# Patient Record
Sex: FEMALE | Race: WHITE | NOT HISPANIC OR LATINO | URBAN - NONMETROPOLITAN AREA
[De-identification: names, ages, dates, MRNs, and addresses within clinical notes are randomized per-mention and may not be internally consistent; named-entity substitution may affect disease eponyms.]

---

## 2020-01-01 ENCOUNTER — APPOINTMENT (OUTPATIENT)
Dept: GENERAL RADIOLOGY | Facility: HOSPITAL | Age: 55
End: 2020-01-01

## 2020-01-01 ENCOUNTER — APPOINTMENT (OUTPATIENT)
Dept: CARDIOLOGY | Facility: HOSPITAL | Age: 55
End: 2020-01-01

## 2020-01-01 ENCOUNTER — HOSPITAL ENCOUNTER (INPATIENT)
Facility: HOSPITAL | Age: 55
LOS: 9 days | End: 2020-12-11
Attending: EMERGENCY MEDICINE | Admitting: INTERNAL MEDICINE

## 2020-01-01 ENCOUNTER — APPOINTMENT (OUTPATIENT)
Dept: NUCLEAR MEDICINE | Facility: HOSPITAL | Age: 55
End: 2020-01-01

## 2020-01-01 VITALS
DIASTOLIC BLOOD PRESSURE: 38 MMHG | BODY MASS INDEX: 36.96 KG/M2 | HEIGHT: 67 IN | WEIGHT: 235.5 LBS | SYSTOLIC BLOOD PRESSURE: 62 MMHG | TEMPERATURE: 99.7 F | RESPIRATION RATE: 28 BRPM

## 2020-01-01 DIAGNOSIS — U07.1 PNEUMONIA DUE TO COVID-19 VIRUS: ICD-10-CM

## 2020-01-01 DIAGNOSIS — J12.82 PNEUMONIA DUE TO COVID-19 VIRUS: ICD-10-CM

## 2020-01-01 DIAGNOSIS — U07.1 ACUTE RESPIRATORY DISTRESS SYNDROME (ARDS) DUE TO COVID-19 VIRUS (HCC): ICD-10-CM

## 2020-01-01 DIAGNOSIS — J80 ACUTE RESPIRATORY DISTRESS SYNDROME (ARDS) DUE TO COVID-19 VIRUS (HCC): ICD-10-CM

## 2020-01-01 DIAGNOSIS — U07.1 COVID-19: Primary | ICD-10-CM

## 2020-01-01 DIAGNOSIS — J96.01 ACUTE RESPIRATORY FAILURE WITH HYPOXIA (HCC): ICD-10-CM

## 2020-01-01 LAB
ABO GROUP BLD: NORMAL
ALBUMIN SERPL-MCNC: 3.1 G/DL (ref 3.5–5.2)
ALBUMIN SERPL-MCNC: 3.1 G/DL (ref 3.5–5.2)
ALBUMIN SERPL-MCNC: 3.2 G/DL (ref 3.5–5.2)
ALBUMIN SERPL-MCNC: 3.4 G/DL (ref 3.5–5.2)
ALBUMIN SERPL-MCNC: 3.4 G/DL (ref 3.5–5.2)
ALBUMIN SERPL-MCNC: 3.6 G/DL (ref 3.5–5.2)
ALBUMIN SERPL-MCNC: 3.7 G/DL (ref 3.5–5.2)
ALBUMIN SERPL-MCNC: 3.8 G/DL (ref 3.5–5.2)
ALBUMIN SERPL-MCNC: 3.9 G/DL (ref 3.5–5.2)
ALBUMIN SERPL-MCNC: 4 G/DL (ref 3.5–5.2)
ALBUMIN SERPL-MCNC: 4 G/DL (ref 3.5–5.2)
ALBUMIN/GLOB SERPL: 1 G/DL
ALBUMIN/GLOB SERPL: 1 G/DL
ALBUMIN/GLOB SERPL: 1.1 G/DL
ALBUMIN/GLOB SERPL: 1.2 G/DL
ALBUMIN/GLOB SERPL: 1.2 G/DL
ALBUMIN/GLOB SERPL: 1.3 G/DL
ALBUMIN/GLOB SERPL: 1.4 G/DL
ALP SERPL-CCNC: 100 U/L (ref 39–117)
ALP SERPL-CCNC: 109 U/L (ref 39–117)
ALP SERPL-CCNC: 110 U/L (ref 39–117)
ALP SERPL-CCNC: 110 U/L (ref 39–117)
ALP SERPL-CCNC: 112 U/L (ref 39–117)
ALP SERPL-CCNC: 113 U/L (ref 39–117)
ALP SERPL-CCNC: 118 U/L (ref 39–117)
ALP SERPL-CCNC: 88 U/L (ref 39–117)
ALP SERPL-CCNC: 94 U/L (ref 39–117)
ALP SERPL-CCNC: 95 U/L (ref 39–117)
ALP SERPL-CCNC: 96 U/L (ref 39–117)
ALT SERPL W P-5'-P-CCNC: 12 U/L (ref 1–33)
ALT SERPL W P-5'-P-CCNC: 14 U/L (ref 1–33)
ALT SERPL W P-5'-P-CCNC: 18 U/L (ref 1–33)
ALT SERPL W P-5'-P-CCNC: 18 U/L (ref 1–33)
AMPHET+METHAMPHET UR QL: NEGATIVE
AMPHETAMINES UR QL: NEGATIVE
ANION GAP SERPL CALCULATED.3IONS-SCNC: 10 MMOL/L (ref 5–15)
ANION GAP SERPL CALCULATED.3IONS-SCNC: 11 MMOL/L (ref 5–15)
ANION GAP SERPL CALCULATED.3IONS-SCNC: 12 MMOL/L (ref 5–15)
ANION GAP SERPL CALCULATED.3IONS-SCNC: 14 MMOL/L (ref 5–15)
ANION GAP SERPL CALCULATED.3IONS-SCNC: 9 MMOL/L (ref 5–15)
ARTERIAL PATENCY WRIST A: ABNORMAL
ARTERIAL PATENCY WRIST A: POSITIVE
AST SERPL-CCNC: 19 U/L (ref 1–32)
AST SERPL-CCNC: 19 U/L (ref 1–32)
AST SERPL-CCNC: 21 U/L (ref 1–32)
AST SERPL-CCNC: 21 U/L (ref 1–32)
AST SERPL-CCNC: 22 U/L (ref 1–32)
AST SERPL-CCNC: 24 U/L (ref 1–32)
AST SERPL-CCNC: 29 U/L (ref 1–32)
AST SERPL-CCNC: 31 U/L (ref 1–32)
AST SERPL-CCNC: 35 U/L (ref 1–32)
AST SERPL-CCNC: 36 U/L (ref 1–32)
AST SERPL-CCNC: 37 U/L (ref 1–32)
ATMOSPHERIC PRESS: 749 MMHG
ATMOSPHERIC PRESS: 750 MMHG
ATMOSPHERIC PRESS: 750 MMHG
ATMOSPHERIC PRESS: 751 MMHG
ATMOSPHERIC PRESS: 754 MMHG
ATMOSPHERIC PRESS: 757 MMHG
ATMOSPHERIC PRESS: 759 MMHG
B PARAPERT DNA SPEC QL NAA+PROBE: NOT DETECTED
B PERT DNA SPEC QL NAA+PROBE: NOT DETECTED
BACTERIA SPEC AEROBE CULT: ABNORMAL
BACTERIA SPEC AEROBE CULT: NORMAL
BACTERIA SPEC AEROBE CULT: NORMAL
BACTERIA UR QL AUTO: ABNORMAL /HPF
BARBITURATES UR QL SCN: NEGATIVE
BASE EXCESS BLDA CALC-SCNC: -7.5 MMOL/L (ref 0–2)
BASE EXCESS BLDA CALC-SCNC: 0 MMOL/L (ref 0–2)
BASE EXCESS BLDA CALC-SCNC: 1.4 MMOL/L (ref 0–2)
BASE EXCESS BLDA CALC-SCNC: 2.7 MMOL/L (ref 0–2)
BASE EXCESS BLDA CALC-SCNC: 2.9 MMOL/L (ref 0–2)
BASE EXCESS BLDA CALC-SCNC: 3.2 MMOL/L (ref 0–2)
BASE EXCESS BLDA CALC-SCNC: 3.3 MMOL/L (ref 0–2)
BASE EXCESS BLDA CALC-SCNC: 3.5 MMOL/L (ref 0–2)
BASE EXCESS BLDA CALC-SCNC: 6 MMOL/L (ref 0–2)
BASOPHILS # BLD AUTO: 0 10*3/MM3 (ref 0–0.2)
BASOPHILS # BLD AUTO: 0.01 10*3/MM3 (ref 0–0.2)
BASOPHILS # BLD AUTO: 0.02 10*3/MM3 (ref 0–0.2)
BASOPHILS # BLD AUTO: 0.03 10*3/MM3 (ref 0–0.2)
BASOPHILS NFR BLD AUTO: 0 % (ref 0–1.5)
BASOPHILS NFR BLD AUTO: 0.2 % (ref 0–1.5)
BASOPHILS NFR BLD AUTO: 0.2 % (ref 0–1.5)
BASOPHILS NFR BLD AUTO: 0.4 % (ref 0–1.5)
BDY SITE: ABNORMAL
BENZODIAZ UR QL SCN: NEGATIVE
BH BB BLOOD EXPIRATION DATE: NORMAL
BH BB BLOOD TYPE BARCODE: 600
BH BB DISPENSE STATUS: NORMAL
BH BB PRODUCT CODE: NORMAL
BH BB UNIT NUMBER: NORMAL
BH CV ECHO MEAS - AO MAX PG (FULL): 2.2 MMHG
BH CV ECHO MEAS - AO MAX PG: 8.4 MMHG
BH CV ECHO MEAS - AO MEAN PG (FULL): 2 MMHG
BH CV ECHO MEAS - AO MEAN PG: 5 MMHG
BH CV ECHO MEAS - AO ROOT AREA (BSA CORRECTED): 1.5
BH CV ECHO MEAS - AO ROOT AREA: 8.6 CM^2
BH CV ECHO MEAS - AO ROOT DIAM: 3.3 CM
BH CV ECHO MEAS - AO V2 MAX: 145 CM/SEC
BH CV ECHO MEAS - AO V2 MEAN: 97.9 CM/SEC
BH CV ECHO MEAS - AO V2 VTI: 21.9 CM
BH CV ECHO MEAS - AVA(I,A): 3.6 CM^2
BH CV ECHO MEAS - AVA(I,D): 3.6 CM^2
BH CV ECHO MEAS - AVA(V,A): 2.7 CM^2
BH CV ECHO MEAS - AVA(V,D): 2.7 CM^2
BH CV ECHO MEAS - BSA(HAYCOCK): 2.3 M^2
BH CV ECHO MEAS - BSA: 2.2 M^2
BH CV ECHO MEAS - BZI_BMI: 37.4 KILOGRAMS/M^2
BH CV ECHO MEAS - BZI_METRIC_HEIGHT: 170.2 CM
BH CV ECHO MEAS - BZI_METRIC_WEIGHT: 108.4 KG
BH CV ECHO MEAS - EDV(CUBED): 102.5 ML
BH CV ECHO MEAS - EDV(MOD-SP4): 88.7 ML
BH CV ECHO MEAS - EDV(TEICH): 101.3 ML
BH CV ECHO MEAS - EF(CUBED): 76 %
BH CV ECHO MEAS - EF(MOD-SP4): 57 %
BH CV ECHO MEAS - EF(TEICH): 67.9 %
BH CV ECHO MEAS - ESV(CUBED): 24.6 ML
BH CV ECHO MEAS - ESV(MOD-SP4): 38.1 ML
BH CV ECHO MEAS - ESV(TEICH): 32.5 ML
BH CV ECHO MEAS - FS: 37.8 %
BH CV ECHO MEAS - IVS/LVPW: 0.93
BH CV ECHO MEAS - IVSD: 0.94 CM
BH CV ECHO MEAS - LA DIMENSION: 4.9 CM
BH CV ECHO MEAS - LA/AO: 1.5
BH CV ECHO MEAS - LAT PEAK E' VEL: 9.4 CM/SEC
BH CV ECHO MEAS - LV DIASTOLIC VOL/BSA (35-75): 40.7 ML/M^2
BH CV ECHO MEAS - LV MASS(C)D: 157.5 GRAMS
BH CV ECHO MEAS - LV MASS(C)DI: 72.2 GRAMS/M^2
BH CV ECHO MEAS - LV MAX PG: 6.3 MMHG
BH CV ECHO MEAS - LV MEAN PG: 3 MMHG
BH CV ECHO MEAS - LV SYSTOLIC VOL/BSA (12-30): 17.5 ML/M^2
BH CV ECHO MEAS - LV V1 MAX: 125 CM/SEC
BH CV ECHO MEAS - LV V1 MEAN: 77.3 CM/SEC
BH CV ECHO MEAS - LV V1 VTI: 25.4 CM
BH CV ECHO MEAS - LVIDD: 4.7 CM
BH CV ECHO MEAS - LVIDS: 2.9 CM
BH CV ECHO MEAS - LVLD AP4: 7.6 CM
BH CV ECHO MEAS - LVLS AP4: 6.8 CM
BH CV ECHO MEAS - LVOT AREA (M): 3.1 CM^2
BH CV ECHO MEAS - LVOT AREA: 3.1 CM^2
BH CV ECHO MEAS - LVOT DIAM: 2 CM
BH CV ECHO MEAS - LVPWD: 1 CM
BH CV ECHO MEAS - MED PEAK E' VEL: 5.2 CM/SEC
BH CV ECHO MEAS - MV A MAX VEL: 70.3 CM/SEC
BH CV ECHO MEAS - MV DEC SLOPE: 904.5 CM/SEC^2
BH CV ECHO MEAS - MV DEC TIME: 0.12 SEC
BH CV ECHO MEAS - MV E MAX VEL: 71.3 CM/SEC
BH CV ECHO MEAS - MV E/A: 1
BH CV ECHO MEAS - MV P1/2T MAX VEL: 126 CM/SEC
BH CV ECHO MEAS - MV P1/2T: 40.8 MSEC
BH CV ECHO MEAS - MVA P1/2T LCG: 1.7 CM^2
BH CV ECHO MEAS - MVA(P1/2T): 5.4 CM^2
BH CV ECHO MEAS - PA MAX PG: 5.9 MMHG
BH CV ECHO MEAS - PA V2 MAX: 121 CM/SEC
BH CV ECHO MEAS - SI(AO): 85.9 ML/M^2
BH CV ECHO MEAS - SI(CUBED): 35.7 ML/M^2
BH CV ECHO MEAS - SI(LVOT): 36.6 ML/M^2
BH CV ECHO MEAS - SI(MOD-SP4): 23.2 ML/M^2
BH CV ECHO MEAS - SI(TEICH): 31.6 ML/M^2
BH CV ECHO MEAS - SV(AO): 187.3 ML
BH CV ECHO MEAS - SV(CUBED): 77.9 ML
BH CV ECHO MEAS - SV(LVOT): 79.8 ML
BH CV ECHO MEAS - SV(MOD-SP4): 50.6 ML
BH CV ECHO MEAS - SV(TEICH): 68.9 ML
BH CV ECHO MEAS - TR MAX VEL: 125 CM/SEC
BH CV ECHO MEASUREMENTS AVERAGE E/E' RATIO: 9.77
BILIRUB CONJ SERPL-MCNC: 0.2 MG/DL (ref 0–0.3)
BILIRUB CONJ SERPL-MCNC: 0.2 MG/DL (ref 0–0.3)
BILIRUB INDIRECT SERPL-MCNC: 0.3 MG/DL
BILIRUB SERPL-MCNC: 0.4 MG/DL (ref 0–1.2)
BILIRUB SERPL-MCNC: 0.4 MG/DL (ref 0–1.2)
BILIRUB SERPL-MCNC: 0.5 MG/DL (ref 0–1.2)
BILIRUB SERPL-MCNC: 0.6 MG/DL (ref 0–1.2)
BILIRUB SERPL-MCNC: 0.6 MG/DL (ref 0–1.2)
BILIRUB UR QL STRIP: NEGATIVE
BLD GP AB SCN SERPL QL: NEGATIVE
BODY TEMPERATURE: 37 C
BUN SERPL-MCNC: 27 MG/DL (ref 6–20)
BUN SERPL-MCNC: 28 MG/DL (ref 6–20)
BUN SERPL-MCNC: 31 MG/DL (ref 6–20)
BUN SERPL-MCNC: 34 MG/DL (ref 6–20)
BUN SERPL-MCNC: 35 MG/DL (ref 6–20)
BUN SERPL-MCNC: 40 MG/DL (ref 6–20)
BUN SERPL-MCNC: 42 MG/DL (ref 6–20)
BUN SERPL-MCNC: 42 MG/DL (ref 6–20)
BUN SERPL-MCNC: 47 MG/DL (ref 6–20)
BUN SERPL-MCNC: 51 MG/DL (ref 6–20)
BUN/CREAT SERPL: 33.6 (ref 7–25)
BUN/CREAT SERPL: 40.9 (ref 7–25)
BUN/CREAT SERPL: 51.9 (ref 7–25)
BUN/CREAT SERPL: 52.8 (ref 7–25)
BUN/CREAT SERPL: 54.5 (ref 7–25)
BUN/CREAT SERPL: 59.6 (ref 7–25)
BUN/CREAT SERPL: 60.6 (ref 7–25)
BUN/CREAT SERPL: 62 (ref 7–25)
BUN/CREAT SERPL: 68.6 (ref 7–25)
BUN/CREAT SERPL: 72.4 (ref 7–25)
BUPRENORPHINE SERPL-MCNC: NEGATIVE NG/ML
C PNEUM DNA NPH QL NAA+NON-PROBE: NOT DETECTED
CALCIUM SPEC-SCNC: 9.1 MG/DL (ref 8.6–10.5)
CALCIUM SPEC-SCNC: 9.1 MG/DL (ref 8.6–10.5)
CALCIUM SPEC-SCNC: 9.2 MG/DL (ref 8.6–10.5)
CALCIUM SPEC-SCNC: 9.2 MG/DL (ref 8.6–10.5)
CALCIUM SPEC-SCNC: 9.3 MG/DL (ref 8.6–10.5)
CALCIUM SPEC-SCNC: 9.4 MG/DL (ref 8.6–10.5)
CALCIUM SPEC-SCNC: 9.7 MG/DL (ref 8.6–10.5)
CANNABINOIDS SERPL QL: NEGATIVE
CHLORIDE SERPL-SCNC: 100 MMOL/L (ref 98–107)
CHLORIDE SERPL-SCNC: 101 MMOL/L (ref 98–107)
CHLORIDE SERPL-SCNC: 101 MMOL/L (ref 98–107)
CHLORIDE SERPL-SCNC: 102 MMOL/L (ref 98–107)
CHLORIDE SERPL-SCNC: 103 MMOL/L (ref 98–107)
CHLORIDE SERPL-SCNC: 104 MMOL/L (ref 98–107)
CHLORIDE SERPL-SCNC: 104 MMOL/L (ref 98–107)
CHLORIDE SERPL-SCNC: 105 MMOL/L (ref 98–107)
CHLORIDE SERPL-SCNC: 107 MMOL/L (ref 98–107)
CHLORIDE SERPL-SCNC: 97 MMOL/L (ref 98–107)
CK SERPL-CCNC: 74 U/L (ref 20–180)
CLARITY UR: ABNORMAL
CO2 SERPL-SCNC: 25 MMOL/L (ref 22–29)
CO2 SERPL-SCNC: 26 MMOL/L (ref 22–29)
CO2 SERPL-SCNC: 27 MMOL/L (ref 22–29)
CO2 SERPL-SCNC: 28 MMOL/L (ref 22–29)
CO2 SERPL-SCNC: 28 MMOL/L (ref 22–29)
CO2 SERPL-SCNC: 29 MMOL/L (ref 22–29)
COCAINE UR QL: NEGATIVE
COLOR UR: YELLOW
CREAT SERPL-MCNC: 0.5 MG/DL (ref 0.57–1)
CREAT SERPL-MCNC: 0.51 MG/DL (ref 0.57–1)
CREAT SERPL-MCNC: 0.52 MG/DL (ref 0.57–1)
CREAT SERPL-MCNC: 0.53 MG/DL (ref 0.57–1)
CREAT SERPL-MCNC: 0.57 MG/DL (ref 0.57–1)
CREAT SERPL-MCNC: 0.58 MG/DL (ref 0.57–1)
CREAT SERPL-MCNC: 0.66 MG/DL (ref 0.57–1)
CREAT SERPL-MCNC: 0.77 MG/DL (ref 0.57–1)
CREAT SERPL-MCNC: 1.15 MG/DL (ref 0.57–1)
CREAT SERPL-MCNC: 1.17 MG/DL (ref 0.57–1)
CREAT SERPL-MCNC: 1.52 MG/DL (ref 0.57–1)
CRP SERPL-MCNC: 11.27 MG/DL (ref 0–0.5)
CRP SERPL-MCNC: 18.57 MG/DL (ref 0–0.5)
CRP SERPL-MCNC: 8.96 MG/DL (ref 0–0.5)
D DIMER PPP FEU-MCNC: 0.52 MG/L (FEU) (ref 0–0.5)
D DIMER PPP FEU-MCNC: 0.69 MG/L (FEU) (ref 0–0.5)
D DIMER PPP FEU-MCNC: 19.3 MG/L (FEU) (ref 0–0.5)
D-LACTATE SERPL-SCNC: 1.9 MMOL/L (ref 0.5–2)
DEPRECATED RDW RBC AUTO: 40.1 FL (ref 37–54)
DEPRECATED RDW RBC AUTO: 40.3 FL (ref 37–54)
DEPRECATED RDW RBC AUTO: 41.2 FL (ref 37–54)
DEPRECATED RDW RBC AUTO: 41.3 FL (ref 37–54)
DEPRECATED RDW RBC AUTO: 42.3 FL (ref 37–54)
EOSINOPHIL # BLD AUTO: 0 10*3/MM3 (ref 0–0.4)
EOSINOPHIL # BLD AUTO: 0.05 10*3/MM3 (ref 0–0.4)
EOSINOPHIL NFR BLD AUTO: 0 % (ref 0.3–6.2)
EOSINOPHIL NFR BLD AUTO: 0.5 % (ref 0.3–6.2)
EPAP: 10
EPAP: 8
ERYTHROCYTE [DISTWIDTH] IN BLOOD BY AUTOMATED COUNT: 13.2 % (ref 12.3–15.4)
ERYTHROCYTE [DISTWIDTH] IN BLOOD BY AUTOMATED COUNT: 13.4 % (ref 12.3–15.4)
ERYTHROCYTE [DISTWIDTH] IN BLOOD BY AUTOMATED COUNT: 13.6 % (ref 12.3–15.4)
FERRITIN SERPL-MCNC: 1034 NG/ML (ref 13–150)
FERRITIN SERPL-MCNC: 689.6 NG/ML (ref 13–150)
FERRITIN SERPL-MCNC: 738.9 NG/ML (ref 13–150)
FIBRINOGEN PPP-MCNC: 595 MG/DL (ref 240–460)
FLUAV H1 2009 PAND RNA NPH QL NAA+PROBE: NOT DETECTED
FLUAV H1 HA GENE NPH QL NAA+PROBE: NOT DETECTED
FLUAV H3 RNA NPH QL NAA+PROBE: NOT DETECTED
FLUAV SUBTYP SPEC NAA+PROBE: NOT DETECTED
FLUBV RNA ISLT QL NAA+PROBE: NOT DETECTED
GAS FLOW AIRWAY: 40 LPM
GAS FLOW AIRWAY: 5 LPM
GFR SERPL CREATININE-BSD FRML MDRD: 108 ML/MIN/1.73
GFR SERPL CREATININE-BSD FRML MDRD: 110 ML/MIN/1.73
GFR SERPL CREATININE-BSD FRML MDRD: 119 ML/MIN/1.73
GFR SERPL CREATININE-BSD FRML MDRD: 122 ML/MIN/1.73
GFR SERPL CREATININE-BSD FRML MDRD: 125 ML/MIN/1.73
GFR SERPL CREATININE-BSD FRML MDRD: 128 ML/MIN/1.73
GFR SERPL CREATININE-BSD FRML MDRD: 35 ML/MIN/1.73
GFR SERPL CREATININE-BSD FRML MDRD: 48 ML/MIN/1.73
GFR SERPL CREATININE-BSD FRML MDRD: 49 ML/MIN/1.73
GFR SERPL CREATININE-BSD FRML MDRD: 78 ML/MIN/1.73
GFR SERPL CREATININE-BSD FRML MDRD: 93 ML/MIN/1.73
GLOBULIN UR ELPH-MCNC: 2.7 GM/DL
GLOBULIN UR ELPH-MCNC: 2.9 GM/DL
GLOBULIN UR ELPH-MCNC: 3 GM/DL
GLOBULIN UR ELPH-MCNC: 3.1 GM/DL
GLOBULIN UR ELPH-MCNC: 3.1 GM/DL
GLUCOSE SERPL-MCNC: 104 MG/DL (ref 65–99)
GLUCOSE SERPL-MCNC: 108 MG/DL (ref 65–99)
GLUCOSE SERPL-MCNC: 115 MG/DL (ref 65–99)
GLUCOSE SERPL-MCNC: 116 MG/DL (ref 65–99)
GLUCOSE SERPL-MCNC: 118 MG/DL (ref 65–99)
GLUCOSE SERPL-MCNC: 118 MG/DL (ref 65–99)
GLUCOSE SERPL-MCNC: 121 MG/DL (ref 65–99)
GLUCOSE SERPL-MCNC: 132 MG/DL (ref 65–99)
GLUCOSE SERPL-MCNC: 144 MG/DL (ref 65–99)
GLUCOSE SERPL-MCNC: 152 MG/DL (ref 65–99)
GLUCOSE UR STRIP-MCNC: NEGATIVE MG/DL
HADV DNA SPEC NAA+PROBE: NOT DETECTED
HBA1C MFR BLD: 5.6 % (ref 4.8–5.6)
HCO3 BLDA-SCNC: 21.9 MMOL/L (ref 20–26)
HCO3 BLDA-SCNC: 25.6 MMOL/L (ref 20–26)
HCO3 BLDA-SCNC: 26.4 MMOL/L (ref 20–26)
HCO3 BLDA-SCNC: 27 MMOL/L (ref 20–26)
HCO3 BLDA-SCNC: 27.2 MMOL/L (ref 20–26)
HCO3 BLDA-SCNC: 27.3 MMOL/L (ref 20–26)
HCO3 BLDA-SCNC: 28.3 MMOL/L (ref 20–26)
HCO3 BLDA-SCNC: 28.3 MMOL/L (ref 20–26)
HCO3 BLDA-SCNC: 30.2 MMOL/L (ref 20–26)
HCOV 229E RNA SPEC QL NAA+PROBE: NOT DETECTED
HCOV HKU1 RNA SPEC QL NAA+PROBE: NOT DETECTED
HCOV NL63 RNA SPEC QL NAA+PROBE: NOT DETECTED
HCOV OC43 RNA SPEC QL NAA+PROBE: NOT DETECTED
HCT VFR BLD AUTO: 36.9 % (ref 34–46.6)
HCT VFR BLD AUTO: 37.1 % (ref 34–46.6)
HCT VFR BLD AUTO: 37.8 % (ref 34–46.6)
HCT VFR BLD AUTO: 40.9 % (ref 34–46.6)
HCT VFR BLD AUTO: 41.4 % (ref 34–46.6)
HGB BLD-MCNC: 11.7 G/DL (ref 12–15.9)
HGB BLD-MCNC: 11.8 G/DL (ref 12–15.9)
HGB BLD-MCNC: 12.1 G/DL (ref 12–15.9)
HGB BLD-MCNC: 13.1 G/DL (ref 12–15.9)
HGB BLD-MCNC: 13.5 G/DL (ref 12–15.9)
HGB UR QL STRIP.AUTO: NEGATIVE
HMPV RNA NPH QL NAA+NON-PROBE: NOT DETECTED
HPIV1 RNA SPEC QL NAA+PROBE: NOT DETECTED
HPIV2 RNA SPEC QL NAA+PROBE: NOT DETECTED
HPIV3 RNA NPH QL NAA+PROBE: NOT DETECTED
HPIV4 P GENE NPH QL NAA+PROBE: NOT DETECTED
HYALINE CASTS UR QL AUTO: ABNORMAL /LPF
IL6 SERPL-MCNC: 8.3 PG/ML (ref 0–13)
IMM GRANULOCYTES # BLD AUTO: 0.04 10*3/MM3 (ref 0–0.05)
IMM GRANULOCYTES # BLD AUTO: 0.07 10*3/MM3 (ref 0–0.05)
IMM GRANULOCYTES # BLD AUTO: 0.09 10*3/MM3 (ref 0–0.05)
IMM GRANULOCYTES # BLD AUTO: 0.13 10*3/MM3 (ref 0–0.05)
IMM GRANULOCYTES NFR BLD AUTO: 1.1 % (ref 0–0.5)
IMM GRANULOCYTES NFR BLD AUTO: 1.1 % (ref 0–0.5)
IMM GRANULOCYTES NFR BLD AUTO: 1.2 % (ref 0–0.5)
IMM GRANULOCYTES NFR BLD AUTO: 1.3 % (ref 0–0.5)
INHALED O2 CONCENTRATION: 100 %
INHALED O2 CONCENTRATION: 95 %
INR PPP: 1.06 (ref 0.91–1.09)
IPAP: 16
IPAP: 20
KETONES UR QL STRIP: ABNORMAL
L PNEUMO1 AG UR QL IA: NEGATIVE
LDH SERPL-CCNC: 628 U/L (ref 135–214)
LDH SERPL-CCNC: 656 U/L (ref 135–214)
LDH SERPL-CCNC: 731 U/L (ref 135–214)
LEFT ATRIUM VOLUME INDEX: 30 ML/M2
LEUKOCYTE ESTERASE UR QL STRIP.AUTO: ABNORMAL
LYMPHOCYTES # BLD AUTO: 0.39 10*3/MM3 (ref 0.7–3.1)
LYMPHOCYTES # BLD AUTO: 0.46 10*3/MM3 (ref 0.7–3.1)
LYMPHOCYTES # BLD AUTO: 0.53 10*3/MM3 (ref 0.7–3.1)
LYMPHOCYTES # BLD AUTO: 0.77 10*3/MM3 (ref 0.7–3.1)
LYMPHOCYTES # BLD MANUAL: 0.97 10*3/MM3 (ref 0.7–3.1)
LYMPHOCYTES NFR BLD AUTO: 10.6 % (ref 19.6–45.3)
LYMPHOCYTES NFR BLD AUTO: 13.2 % (ref 19.6–45.3)
LYMPHOCYTES NFR BLD AUTO: 5.1 % (ref 19.6–45.3)
LYMPHOCYTES NFR BLD AUTO: 6.1 % (ref 19.6–45.3)
LYMPHOCYTES NFR BLD MANUAL: 3.1 % (ref 19.6–45.3)
LYMPHOCYTES NFR BLD MANUAL: 4.1 % (ref 5–12)
Lab: ABNORMAL
M PNEUMO IGG SER IA-ACNC: NOT DETECTED
MAGNESIUM SERPL-MCNC: 1.9 MG/DL (ref 1.6–2.6)
MAGNESIUM SERPL-MCNC: 2.1 MG/DL (ref 1.6–2.6)
MAGNESIUM SERPL-MCNC: 2.4 MG/DL (ref 1.6–2.6)
MAXIMAL PREDICTED HEART RATE: 164 BPM
MCH RBC QN AUTO: 26.4 PG (ref 26.6–33)
MCH RBC QN AUTO: 26.6 PG (ref 26.6–33)
MCH RBC QN AUTO: 26.7 PG (ref 26.6–33)
MCH RBC QN AUTO: 27.1 PG (ref 26.6–33)
MCH RBC QN AUTO: 27.7 PG (ref 26.6–33)
MCHC RBC AUTO-ENTMCNC: 31.5 G/DL (ref 31.5–35.7)
MCHC RBC AUTO-ENTMCNC: 32 G/DL (ref 31.5–35.7)
MCHC RBC AUTO-ENTMCNC: 32.6 G/DL (ref 31.5–35.7)
MCV RBC AUTO: 83 FL (ref 79–97)
MCV RBC AUTO: 83.1 FL (ref 79–97)
MCV RBC AUTO: 83.3 FL (ref 79–97)
MCV RBC AUTO: 83.7 FL (ref 79–97)
MCV RBC AUTO: 86.6 FL (ref 79–97)
METHADONE UR QL SCN: NEGATIVE
MODALITY: ABNORMAL
MONOCYTES # BLD AUTO: 0.19 10*3/MM3 (ref 0.1–0.9)
MONOCYTES # BLD AUTO: 0.25 10*3/MM3 (ref 0.1–0.9)
MONOCYTES # BLD AUTO: 0.56 10*3/MM3 (ref 0.1–0.9)
MONOCYTES # BLD AUTO: 0.66 10*3/MM3 (ref 0.1–0.9)
MONOCYTES # BLD AUTO: 1.29 10*3/MM3 (ref 0.1–0.9)
MONOCYTES NFR BLD AUTO: 3 % (ref 5–12)
MONOCYTES NFR BLD AUTO: 5.4 % (ref 5–12)
MONOCYTES NFR BLD AUTO: 7.2 % (ref 5–12)
MONOCYTES NFR BLD AUTO: 9.1 % (ref 5–12)
MRSA DNA SPEC QL NAA+PROBE: ABNORMAL
NEUTROPHILS # BLD AUTO: 29.17 10*3/MM3 (ref 1.7–7)
NEUTROPHILS NFR BLD AUTO: 2.73 10*3/MM3 (ref 1.7–7)
NEUTROPHILS NFR BLD AUTO: 5.7 10*3/MM3 (ref 1.7–7)
NEUTROPHILS NFR BLD AUTO: 5.74 10*3/MM3 (ref 1.7–7)
NEUTROPHILS NFR BLD AUTO: 78.5 % (ref 42.7–76)
NEUTROPHILS NFR BLD AUTO: 78.7 % (ref 42.7–76)
NEUTROPHILS NFR BLD AUTO: 87.5 % (ref 42.7–76)
NEUTROPHILS NFR BLD AUTO: 89.6 % (ref 42.7–76)
NEUTROPHILS NFR BLD AUTO: 9.09 10*3/MM3 (ref 1.7–7)
NEUTROPHILS NFR BLD MANUAL: 92.9 % (ref 42.7–76)
NITRITE UR QL STRIP: NEGATIVE
NOTIFIED BY: ABNORMAL
NOTIFIED WHO: ABNORMAL
NRBC BLD AUTO-RTO: 0 /100 WBC (ref 0–0.2)
NT-PROBNP SERPL-MCNC: 288.6 PG/ML (ref 0–900)
NT-PROBNP SERPL-MCNC: 688.2 PG/ML (ref 0–900)
OPIATES UR QL: POSITIVE
OXYCODONE UR QL SCN: POSITIVE
PCO2 BLDA: 38.3 MM HG (ref 35–45)
PCO2 BLDA: 38.5 MM HG (ref 35–45)
PCO2 BLDA: 38.7 MM HG (ref 35–45)
PCO2 BLDA: 40.9 MM HG (ref 35–45)
PCO2 BLDA: 41.1 MM HG (ref 35–45)
PCO2 BLDA: 42.9 MM HG (ref 35–45)
PCO2 BLDA: 43.8 MM HG (ref 35–45)
PCO2 BLDA: 48.9 MM HG (ref 35–45)
PCO2 BLDA: 62.3 MM HG (ref 35–45)
PCO2 TEMP ADJ BLD: 38.3 MM HG (ref 35–45)
PCO2 TEMP ADJ BLD: 38.5 MM HG (ref 35–45)
PCO2 TEMP ADJ BLD: 38.7 MM HG (ref 35–45)
PCO2 TEMP ADJ BLD: 40.9 MM HG (ref 35–45)
PCO2 TEMP ADJ BLD: 41.1 MM HG (ref 35–45)
PCO2 TEMP ADJ BLD: 42.9 MM HG (ref 35–45)
PCO2 TEMP ADJ BLD: 43.8 MM HG (ref 35–45)
PCO2 TEMP ADJ BLD: 48.9 MM HG (ref 35–45)
PCO2 TEMP ADJ BLD: 62.3 MM HG (ref 35–45)
PCP UR QL SCN: NEGATIVE
PEEP RESPIRATORY: 10 CM[H2O]
PEEP RESPIRATORY: 8 CM[H2O]
PH BLDA: 7.15 PH UNITS (ref 7.35–7.45)
PH BLDA: 7.34 PH UNITS (ref 7.35–7.45)
PH BLDA: 7.42 PH UNITS (ref 7.35–7.45)
PH BLDA: 7.43 PH UNITS (ref 7.35–7.45)
PH BLDA: 7.45 PH UNITS (ref 7.35–7.45)
PH BLDA: 7.46 PH UNITS (ref 7.35–7.45)
PH BLDA: 7.48 PH UNITS (ref 7.35–7.45)
PH UR STRIP.AUTO: 5.5 [PH] (ref 5–8)
PH, TEMP CORRECTED: 7.15 PH UNITS (ref 7.35–7.45)
PH, TEMP CORRECTED: 7.34 PH UNITS (ref 7.35–7.45)
PH, TEMP CORRECTED: 7.42 PH UNITS (ref 7.35–7.45)
PH, TEMP CORRECTED: 7.43 PH UNITS (ref 7.35–7.45)
PH, TEMP CORRECTED: 7.45 PH UNITS (ref 7.35–7.45)
PH, TEMP CORRECTED: 7.46 PH UNITS (ref 7.35–7.45)
PH, TEMP CORRECTED: 7.48 PH UNITS (ref 7.35–7.45)
PHOSPHATE SERPL-MCNC: 3.1 MG/DL (ref 2.5–4.5)
PHOSPHATE SERPL-MCNC: 3.2 MG/DL (ref 2.5–4.5)
PLAT MORPH BLD: NORMAL
PLATELET # BLD AUTO: 212 10*3/MM3 (ref 140–450)
PLATELET # BLD AUTO: 258 10*3/MM3 (ref 140–450)
PLATELET # BLD AUTO: 294 10*3/MM3 (ref 140–450)
PLATELET # BLD AUTO: 306 10*3/MM3 (ref 140–450)
PLATELET # BLD AUTO: 419 10*3/MM3 (ref 140–450)
PMV BLD AUTO: 10.2 FL (ref 6–12)
PMV BLD AUTO: 10.4 FL (ref 6–12)
PMV BLD AUTO: 10.5 FL (ref 6–12)
PMV BLD AUTO: 10.8 FL (ref 6–12)
PMV BLD AUTO: 11.2 FL (ref 6–12)
PO2 BLDA: 44 MM HG (ref 83–108)
PO2 BLDA: 47 MM HG (ref 83–108)
PO2 BLDA: 52.4 MM HG (ref 83–108)
PO2 BLDA: 53.7 MM HG (ref 83–108)
PO2 BLDA: 55.4 MM HG (ref 83–108)
PO2 BLDA: 60.3 MM HG (ref 83–108)
PO2 BLDA: 60.5 MM HG (ref 83–108)
PO2 BLDA: 65.5 MM HG (ref 83–108)
PO2 BLDA: 89.4 MM HG (ref 83–108)
PO2 TEMP ADJ BLD: 44 MM HG (ref 83–108)
PO2 TEMP ADJ BLD: 47 MM HG (ref 83–108)
PO2 TEMP ADJ BLD: 52.4 MM HG (ref 83–108)
PO2 TEMP ADJ BLD: 53.7 MM HG (ref 83–108)
PO2 TEMP ADJ BLD: 55.4 MM HG (ref 83–108)
PO2 TEMP ADJ BLD: 60.3 MM HG (ref 83–108)
PO2 TEMP ADJ BLD: 60.5 MM HG (ref 83–108)
PO2 TEMP ADJ BLD: 65.5 MM HG (ref 83–108)
PO2 TEMP ADJ BLD: 89.4 MM HG (ref 83–108)
POTASSIUM SERPL-SCNC: 3.8 MMOL/L (ref 3.5–5.2)
POTASSIUM SERPL-SCNC: 3.9 MMOL/L (ref 3.5–5.2)
POTASSIUM SERPL-SCNC: 4 MMOL/L (ref 3.5–5.2)
POTASSIUM SERPL-SCNC: 4.1 MMOL/L (ref 3.5–5.2)
POTASSIUM SERPL-SCNC: 4.2 MMOL/L (ref 3.5–5.2)
POTASSIUM SERPL-SCNC: 4.2 MMOL/L (ref 3.5–5.2)
POTASSIUM SERPL-SCNC: 4.4 MMOL/L (ref 3.5–5.2)
POTASSIUM SERPL-SCNC: 4.6 MMOL/L (ref 3.5–5.2)
POTASSIUM SERPL-SCNC: 4.6 MMOL/L (ref 3.5–5.2)
POTASSIUM SERPL-SCNC: 5.4 MMOL/L (ref 3.5–5.2)
PROCALCITONIN SERPL-MCNC: 0.09 NG/ML (ref 0–0.25)
PROCALCITONIN SERPL-MCNC: 0.17 NG/ML (ref 0–0.25)
PROPOXYPH UR QL: NEGATIVE
PROT SERPL-MCNC: 5.9 G/DL (ref 6–8.5)
PROT SERPL-MCNC: 6.1 G/DL (ref 6–8.5)
PROT SERPL-MCNC: 6.1 G/DL (ref 6–8.5)
PROT SERPL-MCNC: 6.3 G/DL (ref 6–8.5)
PROT SERPL-MCNC: 6.3 G/DL (ref 6–8.5)
PROT SERPL-MCNC: 6.4 G/DL (ref 6–8.5)
PROT SERPL-MCNC: 6.5 G/DL (ref 6–8.5)
PROT SERPL-MCNC: 6.8 G/DL (ref 6–8.5)
PROT SERPL-MCNC: 6.9 G/DL (ref 6–8.5)
PROT SERPL-MCNC: 7.1 G/DL (ref 6–8.5)
PROT SERPL-MCNC: 7.3 G/DL (ref 6–8.5)
PROT UR QL STRIP: ABNORMAL
PROTHROMBIN TIME: 13.4 SECONDS (ref 11.9–14.6)
QT INTERVAL: 378 MS
QTC INTERVAL: 490 MS
RBC # BLD AUTO: 4.26 10*6/MM3 (ref 3.77–5.28)
RBC # BLD AUTO: 4.43 10*6/MM3 (ref 3.77–5.28)
RBC # BLD AUTO: 4.55 10*6/MM3 (ref 3.77–5.28)
RBC # BLD AUTO: 4.91 10*6/MM3 (ref 3.77–5.28)
RBC # BLD AUTO: 4.99 10*6/MM3 (ref 3.77–5.28)
RBC # UR: ABNORMAL /HPF
RBC MORPH BLD: NORMAL
REF LAB TEST METHOD: ABNORMAL
RH BLD: NEGATIVE
RHINOVIRUS RNA SPEC NAA+PROBE: NOT DETECTED
RSV RNA NPH QL NAA+NON-PROBE: NOT DETECTED
S PNEUM AG SPEC QL LA: NEGATIVE
SAO2 % BLDCOA: 82.7 % (ref 94–99)
SAO2 % BLDCOA: 84.6 % (ref 94–99)
SAO2 % BLDCOA: 87.4 % (ref 94–99)
SAO2 % BLDCOA: 89.1 % (ref 94–99)
SAO2 % BLDCOA: 89.8 % (ref 94–99)
SAO2 % BLDCOA: 92.2 % (ref 94–99)
SAO2 % BLDCOA: 92.9 % (ref 94–99)
SAO2 % BLDCOA: 92.9 % (ref 94–99)
SAO2 % BLDCOA: 94.9 % (ref 94–99)
SARS-COV-2 RDRP RESP QL NAA+PROBE: DETECTED
SET MECH RESP RATE: 14
SET MECH RESP RATE: 14
SET MECH RESP RATE: 20
SET MECH RESP RATE: 20
SODIUM SERPL-SCNC: 136 MMOL/L (ref 136–145)
SODIUM SERPL-SCNC: 139 MMOL/L (ref 136–145)
SODIUM SERPL-SCNC: 140 MMOL/L (ref 136–145)
SODIUM SERPL-SCNC: 142 MMOL/L (ref 136–145)
SP GR UR STRIP: 1.02 (ref 1–1.03)
SQUAMOUS #/AREA URNS HPF: ABNORMAL /HPF
STRESS TARGET HR: 139 BPM
T&S EXPIRATION DATE: NORMAL
TRICYCLICS UR QL SCN: NEGATIVE
UNIT  ABO: NORMAL
UNIT  RH: NORMAL
UROBILINOGEN UR QL STRIP: ABNORMAL
VENTILATOR MODE: ABNORMAL
VENTILATOR MODE: AC
VENTILATOR MODE: AC
VT ON VENT VENT: 500 ML
VT ON VENT VENT: 550 ML
WBC # BLD AUTO: 10.38 10*3/MM3 (ref 3.4–10.8)
WBC # BLD AUTO: 3.48 10*3/MM3 (ref 3.4–10.8)
WBC # BLD AUTO: 31.4 10*3/MM3 (ref 3.4–10.8)
WBC # BLD AUTO: 6.36 10*3/MM3 (ref 3.4–10.8)
WBC # BLD AUTO: 7.29 10*3/MM3 (ref 3.4–10.8)
WBC MORPH BLD: NORMAL
WBC UR QL AUTO: ABNORMAL /HPF

## 2020-01-01 PROCEDURE — 63710000001 DEXAMETHASONE PER 0.25 MG: Performed by: INTERNAL MEDICINE

## 2020-01-01 PROCEDURE — 82803 BLOOD GASES ANY COMBINATION: CPT

## 2020-01-01 PROCEDURE — 87899 AGENT NOS ASSAY W/OPTIC: CPT | Performed by: INTERNAL MEDICINE

## 2020-01-01 PROCEDURE — 94799 UNLISTED PULMONARY SVC/PX: CPT

## 2020-01-01 PROCEDURE — 25010000002 FUROSEMIDE PER 20 MG: Performed by: INTERNAL MEDICINE

## 2020-01-01 PROCEDURE — 25010000002 CEFTRIAXONE: Performed by: INTERNAL MEDICINE

## 2020-01-01 PROCEDURE — 99254 IP/OBS CNSLTJ NEW/EST MOD 60: CPT | Performed by: THORACIC SURGERY (CARDIOTHORACIC VASCULAR SURGERY)

## 2020-01-01 PROCEDURE — 25010000002 DEXAMETHASONE SODIUM PHOSPHATE 10 MG/ML SOLUTION: Performed by: EMERGENCY MEDICINE

## 2020-01-01 PROCEDURE — 80053 COMPREHEN METABOLIC PANEL: CPT | Performed by: EMERGENCY MEDICINE

## 2020-01-01 PROCEDURE — 93306 TTE W/DOPPLER COMPLETE: CPT | Performed by: INTERNAL MEDICINE

## 2020-01-01 PROCEDURE — 71045 X-RAY EXAM CHEST 1 VIEW: CPT

## 2020-01-01 PROCEDURE — 87186 SC STD MICRODIL/AGAR DIL: CPT | Performed by: INTERNAL MEDICINE

## 2020-01-01 PROCEDURE — 99232 SBSQ HOSP IP/OBS MODERATE 35: CPT | Performed by: INTERNAL MEDICINE

## 2020-01-01 PROCEDURE — 25010000002 EPINEPHRINE 1 MG/10ML SOLUTION PREFILLED SYRINGE

## 2020-01-01 PROCEDURE — 84100 ASSAY OF PHOSPHORUS: CPT | Performed by: INTERNAL MEDICINE

## 2020-01-01 PROCEDURE — 85025 COMPLETE CBC W/AUTO DIFF WBC: CPT | Performed by: EMERGENCY MEDICINE

## 2020-01-01 PROCEDURE — A9500 TC99M SESTAMIBI: HCPCS | Performed by: INTERNAL MEDICINE

## 2020-01-01 PROCEDURE — 83615 LACTATE (LD) (LDH) ENZYME: CPT | Performed by: EMERGENCY MEDICINE

## 2020-01-01 PROCEDURE — 25010000002 KETOROLAC TROMETHAMINE PER 15 MG: Performed by: INTERNAL MEDICINE

## 2020-01-01 PROCEDURE — 04HY32Z INSERTION OF MONITORING DEVICE INTO LOWER ARTERY, PERCUTANEOUS APPROACH: ICD-10-PCS | Performed by: INTERNAL MEDICINE

## 2020-01-01 PROCEDURE — 80053 COMPREHEN METABOLIC PANEL: CPT | Performed by: INTERNAL MEDICINE

## 2020-01-01 PROCEDURE — 83615 LACTATE (LD) (LDH) ENZYME: CPT | Performed by: INTERNAL MEDICINE

## 2020-01-01 PROCEDURE — 5A1935Z RESPIRATORY VENTILATION, LESS THAN 24 CONSECUTIVE HOURS: ICD-10-PCS | Performed by: INTERNAL MEDICINE

## 2020-01-01 PROCEDURE — A9540 TC99M MAA: HCPCS | Performed by: INTERNAL MEDICINE

## 2020-01-01 PROCEDURE — 36600 WITHDRAWAL OF ARTERIAL BLOOD: CPT

## 2020-01-01 PROCEDURE — 84145 PROCALCITONIN (PCT): CPT | Performed by: INTERNAL MEDICINE

## 2020-01-01 PROCEDURE — 93005 ELECTROCARDIOGRAM TRACING: CPT | Performed by: EMERGENCY MEDICINE

## 2020-01-01 PROCEDURE — 25010000002 MIDAZOLAM HCL (PF) 5 MG/5ML SOLUTION: Performed by: INTERNAL MEDICINE

## 2020-01-01 PROCEDURE — 0100U HC BIOFIRE FILMARRAY RESP PANEL 2: CPT | Performed by: INTERNAL MEDICINE

## 2020-01-01 PROCEDURE — 83880 ASSAY OF NATRIURETIC PEPTIDE: CPT | Performed by: INTERNAL MEDICINE

## 2020-01-01 PROCEDURE — 25010000002 PROPOFOL 10 MG/ML EMULSION: Performed by: INTERNAL MEDICINE

## 2020-01-01 PROCEDURE — 83735 ASSAY OF MAGNESIUM: CPT | Performed by: INTERNAL MEDICINE

## 2020-01-01 PROCEDURE — 82728 ASSAY OF FERRITIN: CPT | Performed by: INTERNAL MEDICINE

## 2020-01-01 PROCEDURE — 0BH17EZ INSERTION OF ENDOTRACHEAL AIRWAY INTO TRACHEA, VIA NATURAL OR ARTIFICIAL OPENING: ICD-10-PCS | Performed by: INTERNAL MEDICINE

## 2020-01-01 PROCEDURE — 99285 EMERGENCY DEPT VISIT HI MDM: CPT

## 2020-01-01 PROCEDURE — 84145 PROCALCITONIN (PCT): CPT | Performed by: EMERGENCY MEDICINE

## 2020-01-01 PROCEDURE — 25010000002 LORAZEPAM PER 2 MG: Performed by: INTERNAL MEDICINE

## 2020-01-01 PROCEDURE — 25010000002 ENOXAPARIN PER 10 MG: Performed by: INTERNAL MEDICINE

## 2020-01-01 PROCEDURE — 85025 COMPLETE CBC W/AUTO DIFF WBC: CPT | Performed by: INTERNAL MEDICINE

## 2020-01-01 PROCEDURE — 25010000002 CEFTRIAXONE PER 250 MG: Performed by: INTERNAL MEDICINE

## 2020-01-01 PROCEDURE — 85379 FIBRIN DEGRADATION QUANT: CPT | Performed by: INTERNAL MEDICINE

## 2020-01-01 PROCEDURE — 25010000002 DEXAMETHASONE SODIUM PHOSPHATE 10 MG/ML SOLUTION: Performed by: INTERNAL MEDICINE

## 2020-01-01 PROCEDURE — 85007 BL SMEAR W/DIFF WBC COUNT: CPT | Performed by: FAMILY MEDICINE

## 2020-01-01 PROCEDURE — 0 TECHNETIUM SESTAMIBI: Performed by: INTERNAL MEDICINE

## 2020-01-01 PROCEDURE — 87635 SARS-COV-2 COVID-19 AMP PRB: CPT | Performed by: EMERGENCY MEDICINE

## 2020-01-01 PROCEDURE — 87086 URINE CULTURE/COLONY COUNT: CPT | Performed by: INTERNAL MEDICINE

## 2020-01-01 PROCEDURE — 86900 BLOOD TYPING SEROLOGIC ABO: CPT | Performed by: EMERGENCY MEDICINE

## 2020-01-01 PROCEDURE — 82550 ASSAY OF CK (CPK): CPT | Performed by: INTERNAL MEDICINE

## 2020-01-01 PROCEDURE — 93306 TTE W/DOPPLER COMPLETE: CPT

## 2020-01-01 PROCEDURE — 25010000002 SUCCINYLCHOLINE PER 20 MG: Performed by: INTERNAL MEDICINE

## 2020-01-01 PROCEDURE — 25010000002 AZITHROMYCIN PER 500 MG: Performed by: INTERNAL MEDICINE

## 2020-01-01 PROCEDURE — 86140 C-REACTIVE PROTEIN: CPT | Performed by: EMERGENCY MEDICINE

## 2020-01-01 PROCEDURE — 25010000002 FENTANYL CITRATE (PF) 100 MCG/2ML SOLUTION 50 ML VIAL: Performed by: INTERNAL MEDICINE

## 2020-01-01 PROCEDURE — 80306 DRUG TEST PRSMV INSTRMNT: CPT | Performed by: INTERNAL MEDICINE

## 2020-01-01 PROCEDURE — 93010 ELECTROCARDIOGRAM REPORT: CPT | Performed by: INTERNAL MEDICINE

## 2020-01-01 PROCEDURE — 83605 ASSAY OF LACTIC ACID: CPT | Performed by: EMERGENCY MEDICINE

## 2020-01-01 PROCEDURE — 99254 IP/OBS CNSLTJ NEW/EST MOD 60: CPT | Performed by: INTERNAL MEDICINE

## 2020-01-01 PROCEDURE — 85379 FIBRIN DEGRADATION QUANT: CPT | Performed by: EMERGENCY MEDICINE

## 2020-01-01 PROCEDURE — 85384 FIBRINOGEN ACTIVITY: CPT | Performed by: INTERNAL MEDICINE

## 2020-01-01 PROCEDURE — XW033E5 INTRODUCTION OF REMDESIVIR ANTI-INFECTIVE INTO PERIPHERAL VEIN, PERCUTANEOUS APPROACH, NEW TECHNOLOGY GROUP 5: ICD-10-PCS | Performed by: INTERNAL MEDICINE

## 2020-01-01 PROCEDURE — 87040 BLOOD CULTURE FOR BACTERIA: CPT | Performed by: EMERGENCY MEDICINE

## 2020-01-01 PROCEDURE — 86850 RBC ANTIBODY SCREEN: CPT | Performed by: EMERGENCY MEDICINE

## 2020-01-01 PROCEDURE — 78580 LUNG PERFUSION IMAGING: CPT

## 2020-01-01 PROCEDURE — XW13325 TRANSFUSION OF CONVALESCENT PLASMA (NONAUTOLOGOUS) INTO PERIPHERAL VEIN, PERCUTANEOUS APPROACH, NEW TECHNOLOGY GROUP 5: ICD-10-PCS | Performed by: INTERNAL MEDICINE

## 2020-01-01 PROCEDURE — 05H633Z INSERTION OF INFUSION DEVICE INTO LEFT SUBCLAVIAN VEIN, PERCUTANEOUS APPROACH: ICD-10-PCS | Performed by: FAMILY MEDICINE

## 2020-01-01 PROCEDURE — 87641 MR-STAPH DNA AMP PROBE: CPT | Performed by: INTERNAL MEDICINE

## 2020-01-01 PROCEDURE — 82728 ASSAY OF FERRITIN: CPT | Performed by: EMERGENCY MEDICINE

## 2020-01-01 PROCEDURE — 94660 CPAP INITIATION&MGMT: CPT

## 2020-01-01 PROCEDURE — 82565 ASSAY OF CREATININE: CPT | Performed by: INTERNAL MEDICINE

## 2020-01-01 PROCEDURE — 94002 VENT MGMT INPAT INIT DAY: CPT

## 2020-01-01 PROCEDURE — 85610 PROTHROMBIN TIME: CPT | Performed by: INTERNAL MEDICINE

## 2020-01-01 PROCEDURE — 86140 C-REACTIVE PROTEIN: CPT | Performed by: INTERNAL MEDICINE

## 2020-01-01 PROCEDURE — 25010000002 ONDANSETRON PER 1 MG: Performed by: INTERNAL MEDICINE

## 2020-01-01 PROCEDURE — 31500 INSERT EMERGENCY AIRWAY: CPT | Performed by: INTERNAL MEDICINE

## 2020-01-01 PROCEDURE — 25010000002 MIDAZOLAM 50 MG/10ML SOLUTION 10 ML VIAL: Performed by: INTERNAL MEDICINE

## 2020-01-01 PROCEDURE — 06HY33Z INSERTION OF INFUSION DEVICE INTO LOWER VEIN, PERCUTANEOUS APPROACH: ICD-10-PCS | Performed by: INTERNAL MEDICINE

## 2020-01-01 PROCEDURE — 86901 BLOOD TYPING SEROLOGIC RH(D): CPT | Performed by: EMERGENCY MEDICINE

## 2020-01-01 PROCEDURE — 81001 URINALYSIS AUTO W/SCOPE: CPT | Performed by: INTERNAL MEDICINE

## 2020-01-01 PROCEDURE — 83036 HEMOGLOBIN GLYCOSYLATED A1C: CPT | Performed by: INTERNAL MEDICINE

## 2020-01-01 PROCEDURE — 80076 HEPATIC FUNCTION PANEL: CPT | Performed by: INTERNAL MEDICINE

## 2020-01-01 PROCEDURE — 25010000002 PHENYLEPHRINE 10 MG/ML SOLUTION 5 ML VIAL: Performed by: FAMILY MEDICINE

## 2020-01-01 PROCEDURE — 86927 PLASMA FRESH FROZEN: CPT

## 2020-01-01 PROCEDURE — 83520 IMMUNOASSAY QUANT NOS NONAB: CPT | Performed by: INTERNAL MEDICINE

## 2020-01-01 PROCEDURE — 25010000002 PROPOFOL 1000 MG/100ML EMULSION

## 2020-01-01 PROCEDURE — 85025 COMPLETE CBC W/AUTO DIFF WBC: CPT | Performed by: FAMILY MEDICINE

## 2020-01-01 PROCEDURE — 82248 BILIRUBIN DIRECT: CPT | Performed by: INTERNAL MEDICINE

## 2020-01-01 PROCEDURE — 32551 INSERTION OF CHEST TUBE: CPT | Performed by: THORACIC SURGERY (CARDIOTHORACIC VASCULAR SURGERY)

## 2020-01-01 PROCEDURE — 94003 VENT MGMT INPAT SUBQ DAY: CPT

## 2020-01-01 PROCEDURE — 0 TECHNETIUM ALBUMIN AGGREGATED: Performed by: INTERNAL MEDICINE

## 2020-01-01 PROCEDURE — 0W9B30Z DRAINAGE OF LEFT PLEURAL CAVITY WITH DRAINAGE DEVICE, PERCUTANEOUS APPROACH: ICD-10-PCS | Performed by: THORACIC SURGERY (CARDIOTHORACIC VASCULAR SURGERY)

## 2020-01-01 RX ORDER — SODIUM CHLORIDE 0.9 % (FLUSH) 0.9 %
10 SYRINGE (ML) INJECTION AS NEEDED
Status: DISCONTINUED | OUTPATIENT
Start: 2020-01-01 | End: 2020-01-01 | Stop reason: HOSPADM

## 2020-01-01 RX ORDER — ACETAMINOPHEN 160 MG/5ML
650 SOLUTION ORAL EVERY 4 HOURS PRN
Status: DISCONTINUED | OUTPATIENT
Start: 2020-01-01 | End: 2020-01-01 | Stop reason: HOSPADM

## 2020-01-01 RX ORDER — FAMOTIDINE 20 MG/1
20 TABLET, FILM COATED ORAL 2 TIMES DAILY
Status: DISCONTINUED | OUTPATIENT
Start: 2020-01-01 | End: 2020-01-01 | Stop reason: HOSPADM

## 2020-01-01 RX ORDER — DEXAMETHASONE SODIUM PHOSPHATE 10 MG/ML
10 INJECTION, SOLUTION INTRAMUSCULAR; INTRAVENOUS ONCE
Status: COMPLETED | OUTPATIENT
Start: 2020-01-01 | End: 2020-01-01

## 2020-01-01 RX ORDER — HYDROCHLOROTHIAZIDE 25 MG/1
25 TABLET ORAL DAILY
COMMUNITY

## 2020-01-01 RX ORDER — ONDANSETRON 2 MG/ML
4 INJECTION INTRAMUSCULAR; INTRAVENOUS EVERY 6 HOURS PRN
Status: DISCONTINUED | OUTPATIENT
Start: 2020-01-01 | End: 2020-01-01 | Stop reason: HOSPADM

## 2020-01-01 RX ORDER — FLUTICASONE PROPIONATE 50 MCG
1 SPRAY, SUSPENSION (ML) NASAL 2 TIMES DAILY
COMMUNITY

## 2020-01-01 RX ORDER — ETOMIDATE 2 MG/ML
INJECTION INTRAVENOUS
Status: COMPLETED | OUTPATIENT
Start: 2020-01-01 | End: 2020-01-01

## 2020-01-01 RX ORDER — LISINOPRIL 40 MG/1
20 TABLET ORAL 2 TIMES DAILY
COMMUNITY

## 2020-01-01 RX ORDER — LANOLIN ALCOHOL/MO/W.PET/CERES
6 CREAM (GRAM) TOPICAL NIGHTLY
Status: DISCONTINUED | OUTPATIENT
Start: 2020-01-01 | End: 2020-01-01 | Stop reason: HOSPADM

## 2020-01-01 RX ORDER — SUCCINYLCHOLINE CHLORIDE 20 MG/ML
INJECTION INTRAMUSCULAR; INTRAVENOUS
Status: COMPLETED | OUTPATIENT
Start: 2020-01-01 | End: 2020-01-01

## 2020-01-01 RX ORDER — FUROSEMIDE 10 MG/ML
20 INJECTION INTRAMUSCULAR; INTRAVENOUS ONCE
Status: COMPLETED | OUTPATIENT
Start: 2020-01-01 | End: 2020-01-01

## 2020-01-01 RX ORDER — CHOLECALCIFEROL (VITAMIN D3) 50 MCG
2000 TABLET ORAL DAILY
COMMUNITY

## 2020-01-01 RX ORDER — MELATONIN
1000 DAILY
Status: DISCONTINUED | OUTPATIENT
Start: 2020-01-01 | End: 2020-01-01 | Stop reason: HOSPADM

## 2020-01-01 RX ORDER — DEXMEDETOMIDINE HYDROCHLORIDE 4 UG/ML
.2-1.5 INJECTION, SOLUTION INTRAVENOUS
Status: DISCONTINUED | OUTPATIENT
Start: 2020-01-01 | End: 2020-01-01

## 2020-01-01 RX ORDER — LISINOPRIL 20 MG/1
40 TABLET ORAL DAILY
Status: DISCONTINUED | OUTPATIENT
Start: 2020-01-01 | End: 2020-01-01

## 2020-01-01 RX ORDER — ATORVASTATIN CALCIUM 10 MG/1
10 TABLET, FILM COATED ORAL NIGHTLY
Status: DISCONTINUED | OUTPATIENT
Start: 2020-01-01 | End: 2020-01-01 | Stop reason: HOSPADM

## 2020-01-01 RX ORDER — MIDAZOLAM HYDROCHLORIDE 1 MG/ML
5 INJECTION, SOLUTION INTRAMUSCULAR; INTRAVENOUS ONCE
Status: COMPLETED | OUTPATIENT
Start: 2020-01-01 | End: 2020-01-01

## 2020-01-01 RX ORDER — ROCURONIUM BROMIDE 10 MG/ML
250 INJECTION, SOLUTION INTRAVENOUS ONCE AS NEEDED
Status: DISCONTINUED | OUTPATIENT
Start: 2020-01-01 | End: 2020-01-01 | Stop reason: HOSPADM

## 2020-01-01 RX ORDER — CLONIDINE HYDROCHLORIDE 0.1 MG/1
0.1 TABLET ORAL 2 TIMES DAILY
Status: DISCONTINUED | OUTPATIENT
Start: 2020-01-01 | End: 2020-01-01

## 2020-01-01 RX ORDER — LISINOPRIL 20 MG/1
20 TABLET ORAL DAILY
Status: DISCONTINUED | OUTPATIENT
Start: 2020-01-01 | End: 2020-01-01

## 2020-01-01 RX ORDER — LANOLIN ALCOHOL/MO/W.PET/CERES
3 CREAM (GRAM) TOPICAL NIGHTLY
Status: DISCONTINUED | OUTPATIENT
Start: 2020-01-01 | End: 2020-01-01

## 2020-01-01 RX ORDER — ASCORBIC ACID 500 MG
500 TABLET ORAL DAILY
Status: DISCONTINUED | OUTPATIENT
Start: 2020-01-01 | End: 2020-01-01 | Stop reason: HOSPADM

## 2020-01-01 RX ORDER — MORPHINE SULFATE 10 MG/ML
5 INJECTION INTRAMUSCULAR; INTRAVENOUS; SUBCUTANEOUS ONCE
Status: DISCONTINUED | OUTPATIENT
Start: 2020-01-01 | End: 2020-01-01 | Stop reason: HOSPADM

## 2020-01-01 RX ORDER — KETOROLAC TROMETHAMINE 30 MG/ML
15 INJECTION, SOLUTION INTRAMUSCULAR; INTRAVENOUS EVERY 6 HOURS PRN
Status: DISPENSED | OUTPATIENT
Start: 2020-01-01 | End: 2020-01-01

## 2020-01-01 RX ORDER — PROPOFOL 10 MG/ML
INJECTION, EMULSION INTRAVENOUS
Status: COMPLETED
Start: 2020-01-01 | End: 2020-01-01

## 2020-01-01 RX ORDER — LABETALOL HYDROCHLORIDE 5 MG/ML
10 INJECTION, SOLUTION INTRAVENOUS EVERY 6 HOURS PRN
Status: DISCONTINUED | OUTPATIENT
Start: 2020-01-01 | End: 2020-01-01 | Stop reason: HOSPADM

## 2020-01-01 RX ORDER — SODIUM CHLORIDE 9 MG/ML
125 INJECTION, SOLUTION INTRAVENOUS CONTINUOUS
Status: DISCONTINUED | OUTPATIENT
Start: 2020-01-01 | End: 2020-01-01

## 2020-01-01 RX ORDER — CARBOXYMETHYLCELLULOSE SODIUM 5 MG/ML
1 SOLUTION/ DROPS OPHTHALMIC 4 TIMES DAILY PRN
COMMUNITY

## 2020-01-01 RX ORDER — FUROSEMIDE 10 MG/ML
20 INJECTION INTRAMUSCULAR; INTRAVENOUS DAILY
Status: COMPLETED | OUTPATIENT
Start: 2020-01-01 | End: 2020-01-01

## 2020-01-01 RX ORDER — DEXAMETHASONE SODIUM PHOSPHATE 4 MG/ML
6 INJECTION, SOLUTION INTRA-ARTICULAR; INTRALESIONAL; INTRAMUSCULAR; INTRAVENOUS; SOFT TISSUE
Status: DISCONTINUED | OUTPATIENT
Start: 2020-01-01 | End: 2020-01-01

## 2020-01-01 RX ORDER — ACETAMINOPHEN 325 MG/1
650 TABLET ORAL EVERY 4 HOURS PRN
Status: DISCONTINUED | OUTPATIENT
Start: 2020-01-01 | End: 2020-01-01 | Stop reason: HOSPADM

## 2020-01-01 RX ORDER — DEXAMETHASONE SODIUM PHOSPHATE 10 MG/ML
10 INJECTION, SOLUTION INTRAMUSCULAR; INTRAVENOUS DAILY
Status: DISCONTINUED | OUTPATIENT
Start: 2020-01-01 | End: 2020-01-01 | Stop reason: HOSPADM

## 2020-01-01 RX ORDER — LORAZEPAM 2 MG/ML
0.5 INJECTION INTRAMUSCULAR ONCE
Status: COMPLETED | OUTPATIENT
Start: 2020-01-01 | End: 2020-01-01

## 2020-01-01 RX ORDER — OXYCODONE AND ACETAMINOPHEN 7.5; 325 MG/1; MG/1
1 TABLET ORAL ONCE
Status: COMPLETED | OUTPATIENT
Start: 2020-01-01 | End: 2020-01-01

## 2020-01-01 RX ORDER — LISINOPRIL 20 MG/1
20 TABLET ORAL EVERY 12 HOURS SCHEDULED
Status: DISCONTINUED | OUTPATIENT
Start: 2020-01-01 | End: 2020-01-01

## 2020-01-01 RX ORDER — MIDAZOLAM HYDROCHLORIDE 1 MG/ML
INJECTION INTRAMUSCULAR; INTRAVENOUS
Status: DISPENSED
Start: 2020-01-01 | End: 2020-01-01

## 2020-01-01 RX ORDER — BENZONATATE 100 MG/1
200 CAPSULE ORAL EVERY 4 HOURS PRN
Status: DISCONTINUED | OUTPATIENT
Start: 2020-01-01 | End: 2020-01-01 | Stop reason: HOSPADM

## 2020-01-01 RX ORDER — SODIUM CHLORIDE 9 MG/ML
50 INJECTION, SOLUTION INTRAVENOUS CONTINUOUS
Status: DISCONTINUED | OUTPATIENT
Start: 2020-01-01 | End: 2020-01-01 | Stop reason: HOSPADM

## 2020-01-01 RX ORDER — BENAZEPRIL HYDROCHLORIDE AND HYDROCHLOROTHIAZIDE 20; 12.5 MG/1; MG/1
1 TABLET ORAL DAILY
Status: ON HOLD | COMMUNITY
End: 2020-01-01

## 2020-01-01 RX ORDER — ZINC SULFATE 50(220)MG
220 CAPSULE ORAL DAILY
Status: DISCONTINUED | OUTPATIENT
Start: 2020-01-01 | End: 2020-01-01 | Stop reason: HOSPADM

## 2020-01-01 RX ORDER — ONDANSETRON 4 MG/1
4 TABLET, FILM COATED ORAL EVERY 6 HOURS PRN
Status: DISCONTINUED | OUTPATIENT
Start: 2020-01-01 | End: 2020-01-01 | Stop reason: HOSPADM

## 2020-01-01 RX ORDER — METOPROLOL TARTRATE 50 MG/1
25 TABLET, FILM COATED ORAL 2 TIMES DAILY
COMMUNITY

## 2020-01-01 RX ORDER — LIDOCAINE HYDROCHLORIDE 10 MG/ML
INJECTION, SOLUTION INFILTRATION; PERINEURAL
Status: DISPENSED
Start: 2020-01-01 | End: 2020-01-01

## 2020-01-01 RX ORDER — DEXTROMETHORPHAN POLISTIREX 30 MG/5ML
10 SUSPENSION ORAL EVERY 12 HOURS PRN
Status: DISCONTINUED | OUTPATIENT
Start: 2020-01-01 | End: 2020-01-01 | Stop reason: HOSPADM

## 2020-01-01 RX ORDER — ACETAMINOPHEN 650 MG/1
650 SUPPOSITORY RECTAL EVERY 4 HOURS PRN
Status: DISCONTINUED | OUTPATIENT
Start: 2020-01-01 | End: 2020-01-01 | Stop reason: HOSPADM

## 2020-01-01 RX ORDER — CLONIDINE HYDROCHLORIDE 0.1 MG/1
0.1 TABLET ORAL EVERY 12 HOURS SCHEDULED
Status: DISCONTINUED | OUTPATIENT
Start: 2020-01-01 | End: 2020-01-01

## 2020-01-01 RX ORDER — DEXAMETHASONE 6 MG/1
6 TABLET ORAL
COMMUNITY
Start: 2020-01-01

## 2020-01-01 RX ORDER — HYDROCODONE BITARTRATE AND ACETAMINOPHEN 5; 325 MG/1; MG/1
1 TABLET ORAL EVERY 6 HOURS PRN
Status: DISCONTINUED | OUTPATIENT
Start: 2020-01-01 | End: 2020-01-01

## 2020-01-01 RX ORDER — CLONIDINE HYDROCHLORIDE 0.2 MG/1
0.2 TABLET ORAL 2 TIMES DAILY
COMMUNITY

## 2020-01-01 RX ORDER — SODIUM CHLORIDE 0.9 % (FLUSH) 0.9 %
10 SYRINGE (ML) INJECTION EVERY 12 HOURS SCHEDULED
Status: DISCONTINUED | OUTPATIENT
Start: 2020-01-01 | End: 2020-01-01 | Stop reason: HOSPADM

## 2020-01-01 RX ORDER — ROCURONIUM BROMIDE 10 MG/ML
500 INJECTION, SOLUTION INTRAVENOUS ONCE
Status: COMPLETED | OUTPATIENT
Start: 2020-01-01 | End: 2020-01-01

## 2020-01-01 RX ADMIN — LISINOPRIL 20 MG: 20 TABLET ORAL at 19:46

## 2020-01-01 RX ADMIN — PHENYLEPHRINE HYDROCHLORIDE 3 MCG/KG/MIN: 10 INJECTION INTRAVENOUS at 12:09

## 2020-01-01 RX ADMIN — DEXAMETHASONE SODIUM PHOSPHATE 10 MG: 10 INJECTION INTRAMUSCULAR; INTRAVENOUS at 10:00

## 2020-01-01 RX ADMIN — CLONIDINE HYDROCHLORIDE 0.1 MG: 0.1 TABLET ORAL at 21:08

## 2020-01-01 RX ADMIN — OXYCODONE HYDROCHLORIDE AND ACETAMINOPHEN 500 MG: 500 TABLET ORAL at 10:03

## 2020-01-01 RX ADMIN — OXYCODONE HYDROCHLORIDE AND ACETAMINOPHEN 500 MG: 500 TABLET ORAL at 09:10

## 2020-01-01 RX ADMIN — ACETAMINOPHEN 650 MG: 325 TABLET, FILM COATED ORAL at 07:54

## 2020-01-01 RX ADMIN — CLONIDINE HYDROCHLORIDE 0.1 MG: 0.1 TABLET ORAL at 08:12

## 2020-01-01 RX ADMIN — METOPROLOL TARTRATE 25 MG: 25 TABLET, FILM COATED ORAL at 20:15

## 2020-01-01 RX ADMIN — SODIUM CHLORIDE, PRESERVATIVE FREE 10 ML: 5 INJECTION INTRAVENOUS at 09:20

## 2020-01-01 RX ADMIN — LISINOPRIL 40 MG: 20 TABLET ORAL at 20:02

## 2020-01-01 RX ADMIN — ENOXAPARIN SODIUM 40 MG: 40 INJECTION SUBCUTANEOUS at 07:54

## 2020-01-01 RX ADMIN — CEFTRIAXONE 1 G: 1 INJECTION, POWDER, FOR SOLUTION INTRAMUSCULAR; INTRAVENOUS at 18:52

## 2020-01-01 RX ADMIN — FAMOTIDINE 20 MG: 20 TABLET, FILM COATED ORAL at 07:53

## 2020-01-01 RX ADMIN — CEFTRIAXONE 1 G: 1 INJECTION, POWDER, FOR SOLUTION INTRAMUSCULAR; INTRAVENOUS at 18:57

## 2020-01-01 RX ADMIN — LISINOPRIL 40 MG: 20 TABLET ORAL at 10:02

## 2020-01-01 RX ADMIN — SODIUM CHLORIDE, PRESERVATIVE FREE 10 ML: 5 INJECTION INTRAVENOUS at 20:21

## 2020-01-01 RX ADMIN — LISINOPRIL 20 MG: 20 TABLET ORAL at 20:27

## 2020-01-01 RX ADMIN — LISINOPRIL 20 MG: 20 TABLET ORAL at 08:01

## 2020-01-01 RX ADMIN — DEXAMETHASONE SODIUM PHOSPHATE 10 MG: 10 INJECTION INTRAMUSCULAR; INTRAVENOUS at 08:19

## 2020-01-01 RX ADMIN — CLONIDINE HYDROCHLORIDE 0.1 MG: 0.1 TABLET ORAL at 06:09

## 2020-01-01 RX ADMIN — KETOROLAC TROMETHAMINE 15 MG: 30 INJECTION, SOLUTION INTRAMUSCULAR; INTRAVENOUS at 12:22

## 2020-01-01 RX ADMIN — ENOXAPARIN SODIUM 110 MG: 120 INJECTION SUBCUTANEOUS at 09:18

## 2020-01-01 RX ADMIN — KETOROLAC TROMETHAMINE 15 MG: 30 INJECTION, SOLUTION INTRAMUSCULAR; INTRAVENOUS at 14:57

## 2020-01-01 RX ADMIN — ACETAMINOPHEN 650 MG: 325 TABLET, FILM COATED ORAL at 13:20

## 2020-01-01 RX ADMIN — ACETAMINOPHEN 650 MG: 325 TABLET, FILM COATED ORAL at 14:41

## 2020-01-01 RX ADMIN — SODIUM CHLORIDE, PRESERVATIVE FREE 10 ML: 5 INJECTION INTRAVENOUS at 08:01

## 2020-01-01 RX ADMIN — FAMOTIDINE 20 MG: 20 TABLET, FILM COATED ORAL at 08:01

## 2020-01-01 RX ADMIN — ATORVASTATIN CALCIUM 10 MG: 10 TABLET, FILM COATED ORAL at 19:47

## 2020-01-01 RX ADMIN — FAMOTIDINE 20 MG: 20 TABLET, FILM COATED ORAL at 19:46

## 2020-01-01 RX ADMIN — KETOROLAC TROMETHAMINE 15 MG: 30 INJECTION, SOLUTION INTRAMUSCULAR; INTRAVENOUS at 05:45

## 2020-01-01 RX ADMIN — DEXAMETHASONE SODIUM PHOSPHATE 10 MG: 10 INJECTION INTRAMUSCULAR; INTRAVENOUS at 08:12

## 2020-01-01 RX ADMIN — ROCURONIUM BROMIDE 8 MCG/KG/MIN: 10 INJECTION, SOLUTION INTRAVENOUS at 02:46

## 2020-01-01 RX ADMIN — ZINC SULFATE 220 MG (50 MG) CAPSULE 220 MG: CAPSULE at 10:02

## 2020-01-01 RX ADMIN — CHOLECALCIFEROL (VITAMIN D3) 25 MCG (1,000 UNIT) TABLET 1000 UNITS: TABLET at 09:18

## 2020-01-01 RX ADMIN — AZITHROMYCIN MONOHYDRATE 500 MG: 500 INJECTION, POWDER, LYOPHILIZED, FOR SOLUTION INTRAVENOUS at 18:29

## 2020-01-01 RX ADMIN — REMDESIVIR 100 MG: 100 INJECTION, POWDER, LYOPHILIZED, FOR SOLUTION INTRAVENOUS at 00:29

## 2020-01-01 RX ADMIN — PROPOFOL 20 MCG/KG/MIN: 10 INJECTION, EMULSION INTRAVENOUS at 12:09

## 2020-01-01 RX ADMIN — LISINOPRIL 20 MG: 20 TABLET ORAL at 07:52

## 2020-01-01 RX ADMIN — KETOROLAC TROMETHAMINE 15 MG: 30 INJECTION, SOLUTION INTRAMUSCULAR; INTRAVENOUS at 18:26

## 2020-01-01 RX ADMIN — FAMOTIDINE 20 MG: 20 TABLET, FILM COATED ORAL at 20:27

## 2020-01-01 RX ADMIN — CEFTRIAXONE 1 G: 1 INJECTION, POWDER, FOR SOLUTION INTRAMUSCULAR; INTRAVENOUS at 19:59

## 2020-01-01 RX ADMIN — DEXAMETHASONE 6 MG: 4 TABLET ORAL at 07:53

## 2020-01-01 RX ADMIN — MIDAZOLAM HYDROCHLORIDE 1 MG/HR: 5 INJECTION, SOLUTION INTRAMUSCULAR; INTRAVENOUS at 20:37

## 2020-01-01 RX ADMIN — KETOROLAC TROMETHAMINE 15 MG: 30 INJECTION, SOLUTION INTRAMUSCULAR; INTRAVENOUS at 00:06

## 2020-01-01 RX ADMIN — SUCCINYLCHOLINE CHLORIDE 150 MG: 20 INJECTION, SOLUTION INTRAMUSCULAR; INTRAVENOUS at 17:51

## 2020-01-01 RX ADMIN — FAMOTIDINE 20 MG: 20 TABLET, FILM COATED ORAL at 21:08

## 2020-01-01 RX ADMIN — HYDROCODONE BITARTRATE AND ACETAMINOPHEN 1 TABLET: 5; 325 TABLET ORAL at 09:21

## 2020-01-01 RX ADMIN — FAMOTIDINE 20 MG: 20 TABLET, FILM COATED ORAL at 10:03

## 2020-01-01 RX ADMIN — CEFTRIAXONE 1 G: 1 INJECTION, POWDER, FOR SOLUTION INTRAMUSCULAR; INTRAVENOUS at 18:26

## 2020-01-01 RX ADMIN — LISINOPRIL 20 MG: 20 TABLET ORAL at 08:12

## 2020-01-01 RX ADMIN — Medication 6 MG: at 20:15

## 2020-01-01 RX ADMIN — CLONIDINE HYDROCHLORIDE 0.1 MG: 0.1 TABLET ORAL at 08:19

## 2020-01-01 RX ADMIN — DEXAMETHASONE 6 MG: 4 TABLET ORAL at 09:18

## 2020-01-01 RX ADMIN — CHOLECALCIFEROL (VITAMIN D3) 25 MCG (1,000 UNIT) TABLET 1000 UNITS: TABLET at 10:03

## 2020-01-01 RX ADMIN — PHENYLEPHRINE HYDROCHLORIDE 0.5 MCG/KG/MIN: 10 INJECTION INTRAVENOUS at 05:33

## 2020-01-01 RX ADMIN — METOPROLOL TARTRATE 25 MG: 25 TABLET, FILM COATED ORAL at 20:27

## 2020-01-01 RX ADMIN — METOPROLOL TARTRATE 25 MG: 25 TABLET, FILM COATED ORAL at 08:12

## 2020-01-01 RX ADMIN — METOPROLOL TARTRATE 25 MG: 25 TABLET, FILM COATED ORAL at 09:19

## 2020-01-01 RX ADMIN — FAMOTIDINE 20 MG: 20 TABLET, FILM COATED ORAL at 20:19

## 2020-01-01 RX ADMIN — METOPROLOL TARTRATE 25 MG: 25 TABLET, FILM COATED ORAL at 07:52

## 2020-01-01 RX ADMIN — BENZONATATE 200 MG: 100 CAPSULE ORAL at 22:17

## 2020-01-01 RX ADMIN — FUROSEMIDE 20 MG: 10 INJECTION, SOLUTION INTRAMUSCULAR; INTRAVENOUS at 08:12

## 2020-01-01 RX ADMIN — METOPROLOL TARTRATE 25 MG: 25 TABLET, FILM COATED ORAL at 19:46

## 2020-01-01 RX ADMIN — PROPOFOL 50 MCG/KG/MIN: 10 INJECTION, EMULSION INTRAVENOUS at 20:36

## 2020-01-01 RX ADMIN — LABETALOL HYDROCHLORIDE 10 MG: 5 INJECTION, SOLUTION INTRAVENOUS at 06:11

## 2020-01-01 RX ADMIN — Medication 6 MG: at 20:41

## 2020-01-01 RX ADMIN — Medication 6 MG: at 20:27

## 2020-01-01 RX ADMIN — FAMOTIDINE 20 MG: 20 TABLET, FILM COATED ORAL at 20:18

## 2020-01-01 RX ADMIN — METOPROLOL TARTRATE 25 MG: 25 TABLET, FILM COATED ORAL at 08:19

## 2020-01-01 RX ADMIN — FAMOTIDINE 20 MG: 20 TABLET, FILM COATED ORAL at 08:12

## 2020-01-01 RX ADMIN — ZINC SULFATE 220 MG (50 MG) CAPSULE 220 MG: CAPSULE at 08:12

## 2020-01-01 RX ADMIN — BENZONATATE 200 MG: 100 CAPSULE ORAL at 20:16

## 2020-01-01 RX ADMIN — Medication 6 MG: at 20:18

## 2020-01-01 RX ADMIN — REMDESIVIR 100 MG: 100 INJECTION, POWDER, LYOPHILIZED, FOR SOLUTION INTRAVENOUS at 23:00

## 2020-01-01 RX ADMIN — CLONIDINE HYDROCHLORIDE 0.1 MG: 0.1 TABLET ORAL at 21:43

## 2020-01-01 RX ADMIN — KETOROLAC TROMETHAMINE 15 MG: 30 INJECTION, SOLUTION INTRAMUSCULAR; INTRAVENOUS at 12:16

## 2020-01-01 RX ADMIN — PROPOFOL 50 MCG/KG/MIN: 10 INJECTION, EMULSION INTRAVENOUS at 23:30

## 2020-01-01 RX ADMIN — SODIUM CHLORIDE, PRESERVATIVE FREE 10 ML: 5 INJECTION INTRAVENOUS at 08:19

## 2020-01-01 RX ADMIN — SODIUM CHLORIDE, PRESERVATIVE FREE 10 ML: 5 INJECTION INTRAVENOUS at 21:08

## 2020-01-01 RX ADMIN — SODIUM CHLORIDE 125 ML/HR: 9 INJECTION, SOLUTION INTRAVENOUS at 22:25

## 2020-01-01 RX ADMIN — LISINOPRIL 20 MG: 20 TABLET ORAL at 20:18

## 2020-01-01 RX ADMIN — CLONIDINE HYDROCHLORIDE 0.1 MG: 0.1 TABLET ORAL at 09:19

## 2020-01-01 RX ADMIN — METOPROLOL TARTRATE 25 MG: 25 TABLET, FILM COATED ORAL at 08:01

## 2020-01-01 RX ADMIN — FUROSEMIDE 20 MG: 10 INJECTION, SOLUTION INTRAMUSCULAR; INTRAVENOUS at 08:19

## 2020-01-01 RX ADMIN — SODIUM CHLORIDE, PRESERVATIVE FREE 10 ML: 5 INJECTION INTRAVENOUS at 20:16

## 2020-01-01 RX ADMIN — LABETALOL HYDROCHLORIDE 10 MG: 5 INJECTION, SOLUTION INTRAVENOUS at 19:39

## 2020-01-01 RX ADMIN — FAMOTIDINE 20 MG: 20 TABLET, FILM COATED ORAL at 09:19

## 2020-01-01 RX ADMIN — ATORVASTATIN CALCIUM 10 MG: 10 TABLET, FILM COATED ORAL at 20:41

## 2020-01-01 RX ADMIN — CLONIDINE HYDROCHLORIDE 0.1 MG: 0.1 TABLET ORAL at 20:18

## 2020-01-01 RX ADMIN — SODIUM CHLORIDE, PRESERVATIVE FREE 10 ML: 5 INJECTION INTRAVENOUS at 12:18

## 2020-01-01 RX ADMIN — ZINC SULFATE 220 MG (50 MG) CAPSULE 220 MG: CAPSULE at 08:19

## 2020-01-01 RX ADMIN — METOPROLOL TARTRATE 25 MG: 25 TABLET, FILM COATED ORAL at 09:10

## 2020-01-01 RX ADMIN — HYDROCODONE BITARTRATE AND ACETAMINOPHEN 1 TABLET: 5; 325 TABLET ORAL at 03:18

## 2020-01-01 RX ADMIN — Medication 6 MG: at 21:08

## 2020-01-01 RX ADMIN — MIDAZOLAM HYDROCHLORIDE 5 MG: 1 INJECTION, SOLUTION INTRAMUSCULAR; INTRAVENOUS at 19:08

## 2020-01-01 RX ADMIN — ZINC SULFATE 220 MG (50 MG) CAPSULE 220 MG: CAPSULE at 08:01

## 2020-01-01 RX ADMIN — ATORVASTATIN CALCIUM 10 MG: 10 TABLET, FILM COATED ORAL at 20:20

## 2020-01-01 RX ADMIN — KETOROLAC TROMETHAMINE 15 MG: 30 INJECTION, SOLUTION INTRAMUSCULAR; INTRAVENOUS at 03:22

## 2020-01-01 RX ADMIN — ATORVASTATIN CALCIUM 10 MG: 10 TABLET, FILM COATED ORAL at 20:15

## 2020-01-01 RX ADMIN — Medication 6 MG: at 19:46

## 2020-01-01 RX ADMIN — FUROSEMIDE 20 MG: 10 INJECTION, SOLUTION INTRAMUSCULAR; INTRAVENOUS at 05:21

## 2020-01-01 RX ADMIN — LORAZEPAM 0.5 MG: 2 INJECTION INTRAMUSCULAR; INTRAVENOUS at 23:51

## 2020-01-01 RX ADMIN — CLONIDINE HYDROCHLORIDE 0.1 MG: 0.1 TABLET ORAL at 07:53

## 2020-01-01 RX ADMIN — PROPOFOL 50 MCG/KG/MIN: 10 INJECTION, EMULSION INTRAVENOUS at 05:54

## 2020-01-01 RX ADMIN — OXYCODONE HYDROCHLORIDE AND ACETAMINOPHEN 500 MG: 500 TABLET ORAL at 09:19

## 2020-01-01 RX ADMIN — SODIUM CHLORIDE, PRESERVATIVE FREE 10 ML: 5 INJECTION INTRAVENOUS at 09:11

## 2020-01-01 RX ADMIN — SODIUM CHLORIDE, PRESERVATIVE FREE 10 ML: 5 INJECTION INTRAVENOUS at 21:10

## 2020-01-01 RX ADMIN — OXYCODONE HYDROCHLORIDE AND ACETAMINOPHEN 500 MG: 500 TABLET ORAL at 08:19

## 2020-01-01 RX ADMIN — ZINC SULFATE 220 MG (50 MG) CAPSULE 220 MG: CAPSULE at 09:19

## 2020-01-01 RX ADMIN — ENOXAPARIN SODIUM 40 MG: 40 INJECTION, SOLUTION INTRAVENOUS; SUBCUTANEOUS at 08:12

## 2020-01-01 RX ADMIN — LABETALOL HYDROCHLORIDE 10 MG: 5 INJECTION, SOLUTION INTRAVENOUS at 06:54

## 2020-01-01 RX ADMIN — METOPROLOL TARTRATE 25 MG: 25 TABLET, FILM COATED ORAL at 10:03

## 2020-01-01 RX ADMIN — FAMOTIDINE 20 MG: 20 TABLET, FILM COATED ORAL at 09:18

## 2020-01-01 RX ADMIN — ENOXAPARIN SODIUM 40 MG: 40 INJECTION, SOLUTION INTRAVENOUS; SUBCUTANEOUS at 21:08

## 2020-01-01 RX ADMIN — CEFTRIAXONE 1 G: 1 INJECTION, POWDER, FOR SOLUTION INTRAMUSCULAR; INTRAVENOUS at 17:21

## 2020-01-01 RX ADMIN — ONDANSETRON HYDROCHLORIDE 4 MG: 2 SOLUTION INTRAMUSCULAR; INTRAVENOUS at 10:26

## 2020-01-01 RX ADMIN — ENOXAPARIN SODIUM 110 MG: 120 INJECTION SUBCUTANEOUS at 22:24

## 2020-01-01 RX ADMIN — SODIUM CHLORIDE, PRESERVATIVE FREE 10 ML: 5 INJECTION INTRAVENOUS at 22:23

## 2020-01-01 RX ADMIN — SODIUM CHLORIDE, PRESERVATIVE FREE 10 ML: 5 INJECTION INTRAVENOUS at 09:18

## 2020-01-01 RX ADMIN — ENOXAPARIN SODIUM 40 MG: 40 INJECTION SUBCUTANEOUS at 12:10

## 2020-01-01 RX ADMIN — CHOLECALCIFEROL (VITAMIN D3) 25 MCG (1,000 UNIT) TABLET 1000 UNITS: TABLET at 09:19

## 2020-01-01 RX ADMIN — OXYCODONE HYDROCHLORIDE AND ACETAMINOPHEN 1 TABLET: 7.5; 325 TABLET ORAL at 19:26

## 2020-01-01 RX ADMIN — KETOROLAC TROMETHAMINE 15 MG: 30 INJECTION, SOLUTION INTRAMUSCULAR; INTRAVENOUS at 18:30

## 2020-01-01 RX ADMIN — CHOLECALCIFEROL (VITAMIN D3) 25 MCG (1,000 UNIT) TABLET 1000 UNITS: TABLET at 08:01

## 2020-01-01 RX ADMIN — DEXMEDETOMIDINE HYDROCHLORIDE 0.3 MCG/KG/HR: 4 INJECTION, SOLUTION INTRAVENOUS at 19:07

## 2020-01-01 RX ADMIN — KETOROLAC TROMETHAMINE 15 MG: 30 INJECTION, SOLUTION INTRAMUSCULAR; INTRAVENOUS at 17:02

## 2020-01-01 RX ADMIN — ENOXAPARIN SODIUM 40 MG: 40 INJECTION SUBCUTANEOUS at 08:19

## 2020-01-01 RX ADMIN — ENOXAPARIN SODIUM 110 MG: 120 INJECTION SUBCUTANEOUS at 21:33

## 2020-01-01 RX ADMIN — ZINC SULFATE 220 MG (50 MG) CAPSULE 220 MG: CAPSULE at 09:11

## 2020-01-01 RX ADMIN — DEXAMETHASONE 6 MG: 4 TABLET ORAL at 07:50

## 2020-01-01 RX ADMIN — ETOMIDATE 10 MG: 20 INJECTION, SOLUTION INTRAVENOUS at 17:50

## 2020-01-01 RX ADMIN — AZITHROMYCIN MONOHYDRATE 500 MG: 500 INJECTION, POWDER, LYOPHILIZED, FOR SOLUTION INTRAVENOUS at 19:59

## 2020-01-01 RX ADMIN — CLONIDINE HYDROCHLORIDE 0.1 MG: 0.1 TABLET ORAL at 20:27

## 2020-01-01 RX ADMIN — KETOROLAC TROMETHAMINE 15 MG: 30 INJECTION, SOLUTION INTRAMUSCULAR; INTRAVENOUS at 06:06

## 2020-01-01 RX ADMIN — CEFTRIAXONE 1 G: 1 INJECTION, POWDER, FOR SOLUTION INTRAMUSCULAR; INTRAVENOUS at 17:36

## 2020-01-01 RX ADMIN — ENOXAPARIN SODIUM 40 MG: 40 INJECTION SUBCUTANEOUS at 08:01

## 2020-01-01 RX ADMIN — ENOXAPARIN SODIUM 60 MG: 60 INJECTION SUBCUTANEOUS at 12:52

## 2020-01-01 RX ADMIN — ZINC SULFATE 220 MG (50 MG) CAPSULE 220 MG: CAPSULE at 09:17

## 2020-01-01 RX ADMIN — SODIUM CHLORIDE 50 ML/HR: 9 INJECTION, SOLUTION INTRAVENOUS at 12:53

## 2020-01-01 RX ADMIN — OXYCODONE HYDROCHLORIDE AND ACETAMINOPHEN 500 MG: 500 TABLET ORAL at 08:01

## 2020-01-01 RX ADMIN — Medication 3 MG: at 22:23

## 2020-01-01 RX ADMIN — CHOLECALCIFEROL (VITAMIN D3) 25 MCG (1,000 UNIT) TABLET 1000 UNITS: TABLET at 08:19

## 2020-01-01 RX ADMIN — KETOROLAC TROMETHAMINE 15 MG: 30 INJECTION, SOLUTION INTRAMUSCULAR; INTRAVENOUS at 07:51

## 2020-01-01 RX ADMIN — ENOXAPARIN SODIUM 40 MG: 40 INJECTION SUBCUTANEOUS at 09:19

## 2020-01-01 RX ADMIN — DEXAMETHASONE 6 MG: 4 TABLET ORAL at 10:02

## 2020-01-01 RX ADMIN — CHOLECALCIFEROL (VITAMIN D3) 25 MCG (1,000 UNIT) TABLET 1000 UNITS: TABLET at 07:51

## 2020-01-01 RX ADMIN — KETOROLAC TROMETHAMINE 15 MG: 30 INJECTION, SOLUTION INTRAMUSCULAR; INTRAVENOUS at 00:54

## 2020-01-01 RX ADMIN — METOPROLOL TARTRATE 25 MG: 25 TABLET, FILM COATED ORAL at 21:08

## 2020-01-01 RX ADMIN — KETOROLAC TROMETHAMINE 15 MG: 30 INJECTION, SOLUTION INTRAMUSCULAR; INTRAVENOUS at 11:13

## 2020-01-01 RX ADMIN — FUROSEMIDE 20 MG: 10 INJECTION, SOLUTION INTRAMUSCULAR; INTRAVENOUS at 20:19

## 2020-01-01 RX ADMIN — NOREPINEPHRINE BITARTRATE 0.02 MCG/KG/MIN: 1 INJECTION INTRAVENOUS at 20:30

## 2020-01-01 RX ADMIN — METOPROLOL TARTRATE 25 MG: 25 TABLET, FILM COATED ORAL at 20:41

## 2020-01-01 RX ADMIN — FAMOTIDINE 20 MG: 20 TABLET, FILM COATED ORAL at 09:10

## 2020-01-01 RX ADMIN — SODIUM CHLORIDE, PRESERVATIVE FREE 10 ML: 5 INJECTION INTRAVENOUS at 20:42

## 2020-01-01 RX ADMIN — ATORVASTATIN CALCIUM 10 MG: 10 TABLET, FILM COATED ORAL at 21:08

## 2020-01-01 RX ADMIN — SODIUM CHLORIDE, PRESERVATIVE FREE 10 ML: 5 INJECTION INTRAVENOUS at 20:28

## 2020-01-01 RX ADMIN — FAMOTIDINE 20 MG: 20 TABLET, FILM COATED ORAL at 21:59

## 2020-01-01 RX ADMIN — DEXAMETHASONE SODIUM PHOSPHATE 10 MG: 10 INJECTION, SOLUTION INTRAMUSCULAR; INTRAVENOUS at 17:26

## 2020-01-01 RX ADMIN — REMDESIVIR 200 MG: 100 INJECTION, POWDER, LYOPHILIZED, FOR SOLUTION INTRAVENOUS at 00:48

## 2020-01-01 RX ADMIN — METOPROLOL TARTRATE 25 MG: 25 TABLET, FILM COATED ORAL at 20:18

## 2020-01-01 RX ADMIN — DEXAMETHASONE 6 MG: 4 TABLET ORAL at 09:19

## 2020-01-01 RX ADMIN — DEXAMETHASONE 6 MG: 4 TABLET ORAL at 09:10

## 2020-01-01 RX ADMIN — KETOROLAC TROMETHAMINE 15 MG: 30 INJECTION, SOLUTION INTRAMUSCULAR; INTRAVENOUS at 06:52

## 2020-01-01 RX ADMIN — CHOLECALCIFEROL (VITAMIN D3) 25 MCG (1,000 UNIT) TABLET 1000 UNITS: TABLET at 09:10

## 2020-01-01 RX ADMIN — ZINC SULFATE 220 MG (50 MG) CAPSULE 220 MG: CAPSULE at 07:51

## 2020-01-01 RX ADMIN — FAMOTIDINE 20 MG: 20 TABLET, FILM COATED ORAL at 08:19

## 2020-01-01 RX ADMIN — OXYCODONE HYDROCHLORIDE AND ACETAMINOPHEN 500 MG: 500 TABLET ORAL at 07:53

## 2020-01-01 RX ADMIN — FAMOTIDINE 20 MG: 20 TABLET, FILM COATED ORAL at 20:15

## 2020-01-01 RX ADMIN — KETOROLAC TROMETHAMINE 15 MG: 30 INJECTION, SOLUTION INTRAMUSCULAR; INTRAVENOUS at 21:47

## 2020-01-01 RX ADMIN — REMDESIVIR 100 MG: 100 INJECTION, POWDER, LYOPHILIZED, FOR SOLUTION INTRAVENOUS at 00:06

## 2020-01-01 RX ADMIN — FAMOTIDINE 20 MG: 20 TABLET, FILM COATED ORAL at 20:41

## 2020-01-01 RX ADMIN — FENTANYL CITRATE 50 MCG/HR: 50 INJECTION, SOLUTION INTRAMUSCULAR; INTRAVENOUS at 23:30

## 2020-01-01 RX ADMIN — METOPROLOL TARTRATE 25 MG: 25 TABLET, FILM COATED ORAL at 20:02

## 2020-01-01 RX ADMIN — PROPOFOL 40 MCG/KG/MIN: 10 INJECTION, EMULSION INTRAVENOUS at 19:07

## 2020-01-01 RX ADMIN — ETOMIDATE 20 MG: 20 INJECTION, SOLUTION INTRAVENOUS at 17:49

## 2020-01-01 RX ADMIN — LISINOPRIL 20 MG: 20 TABLET ORAL at 09:19

## 2020-01-01 RX ADMIN — ATORVASTATIN CALCIUM 10 MG: 10 TABLET, FILM COATED ORAL at 20:18

## 2020-01-01 RX ADMIN — ATORVASTATIN CALCIUM 10 MG: 10 TABLET, FILM COATED ORAL at 20:27

## 2020-01-01 RX ADMIN — SODIUM CHLORIDE, PRESERVATIVE FREE 10 ML: 5 INJECTION INTRAVENOUS at 19:47

## 2020-01-01 RX ADMIN — KETOROLAC TROMETHAMINE 15 MG: 30 INJECTION, SOLUTION INTRAMUSCULAR; INTRAVENOUS at 21:44

## 2020-01-01 RX ADMIN — CHOLECALCIFEROL (VITAMIN D3) 25 MCG (1,000 UNIT) TABLET 1000 UNITS: TABLET at 08:12

## 2020-01-01 RX ADMIN — CEFTRIAXONE 1 G: 1 INJECTION, POWDER, FOR SOLUTION INTRAMUSCULAR; INTRAVENOUS at 18:18

## 2020-01-01 RX ADMIN — ROCURONIUM BROMIDE 53.5 MG: 10 INJECTION INTRAVENOUS at 22:21

## 2020-01-01 RX ADMIN — OXYCODONE HYDROCHLORIDE AND ACETAMINOPHEN 500 MG: 500 TABLET ORAL at 09:17

## 2020-01-01 RX ADMIN — SODIUM CHLORIDE, PRESERVATIVE FREE 10 ML: 5 INJECTION INTRAVENOUS at 07:55

## 2020-01-01 RX ADMIN — OXYCODONE HYDROCHLORIDE AND ACETAMINOPHEN 500 MG: 500 TABLET ORAL at 08:12

## 2020-01-01 RX ADMIN — KIT FOR THE PREPARATION OF TECHNETIUM TC 99M ALBUMIN AGGREGATED 1 DOSE: 2.5 INJECTION, POWDER, FOR SOLUTION INTRAVENOUS at 09:45

## 2020-01-01 RX ADMIN — LISINOPRIL 20 MG: 20 TABLET ORAL at 08:19

## 2020-01-01 RX ADMIN — PROPOFOL 50 MCG/KG/MIN: 10 INJECTION, EMULSION INTRAVENOUS at 02:43

## 2020-01-01 RX ADMIN — LISINOPRIL 20 MG: 20 TABLET ORAL at 21:08

## 2020-01-01 RX ADMIN — SODIUM CHLORIDE, PRESERVATIVE FREE 10 ML: 5 INJECTION INTRAVENOUS at 20:19

## 2020-01-01 RX ADMIN — ENOXAPARIN SODIUM 40 MG: 40 INJECTION SUBCUTANEOUS at 09:10

## 2020-01-01 RX ADMIN — ACETAMINOPHEN 650 MG: 325 TABLET, FILM COATED ORAL at 08:29

## 2020-01-01 RX ADMIN — Medication 6 MG: at 20:00

## 2020-01-01 RX ADMIN — REMDESIVIR 100 MG: 100 INJECTION, POWDER, LYOPHILIZED, FOR SOLUTION INTRAVENOUS at 23:39

## 2020-01-01 RX ADMIN — FUROSEMIDE 20 MG: 10 INJECTION, SOLUTION INTRAMUSCULAR; INTRAVENOUS at 14:41

## 2020-12-02 PROBLEM — J96.01 ACUTE RESPIRATORY FAILURE WITH HYPOXIA (HCC): Status: ACTIVE | Noted: 2020-01-01

## 2020-12-02 PROBLEM — U07.1 COVID-19: Status: ACTIVE | Noted: 2020-01-01

## 2020-12-02 PROBLEM — J12.82 PNEUMONIA DUE TO COVID-19 VIRUS: Status: ACTIVE | Noted: 2020-01-01

## 2020-12-02 PROBLEM — U07.1 PNEUMONIA DUE TO COVID-19 VIRUS: Status: ACTIVE | Noted: 2020-01-01

## 2020-12-02 NOTE — ED PROVIDER NOTES
Subjective   Patient complains of SOB getting worse over the past few days.  She was diagnosed with Covid about a week ago.  She had had symptoms about a week before that.  She says in the last few days the shortness of breath is got much worse.  She has been on Decadron but it has not helped.  She does started on oxygen at home yesterday but it is continued to worsen so she came here.  She is having a lot of cough but not much production with it.  She is having some sharp pain in her left chest whenever she breathes deeply or coughs.      History provided by:  Patient   used: No    Shortness of Breath  Severity:  Severe  Onset quality:  Gradual  Duration:  1 week  Timing:  Constant  Progression:  Worsening  Chronicity:  New  Context: URI    Context: not animal exposure, not emotional upset, not fumes, not known allergens, not occupational exposure and not smoke exposure    Relieved by:  Nothing  Worsened by:  Nothing  Ineffective treatments:  Oxygen  Associated symptoms: chest pain and cough    Associated symptoms: no abdominal pain, no diaphoresis, no fever, no hemoptysis, no PND, no sore throat, no syncope, no swollen glands and no vomiting    Risk factors: no recent alcohol use, no family hx of DVT, no hx of cancer, no hx of PE/DVT, no oral contraceptive use, no prolonged immobilization and no tobacco use        Review of Systems   Constitutional: Negative.  Negative for diaphoresis and fever.   HENT: Negative.  Negative for sore throat.    Respiratory: Positive for cough and shortness of breath. Negative for hemoptysis.    Cardiovascular: Positive for chest pain. Negative for syncope and PND.   Gastrointestinal: Negative.  Negative for abdominal pain and vomiting.   Genitourinary: Negative.    Musculoskeletal: Negative.    Skin: Negative.    Neurological: Negative.    Psychiatric/Behavioral: Negative.    All other systems reviewed and are negative.      No past medical history on file.    No  Known Allergies    No past surgical history on file.    No family history on file.    Social History     Socioeconomic History   • Marital status:      Spouse name: Not on file   • Number of children: Not on file   • Years of education: Not on file   • Highest education level: Not on file       Prior to Admission medications    Not on File       Medications   sodium chloride 0.9 % flush 10 mL (has no administration in time range)   oxyCODONE-acetaminophen (PERCOCET) 7.5-325 MG per tablet 1 tablet (has no administration in time range)   dexamethasone sodium phosphate injection 10 mg (10 mg Intravenous Given 12/2/20 1726)       Vitals:    12/02/20 1900   BP: 142/96   Pulse: 96   Resp:    Temp:    SpO2: (!) 87%         Objective   Physical Exam  Vitals signs and nursing note reviewed.   Constitutional:       Appearance: She is well-developed.   HENT:      Head: Normocephalic and atraumatic.   Neck:      Musculoskeletal: Normal range of motion.   Cardiovascular:      Rate and Rhythm: Regular rhythm. Tachycardia present.   Pulmonary:      Effort: Tachypnea and respiratory distress present.      Breath sounds: Examination of the right-middle field reveals rhonchi. Examination of the left-middle field reveals rhonchi. Rhonchi present.   Abdominal:      General: Bowel sounds are normal.      Palpations: Abdomen is soft.   Musculoskeletal: Normal range of motion.   Skin:     General: Skin is warm and dry.   Neurological:      General: No focal deficit present.      Mental Status: She is alert and oriented to person, place, and time.   Psychiatric:         Mood and Affect: Mood normal.         Behavior: Behavior normal.         Procedures         Lab Results (last 24 hours)     Procedure Component Value Units Date/Time    CBC & Differential [[id number]]  (Abnormal) Collected: 12/02/20 1615    Specimen: Blood from Arm, Left Updated: 12/02/20 1641    Narrative:      The following orders were created for panel order CBC &  Differential.  Procedure                               Abnormality         Status                     ---------                               -----------         ------                     CBC Auto Differential[[id number]]        Abnormal            Final result                 Please view results for these tests on the individual orders.    D-dimer, Quantitative [[id number]]  (Abnormal) Collected: 12/02/20 1615    Specimen: Blood from Arm, Left Updated: 12/02/20 1651     D-Dimer, Quantitative 0.69 mg/L (FEU)     Narrative:      Reference Range is 0-0.50 mg/L FEU. However, results <0.50 mg/L FEU tends to rule out DVT or PE. Results >0.50 mg/L FEU are not useful in predicting absence or presence of DVT or PE.      Blood Culture - Blood, Arm, Left [[id number]] Collected: 12/02/20 1615    Specimen: Blood from Arm, Left Updated: 12/02/20 1654    Lactic Acid, Plasma [[id number]]  (Normal) Collected: 12/02/20 1615    Specimen: Blood from Arm, Left Updated: 12/02/20 1705     Lactate 1.9 mmol/L     CBC Auto Differential [[id number]]  (Abnormal) Collected: 12/02/20 1615    Specimen: Blood from Arm, Left Updated: 12/02/20 1641     WBC 6.36 10*3/mm3      RBC 4.99 10*6/mm3      Hemoglobin 13.5 g/dL      Hematocrit 41.4 %      MCV 83.0 fL      MCH 27.1 pg      MCHC 32.6 g/dL      RDW 13.6 %      RDW-SD 41.2 fl      MPV 10.8 fL      Platelets 294 10*3/mm3      Neutrophil % 89.6 %      Lymphocyte % 6.1 %      Monocyte % 3.0 %      Eosinophil % 0.0 %      Basophil % 0.2 %      Immature Grans % 1.1 %      Neutrophils, Absolute 5.70 10*3/mm3      Lymphocytes, Absolute 0.39 10*3/mm3      Monocytes, Absolute 0.19 10*3/mm3      Eosinophils, Absolute 0.00 10*3/mm3      Basophils, Absolute 0.01 10*3/mm3      Immature Grans, Absolute 0.07 10*3/mm3      nRBC 0.0 /100 WBC     Comprehensive Metabolic Panel [[id number]]  (Abnormal) Collected: 12/02/20 1618    Specimen: Blood from Arm, Left Updated: 12/02/20 1707     Glucose 144 mg/dL      BUN 51  "mg/dL      Creatinine 1.52 mg/dL      Sodium 136 mmol/L      Potassium 4.6 mmol/L      Chloride 97 mmol/L      CO2 25.0 mmol/L      Calcium 9.4 mg/dL      Total Protein 7.1 g/dL      Albumin 4.00 g/dL      ALT (SGPT) 18 U/L      AST (SGOT) 37 U/L      Alkaline Phosphatase 110 U/L      Total Bilirubin 0.5 mg/dL      eGFR Non African Amer 35 mL/min/1.73      Globulin 3.1 gm/dL      A/G Ratio 1.3 g/dL      BUN/Creatinine Ratio 33.6     Anion Gap 14.0 mmol/L     Narrative:      GFR Normal >60  Chronic Kidney Disease <60  Kidney Failure <15      Lactate Dehydrogenase [[id number]]  (Abnormal) Collected: 12/02/20 1618    Specimen: Blood from Arm, Left Updated: 12/02/20 1706      U/L     Procalcitonin [[id number]]  (Normal) Collected: 12/02/20 1618    Specimen: Blood from Arm, Left Updated: 12/02/20 1713     Procalcitonin 0.17 ng/mL     Narrative:      As a Marker for Sepsis (Non-Neonates):   1. <0.5 ng/mL represents a low risk of severe sepsis and/or septic shock.  1. >2 ng/mL represents a high risk of severe sepsis and/or septic shock.    As a Marker for Lower Respiratory Tract Infections that require antibiotic therapy:  PCT on Admission     Antibiotic Therapy             6-12 Hrs later  > 0.5                Strongly Recommended            >0.25 - <0.5         Recommended  0.1 - 0.25           Discouraged                   Remeasure/reassess PCT  <0.1                 Strongly Discouraged          Remeasure/reassess PCT      As 28 day mortality risk marker: \"Change in Procalcitonin Result\" (> 80 % or <=80 %) if Day 0 (or Day 1) and Day 4 values are available. Refer to http://www.[employer]rend[employer]-pct-calculator.com/   Change in PCT <=80 %   A decrease of PCT levels below or equal to 80 % defines a positive change in PCT test result representing a higher risk for 28-day all-cause mortality of patients diagnosed with severe sepsis or septic shock.  Change in PCT > 80 %   A decrease of PCT levels of more than 80 % defines a " negative change in PCT result representing a lower risk for 28-day all-cause mortality of patients diagnosed with severe sepsis or septic shock.                Results may be falsely decreased if patient taking Biotin.     C-reactive Protein [[id number]]  (Abnormal) Collected: 12/02/20 1618    Specimen: Blood from Arm, Left Updated: 12/02/20 1706     C-Reactive Protein 11.27 mg/dL     Ferritin [[id number]]  (Abnormal) Collected: 12/02/20 1618    Specimen: Blood from Arm, Left Updated: 12/02/20 1712     Ferritin 1,034.00 ng/mL     Narrative:      Results may be falsely decreased if patient taking Biotin.      Blood Culture - Blood, Arm, Left [[id number]] Collected: 12/02/20 1618    Specimen: Blood from Arm, Left Updated: 12/02/20 1654    Blood Gas, Arterial - [[id number]]  (Abnormal) Collected: 12/02/20 1652    Specimen: Arterial Blood Updated: 12/02/20 1658     Site Left Brachial     Jairo's Test Positive     pH, Arterial 7.434 pH units      pCO2, Arterial 38.3 mm Hg      pO2, Arterial 47.0 mm Hg      Comment: 85 Value below critical limit        HCO3, Arterial 25.6 mmol/L      Base Excess, Arterial 1.4 mmol/L      O2 Saturation, Arterial 84.6 %      Comment: 84 Value below reference range        Temperature 37.0 C      Barometric Pressure for Blood Gas 759 mmHg      Modality Nasal Cannula     Flow Rate 5.0 lpm      Ventilator Mode NA     Notified Who DR THORNE     Notified By [id number]     Notified Time 12/02/2020 16:58     Collected by [id number]     Comment: Meter: [id number]     :  [id number]        pCO2, Temperature Corrected 38.3 mm Hg      pH, Temp Corrected 7.434 pH Units      pO2, Temperature Corrected 47.0 mm Hg     COVID PRE-OP / PRE-PROCEDURE SCREENING ORDER (NO ISOLATION) - Swab, Nasopharynx [[id number]]  (Abnormal) Collected: 12/02/20 1728    Specimen: Swab from Nasopharynx Updated: 12/02/20 1825    Narrative:      The following orders were created for panel order COVID PRE-OP / PRE-PROCEDURE  SCREENING ORDER (NO ISOLATION) - Swab, Nasopharynx.  Procedure                               Abnormality         Status                     ---------                               -----------         ------                     COVID-19, [facility] IN-HOUS...[[id number]]  Abnormal            Final result                 Please view results for these tests on the individual orders.    COVID-19, [facility] IN-HOUSE,NP Swab (NO TRANSPORT MEDIA) 2 HR TAT - Swab, Nasopharynx [[id number]]  (Abnormal) Collected: 12/02/20 1728    Specimen: Swab from Nasopharynx Updated: 12/02/20 1825     COVID19 Detected    Narrative:      Fact sheet for providers: https://www.fda.gov/media/465959/download     Fact sheet for patients: https://www.fda.gov/media/758792/download          XR Chest 1 View   Final Result   1. Hypoventilation.   2. Bilateral infiltrates may be infectious or inflammatory. Pulmonary   edema also possible.   3. Borderline cardiomegaly.           This report was finalized on 12/02/2020 16:37 by Dr. Isiah Benitez MD.          ED Course  ED Course as of Dec 02 1922   Wed Dec 02, 2020   1921 The patient of the results of her test.  Everything is consistent with Covid.  She will require admission for stabilization.  Her pulse ox is better now but still is having work very hard on oxygen to keep it there.  We will admit for further care.    [TR]      ED Course User Index  [TR] Khadar Lyles Jr., MD          MDM  Number of Diagnoses or Management Options  COVID-19: new and requires workup     Amount and/or Complexity of Data Reviewed  Clinical lab tests: ordered and reviewed  Tests in the radiology section of CPT®: ordered and reviewed  Tests in the medicine section of CPT®: ordered and reviewed  Discuss the patient with other providers: yes    Risk of Complications, Morbidity, and/or Mortality  Presenting problems: moderate  Diagnostic procedures: moderate  Management options: moderate    Patient Progress  Patient progress:  stable      Final diagnoses:   COVID-19          Khadar Lyles Jr., MD  12/02/20 1922

## 2020-12-03 PROBLEM — N17.9 AKI (ACUTE KIDNEY INJURY) (HCC): Status: ACTIVE | Noted: 2020-01-01

## 2020-12-03 NOTE — CONSULTS
"Pharmacy Dosing Service  Anticoagulant  Enoxaparin    Assessment/Action/Plan:  Pharmacy to Dose Lovenox request for possible DVT/PE (elevated D-dimer, VQ scan in am). Initiated Lovenox 1 mg/kg SQ every 12 hours. Pharmacy will continue to monitor renal function and adjust dose accordingly.       Subjective:  Radha Smiley is a 55 y.o. female on Enoxaparin 1 mg/kg SQ every 12 hours for indication of  rule out DVT/PE .  Objective:  [Ht: 170.2 cm (67\"); Wt: 111 kg (245 lb); BMI: Body mass index is 38.37 kg/m².]  Estimated Creatinine Clearance: 69 mL/min (A) (by C-G formula based on SCr of 1.17 mg/dL (H)).   Lab Results   Component Value Date    INR 1.06 12/02/2020    PROTIME 13.4 12/02/2020      Lab Results   Component Value Date    HGB 13.5 12/02/2020      Lab Results   Component Value Date     12/02/2020       Wale Valero, PharmD  12/02/20 23:16 CST     "

## 2020-12-03 NOTE — PAYOR COMM NOTE
"Rashad Smiley (55 y.o. Female) [id number]  Admit 12/2  [facility] phone   Fax        Date of Birth Social Security Number Address Home Phone MRN    1965  [id number] [address] [phone] [id number]    Islam Marital Status          Other        Admission Date Admission Type Admitting Provider Attending Provider Department, Room/Bed    12/2/20 Emergency Dav Fowler, Dav Fisher,  [facility] 2C, 283/1    Discharge Date Discharge Disposition Discharge Destination                       Attending Provider: Dav Fowler DO    Allergies: Demerol [Meperidine]    Isolation: Enh Drop/Con   Infection: COVID (confirmed) (12/02/20)   Code Status: CPR    Ht: 170.2 cm (67\")   Wt: 111 kg (245 lb)    Admission Cmt: None   Principal Problem: Pneumonia due to COVID-19 virus [U07.1,J12.89]                 Active Insurance as of 12/2/2020     Primary Coverage     Payor Plan Insurance Group Employer/Plan Group    [email or web address] BLUE SHIELD PPO [id number]     Payor Plan Address Payor Plan Phone Number Payor Plan Fax Number Effective Dates    [address] [phone]  11/1/2020 - None Entered    [facility] [id number]       Subscriber Name Subscriber Birth Date Member ID       RASHAD SMILEY 1965 [id number]                 Emergency Contacts      (Rel.) Home Phone Work Phone Mobile Phone    LETTY HARDY (Spouse) -- -- [phone]    HERFLOR WOLF (Daughter) -- -- [phone]               History & Physical      Gene Lui MD at 12/02/20 2000              [facility] Medicine Services  HISTORY AND PHYSICAL    Date of Admission: 12/2/2020  Primary Care Physician: Adrien De Leon MD    Subjective     Chief Complaint: Shortness of breath    History of Present Illness  Patient is a 55-year-old white female past medical history of " Phone rang and then clicked off, will try again later.    hypertension previous zoster also charted is a history of common variable immunodeficiency (uncertain how that was diagnosed) presents with a 10-day history of cough congestion upper respiratory symptoms.  She was diagnosed with COVID-19 deep Tuesday prior to Thanksgiving approximately 7 days ago she showed symptoms the Friday prior to Thanksgiving.  She has been treated as an outpatient with zinc vitamin C initially apparently prednisone later apparently yesterday she started Decadron per her PCP.  She came in because she reportedly had a pulse ox that she been checking at home in the 50s however here she was in the mid to low 80s.  I suspect the home 1 was an accurate she has acrylic nails.  Her chest x-ray is consistent with COVID-19 her test here is also positive as all her labs related to inflammatory markers elevated.  She works as a  in the [employer] system and that is where she believes she got exposed.  Her  is also suffering from this and is not hypoxic but per her not doing well.  She is being admitted due to acute respiratory failure with hypoxia and Covid pneumonia.  She states she is also had intermittent fevers that are waxing and waning as well as diarrhea intermittently and loss of appetite/change in taste.        Review of Systems   14 point review of systems negative except for as per HPI    Otherwise complete ROS reviewed and negative except as mentioned in the HPI.    Past Medical History:   Past Medical History:   Diagnosis Date   • Cellulitis    • Hypertension      Past Surgical History:  Past Surgical History:   Procedure Laterality Date   • INTRAOCULAR PROSTHESES INSERTION Left      Social History:  reports previous alcohol use.    Family History: family history includes Diabetes in her brother and sister.       Allergies:  No Known Allergies  Medications:  Prior to Admission medications    Not on File     Objective     Vital Signs: /98   Pulse 94   Temp 99.2 °F (37.3  "°C) (Oral)   Resp 18   Ht 170.2 cm (67\")   Wt 111 kg (245 lb)   SpO2 90%   BMI 38.37 kg/m²   Physical Exam   Gen.:  Well-nourished well-developed white female in no acute distress  HEENT: Atraumatic, normocephalic.  Pupil reactive to light on the right extraocular movements intact on the right her left eye is a prosthetic (from previous trauma) sclerae anicteric.  External ears negative.  Mucous membranes moist.  Neck is supple without lymphadenopathy.  No JVD is noted.  No carotid bruits are auscultated.  Oropharynx is without erythema or exudate.   Chest: Clear to auscultation and percussion.  CV: Regular rate and rhythm.  Normal S1-S2.  No gallops, murmurs. or rubs.  Abdomen: Soft, nontender, nondistended.  Active bowel sounds,  No hepatosplenomegaly.  No masses.  No hernias.  Extremities: No clubbing, edema, or cyanosis.  Capillary refill is normal.  Pulses are 2+ and symmetric at radial and dorsalis pedis.  Posterior tibial pulses are intact and 2+ palpable.  Neuro: Patient is awake, alert, and oriented ×4  Cranial nerves II through XII are grossly intact.  Motor is 5 out of 5 bilaterally.  DTRs are 2+ and symmetric bilaterally. Sensory exam is nonfocal  Skin: Warm, dry, and intact.  No evidence of breakdown.  Sensorium: Normal thought and affect    Nursing notes and vital signs reviewed        Results Reviewed:  Lab Results (last 24 hours)     Procedure Component Value Units Date/Time    COVID PRE-OP / PRE-PROCEDURE SCREENING ORDER (NO ISOLATION) - Swab, Nasopharynx [[id number]]  (Abnormal) Collected: 12/02/20 1728    Specimen: Swab from Nasopharynx Updated: 12/02/20 1825    Narrative:      The following orders were created for panel order COVID PRE-OP / PRE-PROCEDURE SCREENING ORDER (NO ISOLATION) - Swab, Nasopharynx.  Procedure                               Abnormality         Status                     ---------                               -----------         ------                     COVID-19, " "ABBOTT IN-HOUS...[[id number]]  Abnormal            Final result                 Please view results for these tests on the individual orders.    COVID-19, ABBOTT IN-HOUSE,NP Swab (NO TRANSPORT MEDIA) 2 HR TAT - Swab, Nasopharynx [[id number]]  (Abnormal) Collected: 12/02/20 1728    Specimen: Swab from Nasopharynx Updated: 12/02/20 1825     COVID19 Detected    Narrative:      Fact sheet for providers: https://www.fda.gov/media/610288/download     Fact sheet for patients: https://www.fda.gov/media/885517/download    Procalcitonin [[id number]]  (Normal) Collected: 12/02/20 1618    Specimen: Blood from Arm, Left Updated: 12/02/20 1713     Procalcitonin 0.17 ng/mL     Narrative:      As a Marker for Sepsis (Non-Neonates):   1. <0.5 ng/mL represents a low risk of severe sepsis and/or septic shock.  1. >2 ng/mL represents a high risk of severe sepsis and/or septic shock.    As a Marker for Lower Respiratory Tract Infections that require antibiotic therapy:  PCT on Admission     Antibiotic Therapy             6-12 Hrs later  > 0.5                Strongly Recommended            >0.25 - <0.5         Recommended  0.1 - 0.25           Discouraged                   Remeasure/reassess PCT  <0.1                 Strongly Discouraged          Remeasure/reassess PCT      As 28 day mortality risk marker: \"Change in Procalcitonin Result\" (> 80 % or <=80 %) if Day 0 (or Day 1) and Day 4 values are available. Refer to http://www.[employer]-pct-calculator.com/   Change in PCT <=80 %   A decrease of PCT levels below or equal to 80 % defines a positive change in PCT test result representing a higher risk for 28-day all-cause mortality of patients diagnosed with severe sepsis or septic shock.  Change in PCT > 80 %   A decrease of PCT levels of more than 80 % defines a negative change in PCT result representing a lower risk for 28-day all-cause mortality of patients diagnosed with severe sepsis or septic shock.                Results may be falsely " decreased if patient taking Biotin.     Ferritin [[id number]]  (Abnormal) Collected: 12/02/20 1618    Specimen: Blood from Arm, Left Updated: 12/02/20 1712     Ferritin 1,034.00 ng/mL     Narrative:      Results may be falsely decreased if patient taking Biotin.      Comprehensive Metabolic Panel [[id number]]  (Abnormal) Collected: 12/02/20 1618    Specimen: Blood from Arm, Left Updated: 12/02/20 1707     Glucose 144 mg/dL      BUN 51 mg/dL      Creatinine 1.52 mg/dL      Sodium 136 mmol/L      Potassium 4.6 mmol/L      Chloride 97 mmol/L      CO2 25.0 mmol/L      Calcium 9.4 mg/dL      Total Protein 7.1 g/dL      Albumin 4.00 g/dL      ALT (SGPT) 18 U/L      AST (SGOT) 37 U/L      Alkaline Phosphatase 110 U/L      Total Bilirubin 0.5 mg/dL      eGFR Non African Amer 35 mL/min/1.73      Globulin 3.1 gm/dL      A/G Ratio 1.3 g/dL      BUN/Creatinine Ratio 33.6     Anion Gap 14.0 mmol/L     Narrative:      GFR Normal >60  Chronic Kidney Disease <60  Kidney Failure <15      C-reactive Protein [[id number]]  (Abnormal) Collected: 12/02/20 1618    Specimen: Blood from Arm, Left Updated: 12/02/20 1706     C-Reactive Protein 11.27 mg/dL     Lactate Dehydrogenase [[id number]]  (Abnormal) Collected: 12/02/20 1618    Specimen: Blood from Arm, Left Updated: 12/02/20 1706      U/L     Lactic Acid, Plasma [[id number]]  (Normal) Collected: 12/02/20 1615    Specimen: Blood from Arm, Left Updated: 12/02/20 1705     Lactate 1.9 mmol/L     Blood Gas, Arterial - [[id number]]  (Abnormal) Collected: 12/02/20 1652    Specimen: Arterial Blood Updated: 12/02/20 1658     Site Left Brachial     Jairo's Test Positive     pH, Arterial 7.434 pH units      pCO2, Arterial 38.3 mm Hg      pO2, Arterial 47.0 mm Hg      Comment: 85 Value below critical limit        HCO3, Arterial 25.6 mmol/L      Base Excess, Arterial 1.4 mmol/L      O2 Saturation, Arterial 84.6 %      Comment: 84 Value below reference range        Temperature 37.0 C      Barometric  Pressure for Blood Gas 759 mmHg      Modality Nasal Cannula     Flow Rate 5.0 lpm      Ventilator Mode NA     Notified Who DR THORNE     Notified By [id number]     Notified Time 12/02/2020 16:58     Collected by [id number]     Comment: Meter: [id number]     :  [id number]        pCO2, Temperature Corrected 38.3 mm Hg      pH, Temp Corrected 7.434 pH Units      pO2, Temperature Corrected 47.0 mm Hg     Blood Culture - Blood, Arm, Left [[id number]] Collected: 12/02/20 1615    Specimen: Blood from Arm, Left Updated: 12/02/20 1654    Blood Culture - Blood, Arm, Left [[id number]] Collected: 12/02/20 1618    Specimen: Blood from Arm, Left Updated: 12/02/20 1654    D-dimer, Quantitative [[id number]]  (Abnormal) Collected: 12/02/20 1615    Specimen: Blood from Arm, Left Updated: 12/02/20 1651     D-Dimer, Quantitative 0.69 mg/L (FEU)     Narrative:      Reference Range is 0-0.50 mg/L FEU. However, results <0.50 mg/L FEU tends to rule out DVT or PE. Results >0.50 mg/L FEU are not useful in predicting absence or presence of DVT or PE.      CBC & Differential [[id number]]  (Abnormal) Collected: 12/02/20 1615    Specimen: Blood from Arm, Left Updated: 12/02/20 1641    Narrative:      The following orders were created for panel order CBC & Differential.  Procedure                               Abnormality         Status                     ---------                               -----------         ------                     CBC Auto Differential[[id number]]        Abnormal            Final result                 Please view results for these tests on the individual orders.    CBC Auto Differential [[id number]]  (Abnormal) Collected: 12/02/20 1615    Specimen: Blood from Arm, Left Updated: 12/02/20 1641     WBC 6.36 10*3/mm3      RBC 4.99 10*6/mm3      Hemoglobin 13.5 g/dL      Hematocrit 41.4 %      MCV 83.0 fL      MCH 27.1 pg      MCHC 32.6 g/dL      RDW 13.6 %      RDW-SD 41.2 fl      MPV 10.8 fL      Platelets 294 10*3/mm3         Neutrophil % 89.6 %      Lymphocyte % 6.1 %      Monocyte % 3.0 %      Eosinophil % 0.0 %      Basophil % 0.2 %      Immature Grans % 1.1 %      Neutrophils, Absolute 5.70 10*3/mm3      Lymphocytes, Absolute 0.39 10*3/mm3      Monocytes, Absolute 0.19 10*3/mm3      Eosinophils, Absolute 0.00 10*3/mm3      Basophils, Absolute 0.01 10*3/mm3      Immature Grans, Absolute 0.07 10*3/mm3      nRBC 0.0 /100 WBC         Imaging Results (Last 24 Hours)     Procedure Component Value Units Date/Time    XR Chest 1 View [[id number]] Collected: 12/02/20 1636     Updated: 12/02/20 1640    Narrative:      EXAMINATION:  XR CHEST 1 VW-  12/2/2020 4:25 PM CST     HISTORY: Shortness of breath.     COMPARISON: 5/30/2012.     FINDINGS:  There is hypoventilation. There are bilateral infiltrates.  There is elevation/eventration of the right hemidiaphragm. There is  borderline cardiomegaly. No significant pleural effusion is seen.       Impression:      1. Hypoventilation.  2. Bilateral infiltrates may be infectious or inflammatory. Pulmonary  edema also possible.  3. Borderline cardiomegaly.        This report was finalized on 12/02/2020 16:37 by Dr. Isiah Benitez MD.        I have personally reviewed and interpreted the radiology studies and ECG obtained at time of admission.     Assessment / Plan     Assessment:   Active Hospital Problems    Diagnosis   • **Pneumonia due to COVID-19 virus   • COVID-19   • Acute respiratory failure with hypoxia (CMS/HCC)   • Benign essential hypertension   1.  Acute respiratory failure with hypoxia due to COVID-19 plans to admit patient supplemental O2 we will add Mucinex for pulmonary toilet MDI inhaler with albuterol.  Will start patient on remdesivir and give convalescent plasma.  Consider infectious disease consultation.  Will check IL-6 level due to how far out she is.  We will add zinc, atorvastatin, vitamin D, vitamin C, Decadron, and melatonin.  2.  COVID-19 pneumonia plans as above.  3.   Hypertension need to get an accurate medicine list and resume home medications as appropriate.  4.  RITA uncertain as to what her baseline renal function is she states she has not been eating or drinking very well for the last few days we will give 500 cc of normal saline x1 recheck labs in a.m.  Hopefully the volume from the remdesivir Anaplasma will help with this.  We will also check BNP level due to cardiomegaly noted on the chest x-ray.  5.  Elevated D-dimers she is borderline with elevation will check VQ scan in the morning will give full dose Lovenox for tonight this can be transitioned to therapeutic prophylactic dosing if VQ scan negative.      Patient seen prior to midnight         Code Status: Full     I discussed the patient's findings and my recommendations with the patient.  She designates her  as her surrogate healthcare decision maker.    Estimated length of stay greater than 2 nights.    Patient seen and examined by me on December 2, 2020 at 8 PM.    Electronically signed by Gene Lui MD, 12/02/20, 21:00 CST.                Electronically signed by Gene Lui MD at 12/02/20 2116          Emergency Department Notes      Khadar Lyles Jr., MD at 12/02/20 1608          Subjective   Patient complains of SOB getting worse over the past few days.  She was diagnosed with Covid about a week ago.  She had had symptoms about a week before that.  She says in the last few days the shortness of breath is got much worse.  She has been on Decadron but it has not helped.  She does started on oxygen at home yesterday but it is continued to worsen so she came here.  She is having a lot of cough but not much production with it.  She is having some sharp pain in her left chest whenever she breathes deeply or coughs.      History provided by:  Patient   used: No    Shortness of Breath  Severity:  Severe  Onset quality:  Gradual  Duration:  1 week  Timing:   Constant  Progression:  Worsening  Chronicity:  New  Context: URI    Context: not animal exposure, not emotional upset, not fumes, not known allergens, not occupational exposure and not smoke exposure    Relieved by:  Nothing  Worsened by:  Nothing  Ineffective treatments:  Oxygen  Associated symptoms: chest pain and cough    Associated symptoms: no abdominal pain, no diaphoresis, no fever, no hemoptysis, no PND, no sore throat, no syncope, no swollen glands and no vomiting    Risk factors: no recent alcohol use, no family hx of DVT, no hx of cancer, no hx of PE/DVT, no oral contraceptive use, no prolonged immobilization and no tobacco use        Review of Systems   Constitutional: Negative.  Negative for diaphoresis and fever.   HENT: Negative.  Negative for sore throat.    Respiratory: Positive for cough and shortness of breath. Negative for hemoptysis.    Cardiovascular: Positive for chest pain. Negative for syncope and PND.   Gastrointestinal: Negative.  Negative for abdominal pain and vomiting.   Genitourinary: Negative.    Musculoskeletal: Negative.    Skin: Negative.    Neurological: Negative.    Psychiatric/Behavioral: Negative.    All other systems reviewed and are negative.      No past medical history on file.    No Known Allergies    No past surgical history on file.    No family history on file.    Social History     Socioeconomic History   • Marital status:      Spouse name: Not on file   • Number of children: Not on file   • Years of education: Not on file   • Highest education level: Not on file       Prior to Admission medications    Not on File       Medications   sodium chloride 0.9 % flush 10 mL (has no administration in time range)   oxyCODONE-acetaminophen (PERCOCET) 7.5-325 MG per tablet 1 tablet (has no administration in time range)   dexamethasone sodium phosphate injection 10 mg (10 mg Intravenous Given 12/2/20 1726)       Vitals:    12/02/20 1900   BP: 142/96   Pulse: 96   Resp:     Temp:    SpO2: (!) 87%         Objective   Physical Exam  Vitals signs and nursing note reviewed.   Constitutional:       Appearance: She is well-developed.   HENT:      Head: Normocephalic and atraumatic.   Neck:      Musculoskeletal: Normal range of motion.   Cardiovascular:      Rate and Rhythm: Regular rhythm. Tachycardia present.   Pulmonary:      Effort: Tachypnea and respiratory distress present.      Breath sounds: Examination of the right-middle field reveals rhonchi. Examination of the left-middle field reveals rhonchi. Rhonchi present.   Abdominal:      General: Bowel sounds are normal.      Palpations: Abdomen is soft.   Musculoskeletal: Normal range of motion.   Skin:     General: Skin is warm and dry.   Neurological:      General: No focal deficit present.      Mental Status: She is alert and oriented to person, place, and time.   Psychiatric:         Mood and Affect: Mood normal.         Behavior: Behavior normal.         Procedures        Lab Results (last 24 hours)     Procedure Component Value Units Date/Time    CBC & Differential [[id number]]  (Abnormal) Collected: 12/02/20 1615    Specimen: Blood from Arm, Left Updated: 12/02/20 1641    Narrative:      The following orders were created for panel order CBC & Differential.  Procedure                               Abnormality         Status                     ---------                               -----------         ------                     CBC Auto Differential[[id number]]        Abnormal            Final result                 Please view results for these tests on the individual orders.    D-dimer, Quantitative [[id number]]  (Abnormal) Collected: 12/02/20 1615    Specimen: Blood from Arm, Left Updated: 12/02/20 1651     D-Dimer, Quantitative 0.69 mg/L (FEU)     Narrative:      Reference Range is 0-0.50 mg/L FEU. However, results <0.50 mg/L FEU tends to rule out DVT or PE. Results >0.50 mg/L FEU are not useful in predicting absence or presence  of DVT or PE.      Blood Culture - Blood, Arm, Left [[id number]] Collected: 12/02/20 1615    Specimen: Blood from Arm, Left Updated: 12/02/20 1654    Lactic Acid, Plasma [[id number]]  (Normal) Collected: 12/02/20 1615    Specimen: Blood from Arm, Left Updated: 12/02/20 1705     Lactate 1.9 mmol/L     CBC Auto Differential [[id number]]  (Abnormal) Collected: 12/02/20 1615    Specimen: Blood from Arm, Left Updated: 12/02/20 1641     WBC 6.36 10*3/mm3      RBC 4.99 10*6/mm3      Hemoglobin 13.5 g/dL      Hematocrit 41.4 %      MCV 83.0 fL      MCH 27.1 pg      MCHC 32.6 g/dL      RDW 13.6 %      RDW-SD 41.2 fl      MPV 10.8 fL      Platelets 294 10*3/mm3      Neutrophil % 89.6 %      Lymphocyte % 6.1 %      Monocyte % 3.0 %      Eosinophil % 0.0 %      Basophil % 0.2 %      Immature Grans % 1.1 %      Neutrophils, Absolute 5.70 10*3/mm3      Lymphocytes, Absolute 0.39 10*3/mm3      Monocytes, Absolute 0.19 10*3/mm3      Eosinophils, Absolute 0.00 10*3/mm3      Basophils, Absolute 0.01 10*3/mm3      Immature Grans, Absolute 0.07 10*3/mm3      nRBC 0.0 /100 WBC     Comprehensive Metabolic Panel [[id number]]  (Abnormal) Collected: 12/02/20 1618    Specimen: Blood from Arm, Left Updated: 12/02/20 1707     Glucose 144 mg/dL      BUN 51 mg/dL      Creatinine 1.52 mg/dL      Sodium 136 mmol/L      Potassium 4.6 mmol/L      Chloride 97 mmol/L      CO2 25.0 mmol/L      Calcium 9.4 mg/dL      Total Protein 7.1 g/dL      Albumin 4.00 g/dL      ALT (SGPT) 18 U/L      AST (SGOT) 37 U/L      Alkaline Phosphatase 110 U/L      Total Bilirubin 0.5 mg/dL      eGFR Non African Amer 35 mL/min/1.73      Globulin 3.1 gm/dL      A/G Ratio 1.3 g/dL      BUN/Creatinine Ratio 33.6     Anion Gap 14.0 mmol/L     Narrative:      GFR Normal >60  Chronic Kidney Disease <60  Kidney Failure <15      Lactate Dehydrogenase [[id number]]  (Abnormal) Collected: 12/02/20 1618    Specimen: Blood from Arm, Left Updated: 12/02/20 1706      U/L      "Procalcitonin [[id number]]  (Normal) Collected: 12/02/20 1618    Specimen: Blood from Arm, Left Updated: 12/02/20 1713     Procalcitonin 0.17 ng/mL     Narrative:      As a Marker for Sepsis (Non-Neonates):   1. <0.5 ng/mL represents a low risk of severe sepsis and/or septic shock.  1. >2 ng/mL represents a high risk of severe sepsis and/or septic shock.    As a Marker for Lower Respiratory Tract Infections that require antibiotic therapy:  PCT on Admission     Antibiotic Therapy             6-12 Hrs later  > 0.5                Strongly Recommended            >0.25 - <0.5         Recommended  0.1 - 0.25           Discouraged                   Remeasure/reassess PCT  <0.1                 Strongly Discouraged          Remeasure/reassess PCT      As 28 day mortality risk marker: \"Change in Procalcitonin Result\" (> 80 % or <=80 %) if Day 0 (or Day 1) and Day 4 values are available. Refer to http://www.[facility]-pct-calculator.com/   Change in PCT <=80 %   A decrease of PCT levels below or equal to 80 % defines a positive change in PCT test result representing a higher risk for 28-day all-cause mortality of patients diagnosed with severe sepsis or septic shock.  Change in PCT > 80 %   A decrease of PCT levels of more than 80 % defines a negative change in PCT result representing a lower risk for 28-day all-cause mortality of patients diagnosed with severe sepsis or septic shock.                Results may be falsely decreased if patient taking Biotin.     C-reactive Protein [[id number]]  (Abnormal) Collected: 12/02/20 1618    Specimen: Blood from Arm, Left Updated: 12/02/20 1706     C-Reactive Protein 11.27 mg/dL     Ferritin [[id number]]  (Abnormal) Collected: 12/02/20 1618    Specimen: Blood from Arm, Left Updated: 12/02/20 1712     Ferritin 1,034.00 ng/mL     Narrative:      Results may be falsely decreased if patient taking Biotin.      Blood Culture - Blood, Arm, Left [[id number]] Collected: 12/02/20 1618    Specimen: " Blood from Arm, Left Updated: 12/02/20 1654    Blood Gas, Arterial - [[id number]]  (Abnormal) Collected: 12/02/20 1652    Specimen: Arterial Blood Updated: 12/02/20 1658     Site Left Brachial     Jairo's Test Positive     pH, Arterial 7.434 pH units      pCO2, Arterial 38.3 mm Hg      pO2, Arterial 47.0 mm Hg      Comment: 85 Value below critical limit        HCO3, Arterial 25.6 mmol/L      Base Excess, Arterial 1.4 mmol/L      O2 Saturation, Arterial 84.6 %      Comment: 84 Value below reference range        Temperature 37.0 C      Barometric Pressure for Blood Gas 759 mmHg      Modality Nasal Cannula     Flow Rate 5.0 lpm      Ventilator Mode NA     Notified Who DR THORNE     Notified By [id number]     Notified Time 12/02/2020 16:58     Collected by [id number]     Comment: Meter: [id number]     :  [id number]        pCO2, Temperature Corrected 38.3 mm Hg      pH, Temp Corrected 7.434 pH Units      pO2, Temperature Corrected 47.0 mm Hg     COVID PRE-OP / PRE-PROCEDURE SCREENING ORDER (NO ISOLATION) - Swab, Nasopharynx [[id number]]  (Abnormal) Collected: 12/02/20 1728    Specimen: Swab from Nasopharynx Updated: 12/02/20 1825    Narrative:      The following orders were created for panel order COVID PRE-OP / PRE-PROCEDURE SCREENING ORDER (NO ISOLATION) - Swab, Nasopharynx.  Procedure                               Abnormality         Status                     ---------                               -----------         ------                     COVID-19, [facility] IN-HOUS...[[id number]]  Abnormal            Final result                 Please view results for these tests on the individual orders.    COVID-19, [facility] IN-HOUSE,NP Swab (NO TRANSPORT MEDIA) 2 HR TAT - Swab, Nasopharynx [[id number]]  (Abnormal) Collected: 12/02/20 1728    Specimen: Swab from Nasopharynx Updated: 12/02/20 1825     COVID19 Detected    Narrative:      Fact sheet for providers: https://www.fda.gov/media/364988/download     Fact sheet for  patients: https://www.[employer].gov/media/596805/download          XR Chest 1 View   Final Result   1. Hypoventilation.   2. Bilateral infiltrates may be infectious or inflammatory. Pulmonary   edema also possible.   3. Borderline cardiomegaly.           This report was finalized on 12/02/2020 16:37 by Dr. Isiah Benitez MD.          ED Course  ED Course as of Dec 02 1922   Wed Dec 02, 2020   1921 The patient of the results of her test.  Everything is consistent with Covid.  She will require admission for stabilization.  Her pulse ox is better now but still is having work very hard on oxygen to keep it there.  We will admit for further care.    [TR]      ED Course User Index  [TR] Khadar Lyles Jr., MD          MDM  Number of Diagnoses or Management Options  COVID-19: new and requires workup     Amount and/or Complexity of Data Reviewed  Clinical lab tests: ordered and reviewed  Tests in the radiology section of CPT®: ordered and reviewed  Tests in the medicine section of CPT®: ordered and reviewed  Discuss the patient with other providers: yes    Risk of Complications, Morbidity, and/or Mortality  Presenting problems: moderate  Diagnostic procedures: moderate  Management options: moderate    Patient Progress  Patient progress: stable      Final diagnoses:   COVID-19          Khadar Lyles Jr., MD  12/02/20 1922      Electronically signed by Khadar Lyles Jr., MD at 12/02/20 1922       Vital Signs (last day)     Date/Time   Temp   Temp src   Pulse   Resp   BP   Patient Position   SpO2    12/03/20 0536   --   --   --   20   --   --   91    12/03/20 0319   97.6 (36.4)   Oral   87   16   128/87   Lying   94    12/03/20 0250   --   --   79   14   --   --   96    12/03/20 0247   --   --   71   16   --   --   (!) 87    12/03/20 0059   98.4 (36.9)   --   83   16   120/85   --   91    12/03/20 0020   98 (36.7)   Oral   93   16   137/87   --   --    12/03/20 0003   98.1 (36.7)   Oral   95   18   154/88   --   --     12/02/20 2345   98.5 (36.9)   Oral   95   20   130/87   --   --    12/02/20 2318   98 (36.7)   Oral   90   18   122/74   Lying   91    12/02/20 2152   97.9 (36.6)   Oral   90   18   120/77   Lying   93    12/02/20 2035   99.2 (37.3)   Oral   94   18   --   --   90    12/02/20 1953   --   --   93   --   --   --   92    12/02/20 1945   --   --   --   --   144/98   --   --    12/02/20 1941   --   --   101   --   --   --   92    12/02/20 1940   --   --   --   --   131/91   --   --    12/02/20 1900   --   --   96   --   142/96   --   (!) 87    12/02/20 1830   --   --   --   --   137/92   --   --    12/02/20 1829   --   --   87   --   --   --   90    12/02/20 1824   --   --   89   --   --   --   91    12/02/20 1816   --   --   --   --   134/93   --   --    12/02/20 1732   --   --   96   --   --   --   93    12/02/20 1723   --   --   107   --   --   --   92    12/02/20 1722   --   --   102   --   --   --   93    12/02/20 1720   --   --   99   --   --   --   91    12/02/20 1715   --   --   --   --   123/92   --   --    12/02/20 1710   --   --   --   --   --   --   93    12/02/20 1700   --   --   --   --   138/96   --   (!) 86    12/02/20 1606   --   --   --   --   (!) 156/101   Sitting   (S) (!) 87    12/02/20 1604   98.2 (36.8)   Oral   107   20   --   --   --                Current Facility-Administered Medications   Medication Dose Route Frequency Provider Last Rate Last Admin   • acetaminophen (TYLENOL) tablet 650 mg  650 mg Oral Q4H PRN eGne Lui MD        Or   • acetaminophen (TYLENOL) 160 MG/5ML solution 650 mg  650 mg Oral Q4H PRN Gene Lui MD        Or   • acetaminophen (TYLENOL) suppository 650 mg  650 mg Rectal Q4H PRN Gene Lui MD       • atorvastatin (LIPITOR) tablet 10 mg  10 mg Oral Nightly Gene Lui MD       • benzonatate (TESSALON) capsule 200 mg  200 mg Oral Q4H PRN Gene Lui MD       • cholecalciferol (VITAMIN D3) tablet 1,000 Units  1,000 Units Oral Daily  Gene Lui MD       • dexamethasone (DECADRON) tablet 6 mg  6 mg Oral Daily With Breakfast Gene Lui MD        Or   • dexamethasone (DECADRON) injection 6 mg  6 mg Intravenous Daily With Breakfast Gene Lui MD       • dextromethorphan polistirex ER (DELSYM) 30 MG/5ML oral suspension 60 mg  10 mL Oral Q12H PRN Gene Lui MD       • enoxaparin (LOVENOX) syringe 110 mg  1 mg/kg Subcutaneous Q12H Gene Lui MD       • famotidine (PEPCID) tablet 20 mg  20 mg Oral BID Gene Lui MD       • HYDROcodone-acetaminophen (NORCO) 5-325 MG per tablet 1 tablet  1 tablet Oral Q6H PRN Gene Lui MD   1 tablet at 12/03/20 0318   • melatonin tablet 3 mg  3 mg Oral Nightly Gene Lui MD   3 mg at 12/02/20 2223   • ondansetron (ZOFRAN) tablet 4 mg  4 mg Oral Q6H PRN Gene Lui MD        Or   • ondansetron (ZOFRAN) injection 4 mg  4 mg Intravenous Q6H PRN Gene Lui MD       • Pharmacy Consult - Remdesivir   Does not apply Continuous PRN Gene Lui MD       • Pharmacy to Dose enoxaparin (LOVENOX)   Does not apply Continuous PRN Gene Lui MD       • [START ON 12/4/2020] remdesivir 100 mg in sodium chloride 0.9 % 250 mL IVPB (powder vial)  100 mg Intravenous Q24H Gene Lui MD       • sodium chloride 0.9 % flush 10 mL  10 mL Intravenous PRN Gene Lui MD       • sodium chloride 0.9 % flush 10 mL  10 mL Intravenous Q12H Gene Lui MD   10 mL at 12/02/20 2223   • sodium chloride 0.9 % flush 10 mL  10 mL Intravenous PRN Gene Lui MD       • technetium sestamibi (CARDIOLITE) injection 1 dose  1 dose Intravenous Once in imaging Dav Fowler, DO       • vitamin C (ASCORBIC ACID) tablet 500 mg  500 mg Oral Daily Gene Lui MD       • zinc sulfate (ZINCATE) capsule 220 mg  220 mg Oral Daily Gene Lui MD           Pt was admitted to us from ED with increased SOA and low O2 sats. Pt was initially on  15L highflow nasal canula/ Pt is now maxed out on Vapotherm with sats in high 80's-low 90's. PRN pain medication and ativan were given during night. [facility] was notified on pt status. Plasma was given along with Remdesevir. Pt very anxious. Safety maintained, will cont to monitor.

## 2020-12-03 NOTE — CONSULTS
INFECTIOUS DISEASES CONSULT NOTE    Patient:  Radha Smiley 55 y.o. female  ROOM # 283/1  YOB: 1965  MRN: [id number]  [facility]:  [id number]  Admit date: 12/2/2020   Admitting Physician: Dav Fowler DO  Primary Care Physician: Adrien De Leon MD  REFERRING PROVIDER: No ref. provider found      REASON FOR CONSULTATION :covid 19, resp failure, recs      HISTORY OF PRESENT ILLNESS: Patient is an obese 55-year-old female who presented to the emergency department yesterday evening with worsening shortness of breath over a few days.  Per emergency department records she had been on Decadron as an outpatient.  She was started on oxygen at home December 1 but continue to worsen.    Patient reportedly was diagnosed with COVID-19 November 24    She reported loss of appetite, change in her taste, has had some intermittent diarrhea.    >>>>spoke with JEAN Merino - patient is maxed out on vapotherm and is moving to ICU     Past Medical History:   Diagnosis Date   • Cellulitis    • Hypertension      Past Surgical History:   Procedure Laterality Date   • INTRAOCULAR PROSTHESES INSERTION Left      Family History   Problem Relation Age of Onset   • Diabetes Sister    • Diabetes Brother      Social History     Socioeconomic History   • Marital status:      Spouse name: Not on file   • Number of children: Not on file   • Years of education: Not on file   • Highest education level: Not on file   Tobacco Use   • Smoking status: Former Smoker   Substance and Sexual Activity   • Alcohol use: Not Currently           Current Meds:     Current Facility-Administered Medications   Medication Dose Route Frequency Provider Last Rate Last Admin   • acetaminophen (TYLENOL) tablet 650 mg  650 mg Oral Q4H PRN Gene Lui MD        Or   • acetaminophen (TYLENOL) 160 MG/5ML solution 650 mg  650 mg Oral Q4H PRN Gene Lui MD        Or   • acetaminophen (TYLENOL) suppository 650 mg  650 mg Rectal Q4H PRN  Gene Lui MD       • atorvastatin (LIPITOR) tablet 10 mg  10 mg Oral Nightly Gene Lui MD       • benzonatate (TESSALON) capsule 200 mg  200 mg Oral Q4H PRN Gene Lui MD       • cholecalciferol (VITAMIN D3) tablet 1,000 Units  1,000 Units Oral Daily Gene Lui MD   1,000 Units at 12/03/20 0918   • dexamethasone (DECADRON) tablet 6 mg  6 mg Oral Daily With Breakfast Gene Lui MD   6 mg at 12/03/20 0918    Or   • dexamethasone (DECADRON) injection 6 mg  6 mg Intravenous Daily With Breakfast Gene Lui MD       • dextromethorphan polistirex ER (DELSYM) 30 MG/5ML oral suspension 60 mg  10 mL Oral Q12H PRN Gene Lui MD       • [START ON 12/4/2020] enoxaparin (LOVENOX) syringe 40 mg  40 mg Subcutaneous Q24H Dav Fowler DO       • famotidine (PEPCID) tablet 20 mg  20 mg Oral BID Gene Lui MD   20 mg at 12/03/20 0918   • ketorolac (TORADOL) injection 15 mg  15 mg Intravenous Q6H PRN Dav Fowler DO       • melatonin tablet 3 mg  3 mg Oral Nightly Gene Lui MD   3 mg at 12/02/20 2223   • ondansetron (ZOFRAN) tablet 4 mg  4 mg Oral Q6H PRN Gene Lui MD        Or   • ondansetron (ZOFRAN) injection 4 mg  4 mg Intravenous Q6H PRN Gene Lui MD       • Pharmacy Consult - Remdesivir   Does not apply Continuous PRN Gene Lui MD       • [START ON 12/4/2020] remdesivir 100 mg in sodium chloride 0.9 % 250 mL IVPB (powder vial)  100 mg Intravenous Q24H Gene Lui MD       • sodium chloride 0.9 % flush 10 mL  10 mL Intravenous PRN Gene Lui MD       • sodium chloride 0.9 % flush 10 mL  10 mL Intravenous Q12H Gene Lui MD   10 mL at 12/03/20 0918   • sodium chloride 0.9 % flush 10 mL  10 mL Intravenous PRN Gene Lui MD       • technetium sestamibi (CARDIOLITE) injection 1 dose  1 dose Intravenous Once in imaging Dav Fowler, DO       • vitamin C (ASCORBIC ACID) tablet 500 mg  500 mg  "Oral Daily Gene Lui MD   500 mg at 20 0917   • zinc sulfate (ZINCATE) capsule 220 mg  220 mg Oral Daily Gene Lui MD   220 mg at 20 0917       Home Meds:  Prior to Admission medications    Medication Sig Start Date End Date Taking? Authorizing Provider   benazepril-hydrochlorthiazide (LOTENSIN HCT) 20-12.5 MG per tablet Take 1 tablet by mouth Daily.   Yes Wing Kitchen MD   cloNIDine (CATAPRES) 0.1 MG tablet Take 0.1 mg by mouth 2 (Two) Times a Day.   Yes Wing Kitchen MD   lisinopril (PRINIVIL,ZESTRIL) 40 MG tablet Take 40 mg by mouth Daily.   Yes Wing Kitchen MD   metoprolol tartrate (LOPRESSOR) 25 MG tablet Take 25 mg by mouth 2 (Two) Times a Day.   Yes Wing Kitchen MD            Allergies   Allergen Reactions   • Demerol [Meperidine] Rash       Review of Systems   Not obtained directly from patient.  Tried to call in her room she did not answer.  She was actually sleeping.      Vital Signs:  /98 (BP Location: Right arm, Patient Position: Lying)   Pulse 116   Temp 98.7 °F (37.1 °C) (Oral)   Resp 24   Ht 170.2 cm (67\")   Wt 111 kg (245 lb)   SpO2 91%   BMI 38.37 kg/m²   Temp (24hrs), Av.4 °F (36.9 °C), Min:97.6 °F (36.4 °C), Max:99.6 °F (37.6 °C)      Physical Exam    General: Patient appears comfortable lying in bed on her right side sleeping.  oxygen saturations were 96% while she was on 40 L at 100% FiO2 of Vapotherm.  Heart rate was 102 and sinus tach on the monitor      Results Review:    I reviewed the patient's new clinical results.    Lab Results:  CBC:   Lab Results   Lab 20  1615 20  0448   WBC 6.36 3.48   HEMOGLOBIN 13.5 11.7*   HEMATOCRIT 41.4 37.1   PLATELETS 294 258       CMP:   Lab Results   Lab 20  1618 204 20  0448   SODIUM 136  --  139   POTASSIUM 4.6  --  4.2   CHLORIDE 97*  --  100   CO2 25.0  --  27.0   BUN 51*  --  42*   CREATININE 1.52* 1.17* 0.77   CALCIUM 9.4  --  9.4   "   BILIRUBIN 0.5 0.5 0.4   ALK PHOS 110 110 95   ALT (SGPT) 18 18 14   AST (SGOT) 37* 36* 31   GLUCOSE 144*  --  132*       Lab Results (last 72 hours)     Procedure Component Value Units Date/Time    Legionella Antigen, Urine - Urine, Urine, Clean Catch [[id number]]  (Normal) Collected: 12/03/20 1447    Specimen: Urine, Clean Catch Updated: 12/03/20 1535     LEGIONELLA ANTIGEN, URINE Negative    S. Pneumo Ag Urine or CSF - Urine, Urine, Clean Catch [[id number]]  (Normal) Collected: 12/03/20 1447    Specimen: Urine, Clean Catch Updated: 12/03/20 1534     Strep Pneumo Ag Negative    Urine Drug Screen - Urine, Clean Catch [[id number]]  (Abnormal) Collected: 12/03/20 1447    Specimen: Urine, Clean Catch Updated: 12/03/20 1528     THC, Screen, Urine Negative     Phencyclidine (PCP), Urine Negative     Cocaine Screen, Urine Negative     Methamphetamine, Ur Negative     Opiate Screen Positive     Amphetamine Screen, Urine Negative     Benzodiazepine Screen, Urine Negative     Tricyclic Antidepressants Screen Negative     Methadone Screen, Urine Negative     Barbiturates Screen, Urine Negative     Oxycodone Screen, Urine Positive     Propoxyphene Screen Negative     Buprenorphine, Screen, Urine Negative    Narrative:      Cutoff For Drugs Screened:    Amphetamines               500 ng/ml  Barbiturates               200 ng/ml  Benzodiazepines            150 ng/ml  Cocaine                    150 ng/ml  Methadone                  200 ng/ml  Opiates                    100 ng/ml  Phencyclidine               25 ng/ml  THC                            50 ng/ml  Methamphetamine            500 ng/ml  Tricyclic Antidepressants  300 ng/ml  Oxycodone                  100 ng/ml  Propoxyphene               300 ng/ml  Buprenorphine               10 ng/ml    The normal value for all drugs tested is negative. This report includes unconfirmed screening results, with the cutoff values listed, to be used for medical treatment purposes only.   Unconfirmed results must not be used for non-medical purposes such as employment or legal testing.  Clinical consideration should be applied to any drug of abuse test, particularly when unconfirmed results are used.      Urinalysis With Microscopic If Indicated (No Culture) - Urine, Clean Catch [[id number]]  (Abnormal) Collected: 12/03/20 1447    Specimen: Urine, Clean Catch Updated: 12/03/20 1522     Color, UA Yellow     Appearance, UA Cloudy     pH, UA 5.5     Specific Gravity, UA 1.021     Glucose, UA Negative     Ketones, UA Trace     Bilirubin, UA Negative     Blood, UA Negative     Protein,  mg/dL (2+)     Leuk Esterase, UA Small (1+)     Nitrite, UA Negative     Urobilinogen, UA 0.2 E.U./dL    Urinalysis, Microscopic Only - Urine, Clean Catch [[id number]]  (Abnormal) Collected: 12/03/20 1447    Specimen: Urine, Clean Catch Updated: 12/03/20 1522     RBC, UA 0-2 /HPF      WBC, UA Too Numerous to Count /HPF      Bacteria, UA 4+ /HPF      Squamous Epithelial Cells, UA 3-6 /HPF      Hyaline Casts, UA 0-2 /LPF      Methodology Automated Microscopy    Respiratory Panel, PCR (WITHOUT COVID) - Swab, Nasopharynx [[id number]]  (Normal) Collected: 12/03/20 1234    Specimen: Swab from Nasopharynx Updated: 12/03/20 1351     ADENOVIRUS, PCR Not Detected     Coronavirus 229E Not Detected     Coronavirus HKU1 Not Detected     Coronavirus NL63 Not Detected     Coronavirus OC43 Not Detected     Human Metapneumovirus Not Detected     Human Rhinovirus/Enterovirus Not Detected     Influenza B PCR Not Detected     Parainfluenza Virus 1 Not Detected     Parainfluenza Virus 2 Not Detected     Parainfluenza Virus 3 Not Detected     Parainfluenza Virus 4 Not Detected     Bordetella pertussis pcr Not Detected     Influenza A H1 2009 PCR Not Detected     Chlamydophila pneumoniae PCR Not Detected     Mycoplasma pneumo by PCR Not Detected     Influenza A PCR Not Detected     Influenza A H3 Not Detected     Influenza A H1 Not Detected        RSV, PCR Not Detected     Bordetella parapertussis PCR Not Detected    Narrative:      The coronavirus on the RVP is NOT COVID-19 and is NOT indicative of infection with COVID-19.     Ferritin [[id number]]  (Abnormal) Collected: 12/03/20 0448    Specimen: Blood Updated: 12/03/20 0606     Ferritin 738.90 ng/mL     Narrative:      Results may be falsely decreased if patient taking Biotin.      Comprehensive Metabolic Panel [[id number]]  (Abnormal) Collected: 12/03/20 0448    Specimen: Blood Updated: 12/03/20 0602     Glucose 132 mg/dL      BUN 42 mg/dL      Creatinine 0.77 mg/dL      Sodium 139 mmol/L      Potassium 4.2 mmol/L      Chloride 100 mmol/L      CO2 27.0 mmol/L      Calcium 9.4 mg/dL      Total Protein 6.8 g/dL      Albumin 3.80 g/dL      ALT (SGPT) 14 U/L      AST (SGOT) 31 U/L      Alkaline Phosphatase 95 U/L      Total Bilirubin 0.4 mg/dL      eGFR Non African Amer 78 mL/min/1.73      Globulin 3.0 gm/dL      A/G Ratio 1.3 g/dL      BUN/Creatinine Ratio 54.5     Anion Gap 12.0 mmol/L     Narrative:      GFR Normal >60  Chronic Kidney Disease <60  Kidney Failure <15      Lactate Dehydrogenase [[id number]]  (Abnormal) Collected: 12/03/20 0448    Specimen: Blood Updated: 12/03/20 0602      U/L     Bilirubin, Direct [[id number]]  (Normal) Collected: 12/03/20 0448    Specimen: Blood Updated: 12/03/20 0601     Bilirubin, Direct 0.2 mg/dL     C-reactive Protein [[id number]]  (Abnormal) Collected: 12/03/20 0448    Specimen: Blood Updated: 12/03/20 0600     C-Reactive Protein 8.96 mg/dL     D-dimer, Quantitative [[id number]]  (Abnormal) Collected: 12/03/20 0448    Specimen: Blood Updated: 12/03/20 0548     D-Dimer, Quantitative 0.52 mg/L (FEU)     Narrative:      Reference Range is 0-0.50 mg/L FEU. However, results <0.50 mg/L FEU tends to rule out DVT or PE. Results >0.50 mg/L FEU are not useful in predicting absence or presence of DVT or PE.      Fibrinogen [[id number]]  (Abnormal) Collected: 12/03/20 4678     Specimen: Blood Updated: 12/03/20 0548     Fibrinogen 595 mg/dL     CBC & Differential [[id number]]  (Abnormal) Collected: 12/03/20 0448    Specimen: Blood Updated: 12/03/20 0541    Narrative:      The following orders were created for panel order CBC & Differential.  Procedure                               Abnormality         Status                     ---------                               -----------         ------                     CBC Auto Differential[[id number]]        Abnormal            Final result                 Please view results for these tests on the individual orders.    CBC Auto Differential [[id number]]  (Abnormal) Collected: 12/03/20 0448    Specimen: Blood Updated: 12/03/20 0541     WBC 3.48 10*3/mm3      RBC 4.43 10*6/mm3      Hemoglobin 11.7 g/dL      Hematocrit 37.1 %      MCV 83.7 fL      MCH 26.4 pg      MCHC 31.5 g/dL      RDW 13.4 %      RDW-SD 41.3 fl      MPV 10.5 fL      Platelets 258 10*3/mm3      Neutrophil % 78.5 %      Lymphocyte % 13.2 %      Monocyte % 7.2 %      Eosinophil % 0.0 %      Basophil % 0.0 %      Immature Grans % 1.1 %      Neutrophils, Absolute 2.73 10*3/mm3      Lymphocytes, Absolute 0.46 10*3/mm3      Monocytes, Absolute 0.25 10*3/mm3      Eosinophils, Absolute 0.00 10*3/mm3      Basophils, Absolute 0.00 10*3/mm3      Immature Grans, Absolute 0.04 10*3/mm3      nRBC 0.0 /100 WBC     Blood Gas, Arterial - [[id number]]  (Abnormal) Collected: 12/03/20 0530    Specimen: Arterial Blood Updated: 12/03/20 0535     Site Right Radial     Jairo's Test Positive     pH, Arterial 7.419 pH units      pCO2, Arterial 43.8 mm Hg      pO2, Arterial 65.5 mm Hg      Comment: 84 Value below reference range        HCO3, Arterial 28.3 mmol/L      Comment: 83 Value above reference range        Base Excess, Arterial 3.3 mmol/L      Comment: 83 Value above reference range        O2 Saturation, Arterial 92.9 %      Comment: 84 Value below reference range        Temperature 37.0 C       Barometric Pressure for Blood Gas 757 mmHg      Modality Heated HFNC     FIO2 100 %      Flow Rate 40.0 lpm      Ventilator Mode NA     Collected by [id number]     Comment: Meter: [id number]     :  [id number]        pCO2, Temperature Corrected 43.8 mm Hg      pH, Temp Corrected 7.419 pH Units      pO2, Temperature Corrected 65.5 mm Hg     CK [[id number]]  (Normal) Collected: 12/02/20 1618    Specimen: Blood from Arm, Left Updated: 12/03/20 0058     Creatine Kinase 74 U/L     Hepatic Function Panel [[id number]]  (Abnormal) Collected: 12/02/20 2204    Specimen: Blood Updated: 12/02/20 2251     Total Protein 7.3 g/dL      Albumin 4.00 g/dL      ALT (SGPT) 18 U/L      AST (SGOT) 36 U/L      Alkaline Phosphatase 110 U/L      Total Bilirubin 0.5 mg/dL      Bilirubin, Direct 0.2 mg/dL      Bilirubin, Indirect 0.3 mg/dL     Creatinine, Serum [[id number]]  (Abnormal) Collected: 12/02/20 2204    Specimen: Blood Updated: 12/02/20 2251     Creatinine 1.17 mg/dL      eGFR Non African Amer 48 mL/min/1.73     Narrative:      GFR Normal >60  Chronic Kidney Disease <60  Kidney Failure <15      Interleukin-6 [[id number]] Collected: 12/02/20 2204    Specimen: Blood Updated: 12/02/20 2222    Hemoglobin A1c [[id number]]  (Normal) Collected: 12/02/20 1615    Specimen: Blood from Arm, Left Updated: 12/02/20 2203     Hemoglobin A1C 5.60 %     Narrative:      Hemoglobin A1C Ranges:    Increased Risk for Diabetes  5.7% to 6.4%  Diabetes                     >= 6.5%  Diabetic Goal                < 7.0%    BNP [[id number]]  (Normal) Collected: 12/02/20 1618    Specimen: Blood from Arm, Left Updated: 12/02/20 2203     proBNP 688.2 pg/mL     Narrative:      Among patients with dyspnea, NT-proBNP is highly sensitive for the detection of acute congestive heart failure. In addition NT-proBNP of <300 pg/ml effectively rules out acute congestive heart failure with 99% negative predictive value.    Results may be falsely decreased if patient taking  Biotin.      Magnesium [[id number]]  (Normal) Collected: 12/02/20 1618    Specimen: Blood from Arm, Left Updated: 12/02/20 2157     Magnesium 1.9 mg/dL     Protime-INR [[id number]]  (Normal) Collected: 12/02/20 1615    Specimen: Blood from Arm, Left Updated: 12/02/20 2125     Protime 13.4 Seconds      INR 1.06    COVID PRE-OP / PRE-PROCEDURE SCREENING ORDER (NO ISOLATION) - Swab, Nasopharynx [[id number]]  (Abnormal) Collected: 12/02/20 1728    Specimen: Swab from Nasopharynx Updated: 12/02/20 1825    Narrative:      The following orders were created for panel order COVID PRE-OP / PRE-PROCEDURE SCREENING ORDER (NO ISOLATION) - Swab, Nasopharynx.  Procedure                               Abnormality         Status                     ---------                               -----------         ------                     COVID-19, [facility]OTT IN-HOUS...[[id number]]  Abnormal            Final result                 Please view results for these tests on the individual orders.    COVID-19, ABBOTT IN-HOUSE,NP Swab (NO TRANSPORT MEDIA) 2 HR TAT - Swab, Nasopharynx [[id number]]  (Abnormal) Collected: 12/02/20 1728    Specimen: Swab from Nasopharynx Updated: 12/02/20 1825     COVID19 Detected    Narrative:      Fact sheet for providers: https://www.fda.gov/media/733820/download     Fact sheet for patients: https://www.fda.gov/media/804737/download    Procalcitonin [[id number]]  (Normal) Collected: 12/02/20 1618    Specimen: Blood from Arm, Left Updated: 12/02/20 1713     Procalcitonin 0.17 ng/mL     Narrative:      As a Marker for Sepsis (Non-Neonates):   1. <0.5 ng/mL represents a low risk of severe sepsis and/or septic shock.  1. >2 ng/mL represents a high risk of severe sepsis and/or septic shock.    As a Marker for Lower Respiratory Tract Infections that require antibiotic therapy:  PCT on Admission     Antibiotic Therapy             6-12 Hrs later  > 0.5                Strongly Recommended            >0.25 - <0.5          "Recommended  0.1 - 0.25           Discouraged                   Remeasure/reassess PCT  <0.1                 Strongly Discouraged          Remeasure/reassess PCT      As 28 day mortality risk marker: \"Change in Procalcitonin Result\" (> 80 % or <=80 %) if Day 0 (or Day 1) and Day 4 values are available. Refer to http://www.[facility]-pct-calculator.com/   Change in PCT <=80 %   A decrease of PCT levels below or equal to 80 % defines a positive change in PCT test result representing a higher risk for 28-day all-cause mortality of patients diagnosed with severe sepsis or septic shock.  Change in PCT > 80 %   A decrease of PCT levels of more than 80 % defines a negative change in PCT result representing a lower risk for 28-day all-cause mortality of patients diagnosed with severe sepsis or septic shock.                Results may be falsely decreased if patient taking Biotin.     Ferritin [[id number]]  (Abnormal) Collected: 12/02/20 1618    Specimen: Blood from Arm, Left Updated: 12/02/20 1712     Ferritin 1,034.00 ng/mL     Narrative:      Results may be falsely decreased if patient taking Biotin.      Comprehensive Metabolic Panel [[id number]]  (Abnormal) Collected: 12/02/20 1618    Specimen: Blood from Arm, Left Updated: 12/02/20 1707     Glucose 144 mg/dL      BUN 51 mg/dL      Creatinine 1.52 mg/dL      Sodium 136 mmol/L      Potassium 4.6 mmol/L      Chloride 97 mmol/L      CO2 25.0 mmol/L      Calcium 9.4 mg/dL      Total Protein 7.1 g/dL      Albumin 4.00 g/dL      ALT (SGPT) 18 U/L      AST (SGOT) 37 U/L      Alkaline Phosphatase 110 U/L      Total Bilirubin 0.5 mg/dL      eGFR Non African Amer 35 mL/min/1.73      Globulin 3.1 gm/dL      A/G Ratio 1.3 g/dL      BUN/Creatinine Ratio 33.6     Anion Gap 14.0 mmol/L     Narrative:      GFR Normal >60  Chronic Kidney Disease <60  Kidney Failure <15      C-reactive Protein [[id number]]  (Abnormal) Collected: 12/02/20 1618    Specimen: Blood from Arm, Left Updated: 12/02/20 " 1706     C-Reactive Protein 11.27 mg/dL     Lactate Dehydrogenase [[id number]]  (Abnormal) Collected: 12/02/20 1618    Specimen: Blood from Arm, Left Updated: 12/02/20 1706      U/L     Lactic Acid, Plasma [[id number]]  (Normal) Collected: 12/02/20 1615    Specimen: Blood from Arm, Left Updated: 12/02/20 1705     Lactate 1.9 mmol/L     Blood Gas, Arterial - [[id number]]  (Abnormal) Collected: 12/02/20 1652    Specimen: Arterial Blood Updated: 12/02/20 1658     Site Left Brachial     Jairo's Test Positive     pH, Arterial 7.434 pH units      pCO2, Arterial 38.3 mm Hg      pO2, Arterial 47.0 mm Hg      Comment: 85 Value below critical limit        HCO3, Arterial 25.6 mmol/L      Base Excess, Arterial 1.4 mmol/L      O2 Saturation, Arterial 84.6 %      Comment: 84 Value below reference range        Temperature 37.0 C      Barometric Pressure for Blood Gas 759 mmHg      Modality Nasal Cannula     Flow Rate 5.0 lpm      Ventilator Mode NA     Notified Who DR THORNE     Notified By [id number]     Notified Time 12/02/2020 16:58     Collected by [id number]     Comment: Meter: [id number]     :  [id number]        pCO2, Temperature Corrected 38.3 mm Hg      pH, Temp Corrected 7.434 pH Units      pO2, Temperature Corrected 47.0 mm Hg     Blood Culture - Blood, Arm, Left [[id number]] Collected: 12/02/20 1615    Specimen: Blood from Arm, Left Updated: 12/02/20 1654    Blood Culture - Blood, Arm, Left [[id number]] Collected: 12/02/20 1618    Specimen: Blood from Arm, Left Updated: 12/02/20 1654    D-dimer, Quantitative [[id number]]  (Abnormal) Collected: 12/02/20 1615    Specimen: Blood from Arm, Left Updated: 12/02/20 1651     D-Dimer, Quantitative 0.69 mg/L (FEU)     Narrative:      Reference Range is 0-0.50 mg/L FEU. However, results <0.50 mg/L FEU tends to rule out DVT or PE. Results >0.50 mg/L FEU are not useful in predicting absence or presence of DVT or PE.      CBC & Differential [[id number]]  (Abnormal) Collected:  12/02/20 1615    Specimen: Blood from Arm, Left Updated: 12/02/20 1641    Narrative:      The following orders were created for panel order CBC & Differential.  Procedure                               Abnormality         Status                     ---------                               -----------         ------                     CBC Auto Differential[[id number]]        Abnormal            Final result                 Please view results for these tests on the individual orders.    CBC Auto Differential [[id number]]  (Abnormal) Collected: 12/02/20 1615    Specimen: Blood from Arm, Left Updated: 12/02/20 1641     WBC 6.36 10*3/mm3      RBC 4.99 10*6/mm3      Hemoglobin 13.5 g/dL      Hematocrit 41.4 %      MCV 83.0 fL      MCH 27.1 pg      MCHC 32.6 g/dL      RDW 13.6 %      RDW-SD 41.2 fl      MPV 10.8 fL      Platelets 294 10*3/mm3      Neutrophil % 89.6 %      Lymphocyte % 6.1 %      Monocyte % 3.0 %      Eosinophil % 0.0 %      Basophil % 0.2 %      Immature Grans % 1.1 %      Neutrophils, Absolute 5.70 10*3/mm3      Lymphocytes, Absolute 0.39 10*3/mm3      Monocytes, Absolute 0.19 10*3/mm3      Eosinophils, Absolute 0.00 10*3/mm3      Basophils, Absolute 0.01 10*3/mm3      Immature Grans, Absolute 0.07 10*3/mm3      nRBC 0.0 /100 WBC           Culture Results:    No results found for: BLOODCX, URINECX, WOUNDCX, MRSACX, RESPCX, STOOLCX      Radiology:   Imaging Results (Last 72 Hours)     Procedure Component Value Units Date/Time    NM Lung Scan Perfusion Particulate [[id number]] Collected: 12/03/20 0852     Updated: 12/03/20 0857    Narrative:      EXAMINATION:  NM LUNG SCAN PERFUSION PARTICULATE-  12/3/2020 8:16 AM CST     HISTORY: Chest pain, acute, PE suspected, intermed prob, negative  D-dimer; U07.1-COVID-19.      COMPARISON: Chest x-ray on 12/2/2020.     TECHNIQUE: The patient was injected with 5.1 mCi of technetium 99m MAA  intravenously. Multiple projection imaging was then performed.     FINDINGS: There  is a single small peripheral perfusion defect in the  right lung base laterally on the posterior image. The patient has an  elevated right hemidiaphragm on x-ray. This is likely related.       Impression:      Low probability of pulmonary embolus.  This report was finalized on 12/03/2020 08:54 by Dr. Isiah Benitez MD.    XR Chest 1 View [[id number]] Collected: 12/02/20 1636     Updated: 12/02/20 1640    Narrative:      EXAMINATION:  XR CHEST 1 VW-  12/2/2020 4:25 PM CST     HISTORY: Shortness of breath.     COMPARISON: 5/30/2012.     FINDINGS:  There is hypoventilation. There are bilateral infiltrates.  There is elevation/eventration of the right hemidiaphragm. There is  borderline cardiomegaly. No significant pleural effusion is seen.       Impression:      1. Hypoventilation.  2. Bilateral infiltrates may be infectious or inflammatory. Pulmonary  edema also possible.  3. Borderline cardiomegaly.        This report was finalized on 12/02/2020 16:37 by Dr. Isiah Benitez MD.            Assessment/Plan       Pneumonia due to COVID-19 virus    COVID-19    Benign essential hypertension    Common variable immunodeficiency (CMS/HCC)    Acute respiratory failure with hypoxia (CMS/HCC)    RITA (acute kidney injury) (CMS/HCC)      IMPRESSION:  Acute kidney injury-improving  Elevated CRP  Elevated ferritin  Elevated D-dimer elevated LDH  Pyuria with few squamous epithelial cells  History of common variable immunodeficiency but no treatment in the last 20 years.      RECOMMENDATION:   · Continue Vapotherm 40 L and 100%  · Agree with transfer to the unit for closer monitoring  · Continue dexamethasone  · Continue remdesivir-Per history day 10 since testing positive.  (Reportedly had symptoms a week prior to that so unlikely to be as beneficial)  · Continue anticoagulation per hospitalist  · Continue adjuvant therapy per hospitalist  · Rapid worsening likely secondary to COVID-19 however will add antibacterial coverage          Tressa Trevino MD  12/03/20  15:48 CST

## 2020-12-03 NOTE — H&P
[facility] Medicine Services  HISTORY AND PHYSICAL    Date of Admission: 12/2/2020  Primary Care Physician: Adrien De Leon MD    Subjective     Chief Complaint: Shortness of breath    History of Present Illness  Patient is a 55-year-old white female past medical history of hypertension previous zoster also charted is a history of common variable immunodeficiency (uncertain how that was diagnosed) presents with a 10-day history of cough congestion upper respiratory symptoms.  She was diagnosed with COVID-19 deep Tuesday prior to Thanksgiving approximately 7 days ago she showed symptoms the Friday prior to Thanksgiving.  She has been treated as an outpatient with zinc vitamin C initially apparently prednisone later apparently yesterday she started Decadron per her PCP.  She came in because she reportedly had a pulse ox that she been checking at home in the 50s however here she was in the mid to low 80s.  I suspect the home 1 was an accurate she has acrylic nails.  Her chest x-ray is consistent with COVID-19 her test here is also positive as all her labs related to inflammatory markers elevated.  She works as a  in the [employer] system and that is where she believes she got exposed.  Her  is also suffering from this and is not hypoxic but per her not doing well.  She is being admitted due to acute respiratory failure with hypoxia and Covid pneumonia.  She states she is also had intermittent fevers that are waxing and waning as well as diarrhea intermittently and loss of appetite/change in taste.        Review of Systems   14 point review of systems negative except for as per HPI    Otherwise complete ROS reviewed and negative except as mentioned in the HPI.    Past Medical History:   Past Medical History:   Diagnosis Date   • Cellulitis    • Hypertension      Past Surgical History:  Past Surgical History:   Procedure Laterality Date   • INTRAOCULAR PROSTHESES  "INSERTION Left      Social History:  reports previous alcohol use.    Family History: family history includes Diabetes in her brother and sister.       Allergies:  No Known Allergies  Medications:  Prior to Admission medications    Not on File     Objective     Vital Signs: /98   Pulse 94   Temp 99.2 °F (37.3 °C) (Oral)   Resp 18   Ht 170.2 cm (67\")   Wt 111 kg (245 lb)   SpO2 90%   BMI 38.37 kg/m²   Physical Exam   Gen.:  Well-nourished well-developed white female in no acute distress  HEENT: Atraumatic, normocephalic.  Pupil reactive to light on the right extraocular movements intact on the right her left eye is a prosthetic (from previous trauma) sclerae anicteric.  External ears negative.  Mucous membranes moist.  Neck is supple without lymphadenopathy.  No JVD is noted.  No carotid bruits are auscultated.  Oropharynx is without erythema or exudate.   Chest: Clear to auscultation and percussion.  CV: Regular rate and rhythm.  Normal S1-S2.  No gallops, murmurs. or rubs.  Abdomen: Soft, nontender, nondistended.  Active bowel sounds,  No hepatosplenomegaly.  No masses.  No hernias.  Extremities: No clubbing, edema, or cyanosis.  Capillary refill is normal.  Pulses are 2+ and symmetric at radial and dorsalis pedis.  Posterior tibial pulses are intact and 2+ palpable.  Neuro: Patient is awake, alert, and oriented ×4  Cranial nerves II through XII are grossly intact.  Motor is 5 out of 5 bilaterally.  DTRs are 2+ and symmetric bilaterally. Sensory exam is nonfocal  Skin: Warm, dry, and intact.  No evidence of breakdown.  Sensorium: Normal thought and affect    Nursing notes and vital signs reviewed        Results Reviewed:  Lab Results (last 24 hours)     Procedure Component Value Units Date/Time    COVID PRE-OP / PRE-PROCEDURE SCREENING ORDER (NO ISOLATION) - Swab, Nasopharynx [[id number]]  (Abnormal) Collected: 12/02/20 1728    Specimen: Swab from Nasopharynx Updated: 12/02/20 1825    Narrative:      " "The following orders were created for panel order COVID PRE-OP / PRE-PROCEDURE SCREENING ORDER (NO ISOLATION) - Swab, Nasopharynx.  Procedure                               Abnormality         Status                     ---------                               -----------         ------                     COVID-19, ABBOTT IN-HOUS...[[id number]]  Abnormal            Final result                 Please view results for these tests on the individual orders.    COVID-19, ABBOTT IN-HOUSE,NP Swab (NO TRANSPORT MEDIA) 2 HR TAT - Swab, Nasopharynx [[id number]]  (Abnormal) Collected: 12/02/20 1728    Specimen: Swab from Nasopharynx Updated: 12/02/20 1825     COVID19 Detected    Narrative:      Fact sheet for providers: https://www.fda.gov/media/681907/download     Fact sheet for patients: https://www.fda.gov/media/355358/download    Procalcitonin [[id number]]  (Normal) Collected: 12/02/20 1618    Specimen: Blood from Arm, Left Updated: 12/02/20 1713     Procalcitonin 0.17 ng/mL     Narrative:      As a Marker for Sepsis (Non-Neonates):   1. <0.5 ng/mL represents a low risk of severe sepsis and/or septic shock.  1. >2 ng/mL represents a high risk of severe sepsis and/or septic shock.    As a Marker for Lower Respiratory Tract Infections that require antibiotic therapy:  PCT on Admission     Antibiotic Therapy             6-12 Hrs later  > 0.5                Strongly Recommended            >0.25 - <0.5         Recommended  0.1 - 0.25           Discouraged                   Remeasure/reassess PCT  <0.1                 Strongly Discouraged          Remeasure/reassess PCT      As 28 day mortality risk marker: \"Change in Procalcitonin Result\" (> 80 % or <=80 %) if Day 0 (or Day 1) and Day 4 values are available. Refer to http://www.[employer]-pct-calculator.com/   Change in PCT <=80 %   A decrease of PCT levels below or equal to 80 % defines a positive change in PCT test result representing a higher risk for 28-day all-cause mortality of " patients diagnosed with severe sepsis or septic shock.  Change in PCT > 80 %   A decrease of PCT levels of more than 80 % defines a negative change in PCT result representing a lower risk for 28-day all-cause mortality of patients diagnosed with severe sepsis or septic shock.                Results may be falsely decreased if patient taking Biotin.     Ferritin [[id number]]  (Abnormal) Collected: 12/02/20 1618    Specimen: Blood from Arm, Left Updated: 12/02/20 1712     Ferritin 1,034.00 ng/mL     Narrative:      Results may be falsely decreased if patient taking Biotin.      Comprehensive Metabolic Panel [[id number]]  (Abnormal) Collected: 12/02/20 1618    Specimen: Blood from Arm, Left Updated: 12/02/20 1707     Glucose 144 mg/dL      BUN 51 mg/dL      Creatinine 1.52 mg/dL      Sodium 136 mmol/L      Potassium 4.6 mmol/L      Chloride 97 mmol/L      CO2 25.0 mmol/L      Calcium 9.4 mg/dL      Total Protein 7.1 g/dL      Albumin 4.00 g/dL      ALT (SGPT) 18 U/L      AST (SGOT) 37 U/L      Alkaline Phosphatase 110 U/L      Total Bilirubin 0.5 mg/dL      eGFR Non African Amer 35 mL/min/1.73      Globulin 3.1 gm/dL      A/G Ratio 1.3 g/dL      BUN/Creatinine Ratio 33.6     Anion Gap 14.0 mmol/L     Narrative:      GFR Normal >60  Chronic Kidney Disease <60  Kidney Failure <15      C-reactive Protein [[id number]]  (Abnormal) Collected: 12/02/20 1618    Specimen: Blood from Arm, Left Updated: 12/02/20 1706     C-Reactive Protein 11.27 mg/dL     Lactate Dehydrogenase [[id number]]  (Abnormal) Collected: 12/02/20 1618    Specimen: Blood from Arm, Left Updated: 12/02/20 1706      U/L     Lactic Acid, Plasma [[id number]]  (Normal) Collected: 12/02/20 1615    Specimen: Blood from Arm, Left Updated: 12/02/20 1705     Lactate 1.9 mmol/L     Blood Gas, Arterial - [[id number]]  (Abnormal) Collected: 12/02/20 1652    Specimen: Arterial Blood Updated: 12/02/20 1658     Site Left Brachial     Jairo's Test Positive     pH, Arterial  7.434 pH units      pCO2, Arterial 38.3 mm Hg      pO2, Arterial 47.0 mm Hg      Comment: 85 Value below critical limit        HCO3, Arterial 25.6 mmol/L      Base Excess, Arterial 1.4 mmol/L      O2 Saturation, Arterial 84.6 %      Comment: 84 Value below reference range        Temperature 37.0 C      Barometric Pressure for Blood Gas 759 mmHg      Modality Nasal Cannula     Flow Rate 5.0 lpm      Ventilator Mode NA     Notified Who DR THORNE     Notified By [id number]     Notified Time 12/02/2020 16:58     Collected by [id number]     Comment: Meter: [id number]     :  [id number]        pCO2, Temperature Corrected 38.3 mm Hg      pH, Temp Corrected 7.434 pH Units      pO2, Temperature Corrected 47.0 mm Hg     Blood Culture - Blood, Arm, Left [[id number]] Collected: 12/02/20 1615    Specimen: Blood from Arm, Left Updated: 12/02/20 1654    Blood Culture - Blood, Arm, Left [[id number]] Collected: 12/02/20 1618    Specimen: Blood from Arm, Left Updated: 12/02/20 1654    D-dimer, Quantitative [[id number]]  (Abnormal) Collected: 12/02/20 1615    Specimen: Blood from Arm, Left Updated: 12/02/20 1651     D-Dimer, Quantitative 0.69 mg/L (FEU)     Narrative:      Reference Range is 0-0.50 mg/L FEU. However, results <0.50 mg/L FEU tends to rule out DVT or PE. Results >0.50 mg/L FEU are not useful in predicting absence or presence of DVT or PE.      CBC & Differential [[id number]]  (Abnormal) Collected: 12/02/20 1615    Specimen: Blood from Arm, Left Updated: 12/02/20 1641    Narrative:      The following orders were created for panel order CBC & Differential.  Procedure                               Abnormality         Status                     ---------                               -----------         ------                     CBC Auto Differential[[id number]]        Abnormal            Final result                 Please view results for these tests on the individual orders.    CBC Auto Differential [[id number]]   (Abnormal) Collected: 12/02/20 1615    Specimen: Blood from Arm, Left Updated: 12/02/20 1641     WBC 6.36 10*3/mm3      RBC 4.99 10*6/mm3      Hemoglobin 13.5 g/dL      Hematocrit 41.4 %      MCV 83.0 fL      MCH 27.1 pg      MCHC 32.6 g/dL      RDW 13.6 %      RDW-SD 41.2 fl      MPV 10.8 fL      Platelets 294 10*3/mm3      Neutrophil % 89.6 %      Lymphocyte % 6.1 %      Monocyte % 3.0 %      Eosinophil % 0.0 %      Basophil % 0.2 %      Immature Grans % 1.1 %      Neutrophils, Absolute 5.70 10*3/mm3      Lymphocytes, Absolute 0.39 10*3/mm3      Monocytes, Absolute 0.19 10*3/mm3      Eosinophils, Absolute 0.00 10*3/mm3      Basophils, Absolute 0.01 10*3/mm3      Immature Grans, Absolute 0.07 10*3/mm3      nRBC 0.0 /100 WBC         Imaging Results (Last 24 Hours)     Procedure Component Value Units Date/Time    XR Chest 1 View [[id number]] Collected: 12/02/20 1636     Updated: 12/02/20 1640    Narrative:      EXAMINATION:  XR CHEST 1 VW-  12/2/2020 4:25 PM CST     HISTORY: Shortness of breath.     COMPARISON: 5/30/2012.     FINDINGS:  There is hypoventilation. There are bilateral infiltrates.  There is elevation/eventration of the right hemidiaphragm. There is  borderline cardiomegaly. No significant pleural effusion is seen.       Impression:      1. Hypoventilation.  2. Bilateral infiltrates may be infectious or inflammatory. Pulmonary  edema also possible.  3. Borderline cardiomegaly.        This report was finalized on 12/02/2020 16:37 by Dr. Isiah Benitez MD.        I have personally reviewed and interpreted the radiology studies and ECG obtained at time of admission.     Assessment / Plan     Assessment:   Active Hospital Problems    Diagnosis   • **Pneumonia due to COVID-19 virus   • COVID-19   • Acute respiratory failure with hypoxia (CMS/HCC)   • Benign essential hypertension   1.  Acute respiratory failure with hypoxia due to COVID-19 plans to admit patient supplemental O2 we will add Mucinex for  pulmonary toilet MDI inhaler with albuterol.  Will start patient on remdesivir and give convalescent plasma.  Consider infectious disease consultation.  Will check IL-6 level due to how far out she is.  We will add zinc, atorvastatin, vitamin D, vitamin C, Decadron, and melatonin.  2.  COVID-19 pneumonia plans as above.  3.  Hypertension need to get an accurate medicine list and resume home medications as appropriate.  4.  RITA uncertain as to what her baseline renal function is she states she has not been eating or drinking very well for the last few days we will give 500 cc of normal saline x1 recheck labs in a.m.  Hopefully the volume from the remdesivir Anaplasma will help with this.  We will also check BNP level due to cardiomegaly noted on the chest x-ray.  5.  Elevated D-dimers she is borderline with elevation will check VQ scan in the morning will give full dose Lovenox for tonight this can be transitioned to therapeutic prophylactic dosing if VQ scan negative.      Patient seen prior to midnight         Code Status: Full     I discussed the patient's findings and my recommendations with the patient.  She designates her  as her surrogate healthcare decision maker.    Estimated length of stay greater than 2 nights.    Patient seen and examined by me on December 2, 2020 at 8 PM.    Electronically signed by Gene Lui MD, 12/02/20, 21:00 CST.

## 2020-12-03 NOTE — PLAN OF CARE
Goal Outcome Evaluation:  Plan of Care Reviewed With: patient  Progress: declining  Outcome Summary: Patient maxed out on Vapotherm with saturations at 86-90%, IV torodal given for pleuritic chest pain, moved to ICU

## 2020-12-03 NOTE — PLAN OF CARE
Goal Outcome Evaluation:  Plan of Care Reviewed With: patient  Progress: no change   Pt was admitted to us from ED with increased SOA and low O2 sats. Pt was initially on 15L highflow nasal canula/ Pt is now maxed out on Vapotherm with sats in high 80's-low 90's. PRN pain medication and ativan were given during night. Sania was notified on pt status. Plasma was given along with Remdesevir. Pt very anxious. Safety maintained, will cont to monitor.

## 2020-12-03 NOTE — PROGRESS NOTES
[facility] Medicine Services  INPATIENT PROGRESS NOTE    Patient Name: Radha Smiley  Date of Admission: 12/2/2020  Today's Date: 12/03/20  Length of Stay: 1  Primary Care Physician: Adrien De Leon MD    Subjective   Chief Complaint: f/u covid 19/respiratory failure    HPI   Patient admitted last evening for COVID-19 viral pneumonia and acute respiratory failure.  She has worsened overnight gone from 5 L O2 on arrival to Vapotherm 40 L high percent FiO2.  When I saw her in the room her sat was anywhere from 91 to 93% but she is having conversational dyspnea and some accessory muscle use.  She says it hurts when she takes deep breaths.  Denies any abdominal pain or nausea/vomiting.  No current dizziness.    Review of Systems   All pertinent negatives and positives are as above. All other systems have been reviewed and are negative unless otherwise stated.     Objective    Temp:  [97.6 °F (36.4 °C)-99.6 °F (37.6 °C)] 99.6 °F (37.6 °C)  Heart Rate:  [] 101  Resp:  [14-24] 24  BP: (120-156)/() 121/85  Physical Exam  GEN: Alert, interactive, mild respiratory distress, conversational dyspnea  HEENT: Anicteric, trachea midline  Lungs: Equal chest rise bilaterally, increased respiratory rate, some accessory muscle use  Heart: Tachycardic, regular rhythm  Abdomen: obese, soft  Extremities: No cyanosis or edema  Neuro: Moving all extremities with no obvious focal deficits  Psych: Appropriate mood and affect    Results Review:  I have reviewed the labs, radiology results, and diagnostic studies.    Laboratory Data:   Results from last 7 days   Lab Units 12/03/20  0448 12/02/20  1615   WBC 10*3/mm3 3.48 6.36   HEMOGLOBIN g/dL 11.7* 13.5   HEMATOCRIT % 37.1 41.4   PLATELETS 10*3/mm3 258 294        Results from last 7 days   Lab Units 12/03/20  0448 12/02/20  2204 12/02/20  1618   SODIUM mmol/L 139  --  136   POTASSIUM mmol/L 4.2  --  4.6   CHLORIDE mmol/L 100  --  97*      CO2 mmol/L 27.0  --  25.0   BUN mg/dL 42*  --  51*   CREATININE mg/dL 0.77 1.17* 1.52*   CALCIUM mg/dL 9.4  --  9.4   BILIRUBIN mg/dL 0.4 0.5 0.5   ALK PHOS U/L 95 110 110   ALT (SGPT) U/L 14 18 18   AST (SGOT) U/L 31 36* 37*   GLUCOSE mg/dL 132*  --  144*       Culture Data:   No results found for: BLOODCX, URINECX, WOUNDCX, MRSACX, RESPCX, STOOLCX    Radiology Data:   Imaging Results (Last 24 Hours)     Procedure Component Value Units Date/Time    NM Lung Scan Perfusion Particulate [[id number]] Collected: 12/03/20 0852     Updated: 12/03/20 0857    Narrative:      EXAMINATION:  NM LUNG SCAN PERFUSION PARTICULATE-  12/3/2020 8:16 AM CST     HISTORY: Chest pain, acute, PE suspected, intermed prob, negative  D-dimer; U07.1-COVID-19.      COMPARISON: Chest x-ray on 12/2/2020.     TECHNIQUE: The patient was injected with 5.1 mCi of technetium 99m MAA  intravenously. Multiple projection imaging was then performed.     FINDINGS: There is a single small peripheral perfusion defect in the  right lung base laterally on the posterior image. The patient has an  elevated right hemidiaphragm on x-ray. This is likely related.       Impression:      Low probability of pulmonary embolus.  This report was finalized on 12/03/2020 08:54 by Dr. Isiah Benitez MD.    XR Chest 1 View [[id number]] Collected: 12/02/20 1636     Updated: 12/02/20 1640    Narrative:      EXAMINATION:  XR CHEST 1 VW-  12/2/2020 4:25 PM CST     HISTORY: Shortness of breath.     COMPARISON: 5/30/2012.     FINDINGS:  There is hypoventilation. There are bilateral infiltrates.  There is elevation/eventration of the right hemidiaphragm. There is  borderline cardiomegaly. No significant pleural effusion is seen.       Impression:      1. Hypoventilation.  2. Bilateral infiltrates may be infectious or inflammatory. Pulmonary  edema also possible.  3. Borderline cardiomegaly.        This report was finalized on 12/02/2020 16:37 by Dr. Isiah Benitez MD.          I  have reviewed the patient's current medications.     Assessment/Plan     Active Hospital Problems    Diagnosis   • **Pneumonia due to COVID-19 virus   • RITA (acute kidney injury) (CMS/HCC)   • COVID-19   • Acute respiratory failure with hypoxia (CMS/HCC)   • Benign essential hypertension   • Common variable immunodeficiency (CMS/HCC)       #1 viral pneumonia due to COVID-19 virus -she has been given plasma.  She is starting on remdesivir.  She is getting Decadron, Pepcid, zinc, statin.  She is worsening overnight pretty rapidly.  We will transfer her to the ICU today for closer monitoring.  Will get infectious disease consult for any other recommendations.    #2 acute respiratory failure hypoxia -in the setting of #1 above.  Supportive care.  Incentive spirometry.  Patient okay for intubation if necessary.  Will recheck another ABG this afternoon to ensure she is not tiring given some distress when I saw her this morning.  VQ scan was low probability for PE.    #3 RITA -resolved with fluids given on arrival.  She had poor p.o. intake.  Encourage p.o. intake.  Renal adjust meds appropriate.    #4 pleuritic chest pain -Toradol as needed.  We will try to avoid opiates given patient's history of addiction in the past.  As above VQ scan low probability for PE.    #5 Common variable immune deficiency -patient has had to have IVIG in the past but states been 20+ years.  Denies chronic problems from this.    #6 history of substance abuse -as above she had been sober for 20+ years per her report.  Avoid narcotics as able.  DC Percocet.    #7 essential hypertension -by history.  Blood pressure stable to mildly elevated right now with no meds being given.  Monitor closely and resume as appropriate.    Discharge Planning: Patient appears ill with rapid decline overnight in the setting of COVID-19.  Will transfer to MICU for closer monitoring.  Potentially could need intubation at some point in the next 24 hours.  Continue  supportive care otherwise.  Will follow ID recommendations.    Electronically signed by Dav Fowler DO, 12/03/20, 09:28 CST.

## 2020-12-04 NOTE — PROGRESS NOTES
[facility] Medicine Services  INPATIENT PROGRESS NOTE    Patient Name: Radha Smiley  Date of Admission: 12/2/2020  Today's Date: 12/04/20  Length of Stay: 2  Primary Care Physician: Adrien De Leon MD    Subjective   Chief Complaint: f/u covid 19/respiratory failure    HPI   Patient is currently sleeping comfortably on heated high flow with additional Ventimask.  Sats are 94%.  She did well overnight per staff.  She does not look in distress at all.  No new fevers or complaints.      ROS:  All pertinent negatives and positives are as above. All other systems have been reviewed and are negative unless otherwise stated.     Objective    Temp:  [96.1 °F (35.6 °C)-99.6 °F (37.6 °C)] 97 °F (36.1 °C)  Heart Rate:  [] 93  Resp:  [14-24] 17  BP: (108-166)/() 132/89  Physical Exam  GEN: Resting comfortable, NAD  HEENT: Anicteric, trachea midline  Lungs: Equal chest rise bilaterally, normal respiratory rate, no accessory muscle use  Heart: RRR  Abdomen: obese, soft  Extremities: No cyanosis or edema  Neuro: Moving all extremities with no obvious focal deficits      Results Review:  I have reviewed the labs, radiology results, and diagnostic studies.    Laboratory Data:   Results from last 7 days   Lab Units 12/04/20  0553 12/03/20  0448 12/02/20  1615   WBC 10*3/mm3 7.29 3.48 6.36   HEMOGLOBIN g/dL 13.1 11.7* 13.5   HEMATOCRIT % 40.9 37.1 41.4   PLATELETS 10*3/mm3 306 258 294        Results from last 7 days   Lab Units 12/04/20  0553 12/03/20  0448 12/02/20  2204 12/02/20  1618   SODIUM mmol/L 140 139  --  136   POTASSIUM mmol/L 4.1 4.2  --  4.6   CHLORIDE mmol/L 101 100  --  97*   CO2 mmol/L 29.0 27.0  --  25.0   BUN mg/dL 40* 42*  --  51*   CREATININE mg/dL 0.66 0.77 1.17* 1.52*   CALCIUM mg/dL 9.7 9.4  --  9.4   BILIRUBIN mg/dL 0.5 0.4 0.5 0.5   ALK PHOS U/L 100 95 110 110   ALT (SGPT) U/L 14 14 18 18   AST (SGOT) U/L 29 31 36* 37*   GLUCOSE mg/dL 115* 132*  --   144*       Culture Data:   No results found for: BLOODCX, URINECX, WOUNDCX, MRSACX, RESPCX, STOOLCX    Radiology Data:   Imaging Results (Last 24 Hours)     Procedure Component Value Units Date/Time    NM Lung Scan Perfusion Particulate [[id number]] Collected: 12/03/20 0852     Updated: 12/03/20 0857    Narrative:      EXAMINATION:  NM LUNG SCAN PERFUSION PARTICULATE-  12/3/2020 8:16 AM CST     HISTORY: Chest pain, acute, PE suspected, intermed prob, negative  D-dimer; U07.1-COVID-19.      COMPARISON: Chest x-ray on 12/2/2020.     TECHNIQUE: The patient was injected with 5.1 mCi of technetium 99m MAA  intravenously. Multiple projection imaging was then performed.     FINDINGS: There is a single small peripheral perfusion defect in the  right lung base laterally on the posterior image. The patient has an  elevated right hemidiaphragm on x-ray. This is likely related.       Impression:      Low probability of pulmonary embolus.  This report was finalized on 12/03/2020 08:54 by Dr. Isiah Benitez MD.          I have reviewed the patient's current medications.     Assessment/Plan     Active Hospital Problems    Diagnosis   • **Pneumonia due to COVID-19 virus   • RITA (acute kidney injury) (CMS/HCC)   • COVID-19   • Acute respiratory failure with hypoxia (CMS/HCC)   • Benign essential hypertension   • Common variable immunodeficiency (CMS/HCC)       #1 viral pneumonia due to COVID-19 virus -status post plasma.  She is on remdesivir.  She is getting Decadron, Pepcid, zinc, statin.  Continue to monitor closely in the ICU.  May need to repeat an ABG later to monitor for any ongoing fatigue.  Complaints require intubation very soon.  Looks comfortable right now.    #2 acute respiratory failure hypoxia -in the setting of #1 above.  VQ scan was low probability for PE.  Also has had some antibiotics added to cover for any potential secondary infection.  As above could require intubation today.     #3 RITA -resolved with fluids  given on arrival.  She had poor p.o. intake.  Encourage p.o. intake.  Renal adjust meds appropriate.    #4 pleuritic chest pain -Toradol as needed.  We will try to avoid opiates given patient's history of addiction in the past.  As above VQ scan low probability for PE.    #5 Common variable immune deficiency -patient has had to have IVIG in the past but states been 20+ years.  Denies chronic problems from this.    #6 history of substance abuse -as above she had been sober for 20+ years per her report.  Avoid narcotics as able.    #7 essential hypertension -be elevated into the 150s last night and her metoprolol and lisinopril were reordered.  Blood pressure slightly soft this morning after meds given.  We will decrease her lisinopril from 40 to 20 daily.  Continue metoprolol.    Discharge Planning: Ongoing.  Monitor closely in the ICU.  Currently guarded and potential for intubation if any worsening.    Electronically signed by Dav Fowler DO, 12/04/20, 08:20 CST.

## 2020-12-04 NOTE — PROGRESS NOTES
INFECTIOUS DISEASES PROGRESS NOTE    Patient:  Radha Smiley  YOB: 1965  MRN: [id number]   Admit date: 12/2/2020   Admitting Physician: Dav Fowler DO  Primary Care Physician: Adrien De Leon MD    Chief Complaint: Not obtained directly from patient        Interval History: Reviewed with JEAN Dorman.  Evaluated patient through glass door and MICU #[id number].    She appeared to be sleeping rather soundly.  JEAN Dorman noted that her oxygenation had been stable.  She has been up to the bedside commode twice.  She does desaturate but recovers very quickly.    We open the door to try to see if we can get the patient to wake up just to voice and she remained asleep.  Initially her saturations were about 86% when I first walked into the unit however they increased to 92% as JEAN Dorman was preparing to go in the room to see if he could reposition her.    Allergies:   Allergies   Allergen Reactions   • Demerol [Meperidine] Rash       Current Meds:     Current Facility-Administered Medications:   •  acetaminophen (TYLENOL) tablet 650 mg, 650 mg, Oral, Q4H PRN **OR** acetaminophen (TYLENOL) 160 MG/5ML solution 650 mg, 650 mg, Oral, Q4H PRN **OR** acetaminophen (TYLENOL) suppository 650 mg, 650 mg, Rectal, Q4H PRN, Gene Lui MD  •  atorvastatin (LIPITOR) tablet 10 mg, 10 mg, Oral, Nightly, Gene Lui MD, 10 mg at 12/03/20 2020  •  AZITHROMYCIN 500 MG/250 ML 0.9% NS IVPB (vial-mate), 500 mg, Intravenous, Q24H, Tressa Trevino MD, 500 mg at 12/03/20 1959  •  benzonatate (TESSALON) capsule 200 mg, 200 mg, Oral, Q4H PRN, Gene Lui MD, 200 mg at 12/03/20 2217  •  cefTRIAXone (ROCEPHIN) 1 g/100 mL 0.9% NS (MBP), 1 g, Intravenous, Q24H, Tressa Trevino MD, 1 g at 12/03/20 1959  •  cholecalciferol (VITAMIN D3) tablet 1,000 Units, 1,000 Units, Oral, Daily, Gene Lui MD, 1,000 Units at 12/04/20 1003  •  dexamethasone (DECADRON) tablet 6 mg, 6 mg, Oral, Daily With  Breakfast, 6 mg at 12/04/20 1002 **OR** dexamethasone (DECADRON) injection 6 mg, 6 mg, Intravenous, Daily With Breakfast, Gene Lui MD  •  dextromethorphan polistirex ER (DELSYM) 30 MG/5ML oral suspension 60 mg, 10 mL, Oral, Q12H PRN, Gene Lui MD  •  enoxaparin (LOVENOX) syringe 40 mg, 40 mg, Subcutaneous, Q24H, Dav Fowler DO, 40 mg at 12/04/20 1210  •  famotidine (PEPCID) tablet 20 mg, 20 mg, Oral, BID, Gene Lui MD, 20 mg at 12/04/20 1003  •  ketorolac (TORADOL) injection 15 mg, 15 mg, Intravenous, Q6H PRN, Dav Fowler DO, 15 mg at 12/04/20 1216  •  [START ON 12/5/2020] lisinopril (PRINIVIL,ZESTRIL) tablet 20 mg, 20 mg, Oral, Daily, Dav Fowler DO  •  melatonin tablet 6 mg, 6 mg, Oral, Nightly, Dav Fowler DO, 6 mg at 12/03/20 2000  •  metoprolol tartrate (LOPRESSOR) tablet 25 mg, 25 mg, Oral, Q12H, Dav Fowler DO, 25 mg at 12/04/20 1003  •  ondansetron (ZOFRAN) tablet 4 mg, 4 mg, Oral, Q6H PRN **OR** ondansetron (ZOFRAN) injection 4 mg, 4 mg, Intravenous, Q6H PRN, Gene Lui MD  •  Pharmacy Consult - Remdesivir, , Does not apply, Continuous PRN, Gene Lui MD  •  [COMPLETED] remdesivir 200 mg in sodium chloride 0.9 % 250 mL IVPB (powder vial), 200 mg, Intravenous, Q24H, 200 mg at 12/03/20 0048 **FOLLOWED BY** remdesivir 100 mg in sodium chloride 0.9 % 250 mL IVPB (powder vial), 100 mg, Intravenous, Q24H, Gene Lui MD, 100 mg at 12/04/20 0029  •  [COMPLETED] Insert peripheral IV, , , Once **AND** sodium chloride 0.9 % flush 10 mL, 10 mL, Intravenous, PRN, Gene Lui MD  •  sodium chloride 0.9 % flush 10 mL, 10 mL, Intravenous, Q12H, Gene Lui MD, 10 mL at 12/04/20 1218  •  sodium chloride 0.9 % flush 10 mL, 10 mL, Intravenous, PRN, Gene Lui MD  •  technetium sestamibi (CARDIOLITE) injection 1 dose, 1 dose, Intravenous, Once in imaging, Dav Fowler F, DO  •  vitamin C (ASCORBIC ACID) tablet 500 mg, 500  "mg, Oral, Daily, Gene Lui MD, 500 mg at 20 1003  •  zinc sulfate (ZINCATE) capsule 220 mg, 220 mg, Oral, Daily, Gene Lui MD, 220 mg at 20 1002      Review of Systems   Unable to perform ROS: Acuity of condition       Objective     Vital Signs:  Temp (24hrs), Av.7 °F (35.9 °C), Min:95.2 °F (35.1 °C), Max:97.7 °F (36.5 °C)      /85   Pulse 96   Temp 95.2 °F (35.1 °C) (Axillary)   Resp 18   Ht 170.2 cm (67\")   Wt 109 kg (240 lb 6.4 oz)   SpO2 90%   BMI 37.65 kg/m²         Physical Exam     General: The patient is an obese female lying in bed appearing comfortable but somewhat low in the bed  HEENT: Vapotherm in place.  Nonrebreather mask in place  Respiratory: Effort appeared even and unlabored  Abdomen: Appears obese  Neuro: Patient has been alert, able to ambulate to the bedside toilet with assistance from nursing  Psych: Patient has been cooperative per nursing report      Results Review:    I reviewed the patient's new clinical results.    Lab Results:  CBC:   Lab Results   Lab 20  1615 20  0448 20  0553   WBC 6.36 3.48 7.29   HEMOGLOBIN 13.5 11.7* 13.1   HEMATOCRIT 41.4 37.1 40.9   PLATELETS 294 258 306         CMP:   Lab Results   Lab 20  1618 20  2204 20  0448 20  0553   SODIUM 136  --  139 140   POTASSIUM 4.6  --  4.2 4.1   CHLORIDE 97*  --  100 101   CO2 25.0  --  27.0 29.0   BUN 51*  --  42* 40*   CREATININE 1.52* 1.17* 0.77 0.66   CALCIUM 9.4  --  9.4 9.7   BILIRUBIN 0.5 0.5 0.4 0.5   ALK PHOS 110 110 95 100   ALT (SGPT) 18 18 14 14   AST (SGOT) 37* 36* 31 29   GLUCOSE 144*  --  132* 115*         Culture Results:    Blood Culture   Date Value Ref Range Status   2020 No growth at 24 hours  Preliminary   2020 No growth at 24 hours  Preliminary       Microbiology Results Abnormal     Procedure Component Value - Date/Time    Blood Culture - Blood, Arm, Left [[id number]] Collected: 20 1615    Lab Status: " Preliminary result Specimen: Blood from Arm, Left Updated: 12/03/20 1700     Blood Culture No growth at 24 hours    Blood Culture - Blood, Arm, Left [[id number]] Collected: 12/02/20 1618    Lab Status: Preliminary result Specimen: Blood from Arm, Left Updated: 12/03/20 1700     Blood Culture No growth at 24 hours    Legionella Antigen, Urine - Urine, Urine, Clean Catch [[id number]]  (Normal) Collected: 12/03/20 1447    Lab Status: Final result Specimen: Urine, Clean Catch Updated: 12/03/20 1535     LEGIONELLA ANTIGEN, URINE Negative    S. Pneumo Ag Urine or CSF - Urine, Urine, Clean Catch [[id number]]  (Normal) Collected: 12/03/20 1447    Lab Status: Final result Specimen: Urine, Clean Catch Updated: 12/03/20 1534     Strep Pneumo Ag Negative    Respiratory Panel, PCR (WITHOUT COVID) - Swab, Nasopharynx [[id number]]  (Normal) Collected: 12/03/20 1234    Lab Status: Final result Specimen: Swab from Nasopharynx Updated: 12/03/20 1351     ADENOVIRUS, PCR Not Detected     Coronavirus 229E Not Detected     Coronavirus HKU1 Not Detected     Coronavirus NL63 Not Detected     Coronavirus OC43 Not Detected     Human Metapneumovirus Not Detected     Human Rhinovirus/Enterovirus Not Detected     Influenza B PCR Not Detected     Parainfluenza Virus 1 Not Detected     Parainfluenza Virus 2 Not Detected     Parainfluenza Virus 3 Not Detected     Parainfluenza Virus 4 Not Detected     Bordetella pertussis pcr Not Detected     Influenza A H1 2009 PCR Not Detected     Chlamydophila pneumoniae PCR Not Detected     Mycoplasma pneumo by PCR Not Detected     Influenza A PCR Not Detected     Influenza A H3 Not Detected     Influenza A H1 Not Detected     RSV, PCR Not Detected     Bordetella parapertussis PCR Not Detected    Narrative:      The coronavirus on the RVP is NOT COVID-19 and is NOT indicative of infection with COVID-19.     COVID PRE-OP / PRE-PROCEDURE SCREENING ORDER (NO ISOLATION) - Swab, Nasopharynx [[id number]]  (Abnormal)  Collected: 12/02/20 1728    Lab Status: Final result Specimen: Swab from Nasopharynx Updated: 12/02/20 1825    Narrative:      The following orders were created for panel order COVID PRE-OP / PRE-PROCEDURE SCREENING ORDER (NO ISOLATION) - Swab, Nasopharynx.  Procedure                               Abnormality         Status                     ---------                               -----------         ------                     COVID-19, ABBOTT IN-HOUS...[[id number]]  Abnormal            Final result                 Please view results for these tests on the individual orders.    COVID-19, ABBOTT IN-HOUSE,NP Swab (NO TRANSPORT MEDIA) 2 HR TAT - Swab, Nasopharynx [[id number]]  (Abnormal) Collected: 12/02/20 1728    Lab Status: Final result Specimen: Swab from Nasopharynx Updated: 12/02/20 1825     COVID19 Detected    Narrative:      Fact sheet for providers: https://www.fda.gov/media/675033/download     Fact sheet for patients: https://www.fda.gov/media/494306/download                Radiology:   Imaging Results (Last 72 Hours)     Procedure Component Value Units Date/Time    NM Lung Scan Perfusion Particulate [[id number]] Collected: 12/03/20 0852     Updated: 12/03/20 0857    Narrative:      EXAMINATION:  NM LUNG SCAN PERFUSION PARTICULATE-  12/3/2020 8:16 AM CST     HISTORY: Chest pain, acute, PE suspected, intermed prob, negative  D-dimer; U07.1-COVID-19.      COMPARISON: Chest x-ray on 12/2/2020.     TECHNIQUE: The patient was injected with 5.1 mCi of technetium 99m MAA  intravenously. Multiple projection imaging was then performed.     FINDINGS: There is a single small peripheral perfusion defect in the  right lung base laterally on the posterior image. The patient has an  elevated right hemidiaphragm on x-ray. This is likely related.       Impression:      Low probability of pulmonary embolus.  This report was finalized on 12/03/2020 08:54 by Dr. Isiah Benitez MD.    XR Chest 1 View [[id number]] Collected:  12/02/20 1636     Updated: 12/02/20 1640    Narrative:      EXAMINATION:  XR CHEST 1 VW-  12/2/2020 4:25 PM CST     HISTORY: Shortness of breath.     COMPARISON: 5/30/2012.     FINDINGS:  There is hypoventilation. There are bilateral infiltrates.  There is elevation/eventration of the right hemidiaphragm. There is  borderline cardiomegaly. No significant pleural effusion is seen.       Impression:      1. Hypoventilation.  2. Bilateral infiltrates may be infectious or inflammatory. Pulmonary  edema also possible.  3. Borderline cardiomegaly.        This report was finalized on 12/02/2020 16:37 by Dr. Isiah Benitez MD.          Assessment/Plan     Active Hospital Problems    Diagnosis   • **Pneumonia due to COVID-19 virus   • RITA (acute kidney injury) (CMS/HCC)   • COVID-19   • Acute respiratory failure with hypoxia (CMS/HCC)   • Benign essential hypertension   • Common variable immunodeficiency (CMS/HCC)       IMPRESSION:  COVID-19 infection with pneumonia and acute respiratory failure-status post convalescent plasma.  On remdesivir.  Acute kidney injury-resolved  Elevated CRP  Elevated ferritin  Elevated D-dimer elevated LDH  Pyuria with few squamous epithelial cells-urine culture in progress.  History of common variable immunodeficiency but no treatment in the last 20 years    RECOMMENDATION:   · Continue empiric ceftriaxone and azithromycin for coverage of community acquired pneumonia  · Continue ceftriaxone for urinary tract infection pending urine culture results  · Continue dexamethasone continue remdesivir  · Continue anticoagulation per hospitalist  · Continue adjuvant therapy per hospitalist  · Continue maximal oxygen support.  · Encourage frequent change of positions and proning if at all possible.    Discussed with JEAN Dorman MD  12/04/20  15:35 CST

## 2020-12-05 NOTE — PLAN OF CARE
Goal Outcome Evaluation:  Plan of Care Reviewed With: patient  No major changes today. Pt remains maxed on vapotherm w non rebreather over it.  Pt up to bsc x 2 today. Pt desats with activity but comes back up quickly  monitor closely for changes.

## 2020-12-05 NOTE — PROGRESS NOTES
[facility] Medicine Services  INPATIENT PROGRESS NOTE    Patient Name: Radha Smiley  Date of Admission: 12/2/2020  Today's Date: 12/05/20  Length of Stay: 3  Primary Care Physician: Adrien De Leon MD    Subjective   Chief Complaint: f/u covid 19/respiratory failure    HPI:  Patient's main complaint is anxiety and dyspnea on exertion.  She is the satting in the mid to recover when she gets up to bedside commode.  Overall she has improved slightly overnight per staff.  She is now off of mask oxygen in combination with Vapotherm and is on Vapotherm.  Sats 94%.  Afebrile overnight and no other events noted.      ROS:  All pertinent negatives and positives are as above. All other systems have been reviewed and are negative unless otherwise stated.     Objective    Temp:  [95.2 °F (35.1 °C)-97.9 °F (36.6 °C)] 97.9 °F (36.6 °C)  Heart Rate:  [] 104  Resp:  [16-20] 19  BP: (106-163)/() 157/85  Physical Exam  GEN: Resting comfortable, NAD  HEENT: Anicteric, trachea midline  Lungs: Equal chest rise bilaterally, normal respiratory rate, no accessory muscle use  Heart: RRR  Abdomen: obese, soft  Extremities: No cyanosis or edema  Neuro: Moving all extremities with no obvious focal deficits      Results Review:  I have reviewed the labs, radiology results, and diagnostic studies.    Laboratory Data:   Results from last 7 days   Lab Units 12/04/20  0553 12/03/20 0448 12/02/20  1615   WBC 10*3/mm3 7.29 3.48 6.36   HEMOGLOBIN g/dL 13.1 11.7* 13.5   HEMATOCRIT % 40.9 37.1 41.4   PLATELETS 10*3/mm3 306 258 294        Results from last 7 days   Lab Units 12/05/20  0223 12/04/20  0553 12/03/20  0448   SODIUM mmol/L 142 140 139   POTASSIUM mmol/L 3.9 4.1 4.2   CHLORIDE mmol/L 104 101 100   CO2 mmol/L 28.0 29.0 27.0   BUN mg/dL 42* 40* 42*   CREATININE mg/dL 0.58 0.66 0.77   CALCIUM mg/dL 9.4 9.7 9.4   BILIRUBIN mg/dL 0.4 0.5 0.4   ALK PHOS U/L 94 100 95   ALT (SGPT) U/L 12 14  14   AST (SGOT) U/L 22 29 31   GLUCOSE mg/dL 121* 115* 132*       Culture Data:   No results found for: BLOODCX, URINECX, WOUNDCX, MRSACX, RESPCX, STOOLCX    Radiology Data:   Imaging Results (Last 24 Hours)     ** No results found for the last 24 hours. **          I have reviewed the patient's current medications.     Assessment/Plan     Active Hospital Problems    Diagnosis   • **Pneumonia due to COVID-19 virus   • RITA (acute kidney injury) (CMS/HCC)   • COVID-19   • Acute respiratory failure with hypoxia (CMS/[facility])   • Benign essential hypertension   • Common variable immunodeficiency (CMS/[facility])       #1 viral pneumonia due to COVID-19 virus -status post plasma.  She is on remdesivir.  She is getting Decadron, Pepcid, zinc, statin.  Continue to monitor closely in the ICU.  Monitor for any potential the intubation needs..    #2 acute respiratory failure hypoxia -in the setting of #1 above.  VQ scan was low probability for PE.  Also has had some antibiotics added to cover for any potential secondary infection.  As above currently borderline and trying to keep her off of ventilator.    #3 RITA -resolved with fluids given on arrival.  She had poor p.o. intake.  Encourage p.o. intake.  Renal adjust meds appropriate.    #4 pleuritic chest pain -Toradol as needed.  We will try to avoid opiates given patient's history of addiction in the past.  As above VQ scan low probability for PE.    #5 Common variable immune deficiency -patient has had to have IVIG in the past but states been 20+ years.  Denies chronic problems from this.    #6 history of substance abuse -as above she had been sober for 20+ years per her report.  Avoid narcotics as able.    #7 essential hypertension -continue metoprolol and lisinopril.  Monitor closely may increase lisinopril tomorrow.    Discharge Planning: Ongoing.  Monitor closely in the ICU.  Currently guarded and potential for intubation if any worsening.    Electronically signed by Dav MATTSON  DO Janeth, 12/05/20, 07:55 CST.

## 2020-12-05 NOTE — PROGRESS NOTES
"INFECTIOUS DISEASES PROGRESS NOTE    Patient:  Radha Smiley  YOB: 1965  MRN: [id number]   Admit date: 12/2/2020   Admitting Physician: Dav Fowler DO  Primary Care Physician: Adrien De Leon MD    Chief Complaint: \"Just hurting in chest\" from coughing        Interval History: Rosa, RN at bedside to give the patient Toradol.  She reports her chest is hurting from coughing..  \"Symptomatic for over 2 weeks\"    Friday before thanksgiving  - started with nasal congestion, breathing heavy, fever and diarrhea.  She contacted Dr. De Leon.  She said he told her to call back on Monday if she was not better.    Tested for Covid November 24, tues. before thanksgiving.  Started on 20 mg prednisone daily per Dr De Leon  She reports she was given an antibiotic but told to start it later.  Then started abx - last Wednesday - [facility]     proned herself last night and it helped per nursing-on Vapotherm now without nonrebreather    Allergies:   Allergies   Allergen Reactions   • Demerol [Meperidine] Rash       Current Meds:     Current Facility-Administered Medications:   •  acetaminophen (TYLENOL) tablet 650 mg, 650 mg, Oral, Q4H PRN, 650 mg at 12/05/20 1320 **OR** acetaminophen (TYLENOL) 160 MG/5ML solution 650 mg, 650 mg, Oral, Q4H PRN **OR** acetaminophen (TYLENOL) suppository 650 mg, 650 mg, Rectal, Q4H PRN, Gene Lui MD  •  atorvastatin (LIPITOR) tablet 10 mg, 10 mg, Oral, Nightly, Gene Lui MD, 10 mg at 12/04/20 2041  •  AZITHROMYCIN 500 MG/250 ML 0.9% NS IVPB (vial-mate), 500 mg, Intravenous, Q24H, Tressa Trevino MD, 500 mg at 12/04/20 1829  •  benzonatate (TESSALON) capsule 200 mg, 200 mg, Oral, Q4H PRN, Gene Lui MD, 200 mg at 12/03/20 2217  •  cefTRIAXone (ROCEPHIN) 1 g/100 mL 0.9% NS (MBP), 1 g, Intravenous, Q24H, Tressa Trevino MD, 1 g at 12/04/20 1736  •  cholecalciferol (VITAMIN D3) tablet 1,000 Units, 1,000 Units, Oral, Daily, Gene Lui, " MD, 1,000 Units at 12/05/20 0801  •  dexamethasone (DECADRON) tablet 6 mg, 6 mg, Oral, Daily With Breakfast, 6 mg at 12/05/20 0753 **OR** dexamethasone (DECADRON) injection 6 mg, 6 mg, Intravenous, Daily With Breakfast, Gene Lui MD  •  dextromethorphan polistirex ER (DELSYM) 30 MG/5ML oral suspension 60 mg, 10 mL, Oral, Q12H PRN, Gene Lui MD  •  enoxaparin (LOVENOX) syringe 40 mg, 40 mg, Subcutaneous, Q24H, Dav Fowler DO, 40 mg at 12/05/20 0801  •  famotidine (PEPCID) tablet 20 mg, 20 mg, Oral, BID, Gene Lui MD, 20 mg at 12/05/20 0801  •  ketorolac (TORADOL) injection 15 mg, 15 mg, Intravenous, Q6H PRN, Dav Fowler DO, 15 mg at 12/05/20 0751  •  lisinopril (PRINIVIL,ZESTRIL) tablet 20 mg, 20 mg, Oral, Daily, Dav Fowler DO, 20 mg at 12/05/20 0801  •  melatonin tablet 6 mg, 6 mg, Oral, Nightly, Dav Fowler DO, 6 mg at 12/04/20 2041  •  metoprolol tartrate (LOPRESSOR) tablet 25 mg, 25 mg, Oral, Q12H, Dav Fowler DO, 25 mg at 12/05/20 0801  •  ondansetron (ZOFRAN) tablet 4 mg, 4 mg, Oral, Q6H PRN **OR** ondansetron (ZOFRAN) injection 4 mg, 4 mg, Intravenous, Q6H PRN, Gene Lui MD  •  Pharmacy Consult - Remdesivir, , Does not apply, Continuous PRN, Gene Lui MD  •  [COMPLETED] remdesivir 200 mg in sodium chloride 0.9 % 250 mL IVPB (powder vial), 200 mg, Intravenous, Q24H, 200 mg at 12/03/20 0048 **FOLLOWED BY** remdesivir 100 mg in sodium chloride 0.9 % 250 mL IVPB (powder vial), 100 mg, Intravenous, Q24H, Gene Lui MD, 100 mg at 12/04/20 2300  •  [COMPLETED] Insert peripheral IV, , , Once **AND** sodium chloride 0.9 % flush 10 mL, 10 mL, Intravenous, PRN, Gene Lui MD  •  sodium chloride 0.9 % flush 10 mL, 10 mL, Intravenous, Q12H, Gene Lui MD, 10 mL at 12/05/20 0801  •  sodium chloride 0.9 % flush 10 mL, 10 mL, Intravenous, PRN, Gene Lui MD  •  technetium sestamibi (CARDIOLITE) injection 1 dose, 1 dose,  "Intravenous, Once in imaging, JanethDav haji F, DO  •  vitamin C (ASCORBIC ACID) tablet 500 mg, 500 mg, Oral, Daily, Gene uLi MD, 500 mg at 20 0801  •  zinc sulfate (ZINCATE) capsule 220 mg, 220 mg, Oral, Daily, Gene Lui MD, 220 mg at 20 0801      Review of Systems   Unable to perform ROS: Acuity of condition       Objective     Vital Signs:  Temp (24hrs), Av.7 °F (36.5 °C), Min:97.5 °F (36.4 °C), Max:97.9 °F (36.6 °C)      /85   Pulse 104   Temp 97.9 °F (36.6 °C) (Axillary)   Resp 19   Ht 170.2 cm (67\")   Wt 109 kg (240 lb 6.4 oz)   SpO2 (!) 87%   BMI 37.65 kg/m²         Physical Exam     General: The patient is an obese female lying in bed appearing comfortable sitting up in the bed on her phone.  She answered her cell phone without difficulty.  HEENT: Vapotherm in place.   Respiratory: Effort appeared even and unlabored.  She was not conversationally dyspneic.  Neuro: Patient alert, oriented, speech clear  Psych: Pleasant and cooperative    Results Review:    I reviewed the patient's new clinical results.    Lab Results:  CBC:   Lab Results   Lab 20  1615 20  0448 20  0553   WBC 6.36 3.48 7.29   HEMOGLOBIN 13.5 11.7* 13.1   HEMATOCRIT 41.4 37.1 40.9   PLATELETS 294 258 306         CMP:   Lab Results   Lab 20  0448 20  0553 20  0223   SODIUM 139 140 142   POTASSIUM 4.2 4.1 3.9   CHLORIDE 100 101 104   CO2 27.0 29.0 28.0   BUN 42* 40* 42*   CREATININE 0.77 0.66 0.58   CALCIUM 9.4 9.7 9.4   BILIRUBIN 0.4 0.5 0.4   ALK PHOS 95 100 94   ALT (SGPT) 14 14 12   AST (SGOT) 31 29 22   GLUCOSE 132* 115* 121*         Culture Results:        Microbiology Results Abnormal     Procedure Component Value - Date/Time    Urine Culture - Urine, Urine, Clean Catch [[id number]]  (Abnormal) Collected: 20 1447    Lab Status: Preliminary result Specimen: Urine, Clean Catch Updated: 20 1254     Urine Culture >100,000 CFU/mL Gram Negative " Bacilli    Blood Culture - Blood, Arm, Left [[id number]] Collected: 12/02/20 1615    Lab Status: Preliminary result Specimen: Blood from Arm, Left Updated: 12/04/20 1700     Blood Culture No growth at 2 days    Blood Culture - Blood, Arm, Left [[id number]] Collected: 12/02/20 1618    Lab Status: Preliminary result Specimen: Blood from Arm, Left Updated: 12/04/20 1700     Blood Culture No growth at 2 days    Legionella Antigen, Urine - Urine, Urine, Clean Catch [[id number]]  (Normal) Collected: 12/03/20 1447    Lab Status: Final result Specimen: Urine, Clean Catch Updated: 12/03/20 1535     LEGIONELLA ANTIGEN, URINE Negative    S. Pneumo Ag Urine or CSF - Urine, Urine, Clean Catch [[id number]]  (Normal) Collected: 12/03/20 1447    Lab Status: Final result Specimen: Urine, Clean Catch Updated: 12/03/20 1534     Strep Pneumo Ag Negative    Respiratory Panel, PCR (WITHOUT COVID) - Swab, Nasopharynx [[id number]]  (Normal) Collected: 12/03/20 1234    Lab Status: Final result Specimen: Swab from Nasopharynx Updated: 12/03/20 1351     ADENOVIRUS, PCR Not Detected     Coronavirus 229E Not Detected     Coronavirus HKU1 Not Detected     Coronavirus NL63 Not Detected     Coronavirus OC43 Not Detected     Human Metapneumovirus Not Detected     Human Rhinovirus/Enterovirus Not Detected     Influenza B PCR Not Detected     Parainfluenza Virus 1 Not Detected     Parainfluenza Virus 2 Not Detected     Parainfluenza Virus 3 Not Detected     Parainfluenza Virus 4 Not Detected     Bordetella pertussis pcr Not Detected     Influenza A H1 2009 PCR Not Detected     Chlamydophila pneumoniae PCR Not Detected     Mycoplasma pneumo by PCR Not Detected     Influenza A PCR Not Detected     Influenza A H3 Not Detected     Influenza A H1 Not Detected     RSV, PCR Not Detected     Bordetella parapertussis PCR Not Detected    Narrative:      The coronavirus on the RVP is NOT COVID-19 and is NOT indicative of infection with COVID-19.     COVID PRE-OP /  PRE-PROCEDURE SCREENING ORDER (NO ISOLATION) - Swab, Nasopharynx [[id number]]  (Abnormal) Collected: 12/02/20 1728    Lab Status: Final result Specimen: Swab from Nasopharynx Updated: 12/02/20 1825    Narrative:      The following orders were created for panel order COVID PRE-OP / PRE-PROCEDURE SCREENING ORDER (NO ISOLATION) - Swab, Nasopharynx.  Procedure                               Abnormality         Status                     ---------                               -----------         ------                     COVID-19, [facility] IN-HOUS...[[id number]]  Abnormal            Final result                 Please view results for these tests on the individual orders.    COVID-19, [facility] IN-HOUSE,NP Swab (NO TRANSPORT MEDIA) 2 HR TAT - Swab, Nasopharynx [[id number]]  (Abnormal) Collected: 12/02/20 1728    Lab Status: Final result Specimen: Swab from Nasopharynx Updated: 12/02/20 1825     COVID19 Detected    Narrative:      Fact sheet for providers: https://www.fda.gov/media/191159/download     Fact sheet for patients: https://www.fda.gov/media/818020/download                Radiology:   Imaging Results (Last 72 Hours)     Procedure Component Value Units Date/Time    NM Lung Scan Perfusion Particulate [[id number]] Collected: 12/03/20 0852     Updated: 12/03/20 0857    Narrative:      EXAMINATION:  NM LUNG SCAN PERFUSION PARTICULATE-  12/3/2020 8:16 AM CST     HISTORY: Chest pain, acute, PE suspected, intermed prob, negative  D-dimer; U07.1-COVID-19.      COMPARISON: Chest x-ray on 12/2/2020.     TECHNIQUE: The patient was injected with 5.1 mCi of technetium 99m MAA  intravenously. Multiple projection imaging was then performed.     FINDINGS: There is a single small peripheral perfusion defect in the  right lung base laterally on the posterior image. The patient has an  elevated right hemidiaphragm on x-ray. This is likely related.       Impression:      Low probability of pulmonary embolus.  This report was finalized on  12/03/2020 08:54 by Dr. Isiah Benitez MD.    XR Chest 1 View [[id number]] Collected: 12/02/20 1636     Updated: 12/02/20 1640    Narrative:      EXAMINATION:  XR CHEST 1 VW-  12/2/2020 4:25 PM CST     HISTORY: Shortness of breath.     COMPARISON: 5/30/2012.     FINDINGS:  There is hypoventilation. There are bilateral infiltrates.  There is elevation/eventration of the right hemidiaphragm. There is  borderline cardiomegaly. No significant pleural effusion is seen.       Impression:      1. Hypoventilation.  2. Bilateral infiltrates may be infectious or inflammatory. Pulmonary  edema also possible.  3. Borderline cardiomegaly.        This report was finalized on 12/02/2020 16:37 by Dr. Isiah Benitez MD.          Assessment/Plan     Active Hospital Problems    Diagnosis   • **Pneumonia due to COVID-19 virus   • RITA (acute kidney injury) (CMS/HCC)   • COVID-19   • Acute respiratory failure with hypoxia (CMS/HCC)   • Benign essential hypertension   • Common variable immunodeficiency (CMS/HCC)       IMPRESSION:  COVID-19 infection with pneumonia and acute respiratory failure-status post convalescent plasma.  On remdesivir.  Acute kidney injury-resolved  Elevated CRP  Elevated ferritin  Elevated D-dimer elevated LDH  Pyuria with few squamous epithelial cells-urine culture in progress.  History of common variable immunodeficiency but no treatment in the last 20 years    RECOMMENDATION:   · Continue empiric ceftriaxone  · We will discontinue azithromycin as treated with that as an outpatient prior to coming in.    · Continue ceftriaxone for urinary tract infection pending urine culture results  · Continue dexamethasone  ·  continue remdesivir  · Continue anticoagulation per hospitalist  · Continue adjuvant therapy per hospitalist  · Continue maximal oxygen support.  · Encourage frequent change of positions and proning as much as possible    Discussed with JEAN Lee MD  12/05/20  14:56 CST

## 2020-12-06 NOTE — PROGRESS NOTES
[facility] Medicine Services  INPATIENT PROGRESS NOTE    Patient Name: Radha Smiley  Date of Admission: 12/2/2020  Today's Date: 12/06/20  Length of Stay: 4  Primary Care Physician: Adrien De Leon MD    Subjective   Chief Complaint: f/u covid 19/respiratory failure    HPI:  Patient feels more worn out and tired this morning.  More anxious.  She is back mask plus high flow to keep her sats around 92%.  ABG from this morning shows a PO2 of 60 and a pH of 7.45.  No chest pain or dizziness.  Afebrile overnight.      ROS:  All pertinent negatives and positives are as above. All other systems have been reviewed and are negative unless otherwise stated.     Objective    Temp:  [97.7 °F (36.5 °C)-98.7 °F (37.1 °C)] 97.7 °F (36.5 °C)  Heart Rate:  [] 113  Resp:  [18-28] 28  BP: (113-174)/() 137/95  Physical Exam  GEN: Resting at the moment, NAD, appears nontoxic  HEENT: Anicteric, trachea midline  Lungs: Equal chest rise bilaterally, increased respiratory rate but no overt accessory muscle use   heart: Tachycardic, regular rhythm  Abdomen: obese, soft  Extremities: No cyanosis or edema  Neuro: Moving all extremities with no obvious focal deficits      Results Review:  I have reviewed the labs, radiology results, and diagnostic studies.    Laboratory Data:   Results from last 7 days   Lab Units 12/04/20  0553 12/03/20  0448 12/02/20  1615   WBC 10*3/mm3 7.29 3.48 6.36   HEMOGLOBIN g/dL 13.1 11.7* 13.5   HEMATOCRIT % 40.9 37.1 41.4   PLATELETS 10*3/mm3 306 258 294        Results from last 7 days   Lab Units 12/06/20  0321 12/05/20  0223 12/04/20  0553   SODIUM mmol/L 142 142 140   POTASSIUM mmol/L 3.8 3.9 4.1   CHLORIDE mmol/L 105 104 101   CO2 mmol/L 27.0 28.0 29.0   BUN mg/dL 34* 42* 40*   CREATININE mg/dL 0.57 0.58 0.66   CALCIUM mg/dL 9.1 9.4 9.7   BILIRUBIN mg/dL 0.6 0.4 0.5   ALK PHOS U/L 113 94 100   ALT (SGPT) U/L 14 12 14   AST (SGOT) U/L 35* 22 29   GLUCOSE  mg/dL 118* 121* 115*       Culture Data:   No results found for: BLOODCX, URINECX, WOUNDCX, MRSACX, RESPCX, STOOLCX    Radiology Data:   Imaging Results (Last 24 Hours)     ** No results found for the last 24 hours. **          I have reviewed the patient's current medications.     Assessment/Plan     Active Hospital Problems    Diagnosis   • **Pneumonia due to COVID-19 virus   • RITA (acute kidney injury) (CMS/[facility])   • COVID-19   • Acute respiratory failure with hypoxia (CMS/[facility])   • Benign essential hypertension   • Common variable immunodeficiency (CMS/[facility])       #1 viral pneumonia due to COVID-19 virus -status post plasma.  She is on remdesivir.  She is getting Decadron, Pepcid, zinc, statin.  Continue to monitor closely in the ICU.  Now back on mask and high flow.  Currently looks comfortable but low threshold to intubate today if any worsening.  ABG from this morning was okay.  Will likely get another one the early afternoon to look for any tiring.    #2 acute respiratory failure hypoxia -in the setting of #1 above.  VQ scan was low probability for PE.  Also has had some antibiotics added to cover for any potential secondary infection.  As above currently borderline and trying to keep her off of ventilator.    #3 RITA -resolved with fluids given on arrival.  She had poor p.o. intake.  Encourage p.o. intake.  Renal adjust meds appropriate.    #4 pleuritic chest pain -Toradol as needed.  We will try to avoid opiates given patient's history of addiction in the past.  As above VQ scan low probability for PE.    #5 Common variable immune deficiency -patient has had to have IVIG in the past but states been 20+ years.  Denies chronic problems from this.    #6 history of substance abuse -as above she had been sober for 20+ years per her report.  Avoid narcotics as able.    #7 essential hypertension -continue metoprolol and lisinopril.  Seems to be getting more hypertensive in the early evenings will make her lisinopril  twice daily.    Discharge Planning: Ongoing.  Monitor closely in the ICU.  Currently guarded and potential for intubation if any worsening.    Electronically signed by Dav Fowler DO, 12/06/20, 08:16 CST.

## 2020-12-06 NOTE — PROGRESS NOTES
INFECTIOUS DISEASES PROGRESS NOTE    Patient:  Radha Smiley  YOB: 1965  MRN: [id number]   Admit date: 12/2/2020   Admitting Physician: Dav Fowler DO  Primary Care Physician: Adrien De Leon MD    Chief Complaint:  SOA      Interval History: Patient has required nonrebreather mask in addition to Vapotherm.  There have been discussions about intubation but she would like to defer that.    She is not been able to tolerate BiPAP at night.  Apparently she tried BiPAP for a while last night so did not prone.            Allergies:   Allergies   Allergen Reactions   • Demerol [Meperidine] Rash       Current Meds:     Current Facility-Administered Medications:   •  acetaminophen (TYLENOL) tablet 650 mg, 650 mg, Oral, Q4H PRN, 650 mg at 12/05/20 1320 **OR** acetaminophen (TYLENOL) 160 MG/5ML solution 650 mg, 650 mg, Oral, Q4H PRN **OR** acetaminophen (TYLENOL) suppository 650 mg, 650 mg, Rectal, Q4H PRN, Gene Lui MD  •  atorvastatin (LIPITOR) tablet 10 mg, 10 mg, Oral, Nightly, Gene Lui MD, 10 mg at 12/05/20 2015  •  benzonatate (TESSALON) capsule 200 mg, 200 mg, Oral, Q4H PRN, Gene Lui MD, 200 mg at 12/05/20 2016  •  cefTRIAXone (ROCEPHIN) 1 g/100 mL 0.9% NS (MBP), 1 g, Intravenous, Q24H, Tressa Trevino MD, 1 g at 12/05/20 1857  •  cholecalciferol (VITAMIN D3) tablet 1,000 Units, 1,000 Units, Oral, Daily, Gene Lui MD, 1,000 Units at 12/06/20 0910  •  cloNIDine (CATAPRES) tablet 0.1 mg, 0.1 mg, Oral, BID, Leidy Joe DO, 0.1 mg at 12/06/20 0609  •  dexamethasone (DECADRON) tablet 6 mg, 6 mg, Oral, Daily With Breakfast, 6 mg at 12/06/20 0910 **OR** dexamethasone (DECADRON) injection 6 mg, 6 mg, Intravenous, Daily With Breakfast, Gene Lui MD  •  dextromethorphan polistirex ER (DELSYM) 30 MG/5ML oral suspension 60 mg, 10 mL, Oral, Q12H PRN, Gene Lui MD  •  enoxaparin (LOVENOX) syringe 40 mg, 40 mg, Subcutaneous, Q24H,  Dav Fowler DO, 40 mg at 20 0910  •  famotidine (PEPCID) tablet 20 mg, 20 mg, Oral, BID, Gene Lui MD, 20 mg at 20 0910  •  ketorolac (TORADOL) injection 15 mg, 15 mg, Intravenous, Q6H PRN, Dav Fowler DO, 15 mg at 20 1113  •  lisinopril (PRINIVIL,ZESTRIL) tablet 20 mg, 20 mg, Oral, Q12H, Dav Fowler DO  •  melatonin tablet 6 mg, 6 mg, Oral, Nightly, Dav Fowler DO, 6 mg at 20  •  metoprolol tartrate (LOPRESSOR) tablet 25 mg, 25 mg, Oral, Q12H, Dav Fowler DO, 25 mg at 20 0910  •  ondansetron (ZOFRAN) tablet 4 mg, 4 mg, Oral, Q6H PRN **OR** ondansetron (ZOFRAN) injection 4 mg, 4 mg, Intravenous, Q6H PRN, Gene Lui MD  •  Pharmacy Consult - Remdesivir, , Does not apply, Continuous PRN, Gene Lui MD  •  [COMPLETED] remdesivir 200 mg in sodium chloride 0.9 % 250 mL IVPB (powder vial), 200 mg, Intravenous, Q24H, 200 mg at 20 0048 **FOLLOWED BY** remdesivir 100 mg in sodium chloride 0.9 % 250 mL IVPB (powder vial), 100 mg, Intravenous, Q24H, Gene Lui MD, 100 mg at 20 2339  •  [COMPLETED] Insert peripheral IV, , , Once **AND** sodium chloride 0.9 % flush 10 mL, 10 mL, Intravenous, PRN, Gene Lui MD  •  sodium chloride 0.9 % flush 10 mL, 10 mL, Intravenous, Q12H, Gene Lui MD, 10 mL at 20 0911  •  sodium chloride 0.9 % flush 10 mL, 10 mL, Intravenous, PRN, Gene Lui MD  •  technetium sestamibi (CARDIOLITE) injection 1 dose, 1 dose, Intravenous, Once in imaging, Dav Fowler, DO  •  vitamin C (ASCORBIC ACID) tablet 500 mg, 500 mg, Oral, Daily, Gene Lui MD, 500 mg at 20 0910  •  zinc sulfate (ZINCATE) capsule 220 mg, 220 mg, Oral, Daily, Gene Lui MD, 220 mg at 20 0911      Review of Systems   Unable to perform ROS: Acuity of condition       Objective     Vital Signs:  Temp (24hrs), Av.1 °F (36.7 °C), Min:97.7 °F (36.5 °C), Max:98.7 °F (37.1  "°C)      /97   Pulse 98   Temp 97.7 °F (36.5 °C) (Axillary)   Resp 28   Ht 170.2 cm (67\")   Wt 109 kg (240 lb 6.4 oz)   SpO2 91%   BMI 37.65 kg/m²         Physical Exam     General: The patient is lying in bed appearing comfortable appearing and initially appearing to be asleep.  I tried to call her cell phone and got no answer.  I then went over knocked on the door and she did not appear to wake up.  While I was charting the nurse noted she appeared to be moving some in the bed so I called her and she did answer her cell phone at that point without difficulty.  HEENT: Vapotherm in place.  Nonrebreather facemask in place  Respiratory: Effort appeared even and somewhat labored she was noted to be a bit conversationally dyspneic today  Neuro: Patient alert, oriented, speech clear  Psych: Pleasant and cooperative    Results Review:    I reviewed the patient's new clinical results.    Lab Results:  CBC:   Lab Results   Lab 12/02/20  1615 12/03/20  0448 12/04/20  0553   WBC 6.36 3.48 7.29   HEMOGLOBIN 13.5 11.7* 13.1   HEMATOCRIT 41.4 37.1 40.9   PLATELETS 294 258 306         CMP:   Lab Results   Lab 12/04/20  0553 12/05/20  0223 12/06/20  0321   SODIUM 140 142 142   POTASSIUM 4.1 3.9 3.8   CHLORIDE 101 104 105   CO2 29.0 28.0 27.0   BUN 40* 42* 34*   CREATININE 0.66 0.58 0.57   CALCIUM 9.7 9.4 9.1   BILIRUBIN 0.5 0.4 0.6   ALK PHOS 100 94 113   ALT (SGPT) 14 12 14   AST (SGOT) 29 22 35*   GLUCOSE 115* 121* 118*       Range & Units 10:35 04:35 3d ago   Site  Right Brachial  Left Radial  Right Radial    Jairo's Test  N/A  Positive  Positive    pH, Arterial   7.350 - 7.450 pH units 7.452High   7.457High  CM  7.477High  CM    Comment: 83 Value above reference range   pCO2, Arterial   35.0 - 45.0 mm Hg 38.7  38.5  40.9    pO2, Arterial   83.0 - 108.0 mm Hg 52.4Low Critical   60.3Low  CM  44.0Low Critical  CM    Comment: 85 Value below critical limit   HCO3, Arterial   20.0 - 26.0 mmol/L 27.0High   27.2High  CM  " 30.2High  CM    Comment: 83 Value above reference range   Base Excess, Arterial   0.0 - 2.0 mmol/L 2.9High   3.2High  CM  6.0High  CM    Comment: 83 Value above reference range   O2 Saturation, Arterial   94.0 - 99.0 % 89.8Low   92.9Low  CM  82.7Low  CM    Comment: 84 Value below reference range        >>>>>> was on  vapotherm alone with ABG      Culture Results:        Microbiology Results Abnormal     Procedure Component Value - Date/Time    Urine Culture - Urine, Urine, Clean Catch [[id number]]  (Abnormal)  (Susceptibility) Collected: 12/03/20 1447    Lab Status: Final result Specimen: Urine, Clean Catch Updated: 12/06/20 0934     Urine Culture >100,000 CFU/mL Klebsiella pneumoniae ssp pneumoniae    Susceptibility      Klebsiella pneumoniae ssp pneumoniae     ARACELI     Ampicillin Resistant     Ampicillin + Sulbactam Susceptible     Cefazolin Susceptible     Cefepime Susceptible     Ceftazidime Susceptible     Ceftriaxone Susceptible     Gentamicin Susceptible     Levofloxacin Susceptible     Nitrofurantoin Intermediate     Piperacillin + Tazobactam Susceptible     Tetracycline Susceptible     Trimethoprim + Sulfamethoxazole Susceptible                    Blood Culture - Blood, Arm, Left [[id number]] Collected: 12/02/20 1615    Lab Status: Preliminary result Specimen: Blood from Arm, Left Updated: 12/05/20 1700     Blood Culture No growth at 3 days    Blood Culture - Blood, Arm, Left [[id number]] Collected: 12/02/20 1618    Lab Status: Preliminary result Specimen: Blood from Arm, Left Updated: 12/05/20 1700     Blood Culture No growth at 3 days    Legionella Antigen, Urine - Urine, Urine, Clean Catch [[id number]]  (Normal) Collected: 12/03/20 1447    Lab Status: Final result Specimen: Urine, Clean Catch Updated: 12/03/20 1535     LEGIONELLA ANTIGEN, URINE Negative    S. Pneumo Ag Urine or CSF - Urine, Urine, Clean Catch [[id number]]  (Normal) Collected: 12/03/20 1447    Lab Status: Final result Specimen: Urine, Clean  Catch Updated: 12/03/20 1534     Strep Pneumo Ag Negative    Respiratory Panel, PCR (WITHOUT COVID) - Swab, Nasopharynx [[id number]]  (Normal) Collected: 12/03/20 1234    Lab Status: Final result Specimen: Swab from Nasopharynx Updated: 12/03/20 1351     ADENOVIRUS, PCR Not Detected     Coronavirus 229E Not Detected     Coronavirus HKU1 Not Detected     Coronavirus NL63 Not Detected     Coronavirus OC43 Not Detected     Human Metapneumovirus Not Detected     Human Rhinovirus/Enterovirus Not Detected     Influenza B PCR Not Detected     Parainfluenza Virus 1 Not Detected     Parainfluenza Virus 2 Not Detected     Parainfluenza Virus 3 Not Detected     Parainfluenza Virus 4 Not Detected     Bordetella pertussis pcr Not Detected     Influenza A H1 2009 PCR Not Detected     Chlamydophila pneumoniae PCR Not Detected     Mycoplasma pneumo by PCR Not Detected     Influenza A PCR Not Detected     Influenza A H3 Not Detected     Influenza A H1 Not Detected     RSV, PCR Not Detected     Bordetella parapertussis PCR Not Detected    Narrative:      The coronavirus on the RVP is NOT COVID-19 and is NOT indicative of infection with COVID-19.     COVID PRE-OP / PRE-PROCEDURE SCREENING ORDER (NO ISOLATION) - Swab, Nasopharynx [[id number]]  (Abnormal) Collected: 12/02/20 1728    Lab Status: Final result Specimen: Swab from Nasopharynx Updated: 12/02/20 1825    Narrative:      The following orders were created for panel order COVID PRE-OP / PRE-PROCEDURE SCREENING ORDER (NO ISOLATION) - Swab, Nasopharynx.  Procedure                               Abnormality         Status                     ---------                               -----------         ------                     COVID-19, [facility] IN-HOUS...[[id number]]  Abnormal            Final result                 Please view results for these tests on the individual orders.    COVID-19, [facility] IN-HOUSE,NP Swab (NO TRANSPORT MEDIA) 2 HR TAT - Swab, Nasopharynx [[id number]]  (Abnormal)  Collected: 12/02/20 1728    Lab Status: Final result Specimen: Swab from Nasopharynx Updated: 12/02/20 1825     COVID19 Detected    Narrative:      Fact sheet for providers: https://www.fda.gov/media/997086/download     Fact sheet for patients: https://www.fda.gov/media/141403/download                Radiology:   Imaging Results (Last 72 Hours)     ** No results found for the last 72 hours. **          Assessment/Plan     Active Hospital Problems    Diagnosis   • **Pneumonia due to COVID-19 virus   • RITA (acute kidney injury) (CMS/HCC)   • COVID-19   • Acute respiratory failure with hypoxia (CMS/HCC)   • Benign essential hypertension   • Common variable immunodeficiency (CMS/HCC)       IMPRESSION:  COVID-19 infection with pneumonia and acute respiratory failure-status post convalescent plasma.  On remdesivir.  Acute kidney injury-resolved  Elevated CRP  Elevated ferritin  Elevated D-dimer elevated LDH  Pyuria with few squamous epithelial cells-urine culture in progress.  History of common variable immunodeficiency but no treatment in the last 20 years    RECOMMENDATION:   · Continue ceftriaxone  · We will discontinue azithromycin as treated with that as an outpatient prior to coming in.    · Continue ceftriaxone for urinary tract infection pending urine culture results  · Continue dexamethasone  ·  continue remdesivir  · Continue anticoagulation per hospitalist  · Continue adjuvant therapy per hospitalist  · Continue maximal oxygen support.  · Reviewed frequent change of positions and proning as much as possible with patient again as possibly a means to keep her from having to get intubated.  · Order MRSA nasal screen    Discussed with JEAN Miller MD  12/06/20  13:04 CST

## 2020-12-06 NOTE — PAYOR COMM NOTE
"12/6 CLINICAL UPDATE  UR  [id number]  [id number]  Rashad Smiley (55 y.o. Female)     Date of Birth Social Security Number Address Home Phone MRN    1965  [id number] [address] [phone] [id number]    Faith Marital Status          Other        Admission Date Admission Type Admitting Provider Attending Provider Department, Room/Bed    12/2/20 Emergency Dav Fowler, Dav Fisher,  [facility] INTENSIVE CARE, I016/1    Discharge Date Discharge Disposition Discharge Destination                       Attending Provider: Dav Fowler DO    Allergies: Demerol [Meperidine]    Isolation: Enh Drop/Con   Infection: COVID (confirmed) (12/02/20)   Code Status: CPR    Ht: 170.2 cm (67\")   Wt: 109 kg (240 lb 6.4 oz)    Admission Cmt: None   Principal Problem: Pneumonia due to COVID-19 virus [U07.1,J12.89]                 Active Insurance as of 12/2/2020     Primary Coverage     Payor Plan Insurance Group Employer/Plan Group    [employer] BLUE [employer] PPO [id number]     Payor Plan Address Payor Plan Phone Number Payor Plan Fax Number Effective Dates    [address] [phone]  11/1/2020 - None Entered    [facility] [id number]       Subscriber Name Subscriber Birth Date Member ID       RASHAD SMILEY 1965 [id number]                 Emergency Contacts      (Rel.) Home Phone Work Phone Mobile Phone    ROMALETTY SHAIKH (Spouse) -- -- [phone]    HERFLOR WOLF (Daughter) -- -- [phone]              Current Facility-Administered Medications   Medication Dose Route Frequency Provider Last Rate Last Admin   • acetaminophen (TYLENOL) tablet 650 mg  650 mg Oral Q4H PRN Gene Lui MD   650 mg at 12/05/20 1320    Or   • acetaminophen (TYLENOL) 160 MG/5ML solution 650 mg  650 mg Oral Q4H PRN Gene Lui MD        Or   • acetaminophen (TYLENOL) suppository 650 mg  650 mg Rectal Q4H PRN Gene Lui" MD ANTONIA       • atorvastatin (LIPITOR) tablet 10 mg  10 mg Oral Nightly Gene Lui MD   10 mg at 12/05/20 2015   • benzonatate (TESSALON) capsule 200 mg  200 mg Oral Q4H PRN Gene Lui MD   200 mg at 12/05/20 2016   • cefTRIAXone (ROCEPHIN) 1 g/100 mL 0.9% NS (MBP)  1 g Intravenous Q24H Tressa Trevino MD   1 g at 12/05/20 1857   • cholecalciferol (VITAMIN D3) tablet 1,000 Units  1,000 Units Oral Daily Gene Lui MD   1,000 Units at 12/06/20 0910   • cloNIDine (CATAPRES) tablet 0.1 mg  0.1 mg Oral BID Leidy Joe DO   0.1 mg at 12/06/20 0609   • dexamethasone (DECADRON) tablet 6 mg  6 mg Oral Daily With Breakfast Gene Lui MD   6 mg at 12/06/20 0910    Or   • dexamethasone (DECADRON) injection 6 mg  6 mg Intravenous Daily With Breakfast Gene Lui MD       • dextromethorphan polistirex ER (DELSYM) 30 MG/5ML oral suspension 60 mg  10 mL Oral Q12H PRN Gene Lui MD       • enoxaparin (LOVENOX) syringe 40 mg  40 mg Subcutaneous Q24H Dav Fowler DO   40 mg at 12/06/20 0910   • famotidine (PEPCID) tablet 20 mg  20 mg Oral BID Gene Lui MD   20 mg at 12/06/20 0910   • ketorolac (TORADOL) injection 15 mg  15 mg Intravenous Q6H PRN Dav Fowler DO   15 mg at 12/06/20 1113   • lisinopril (PRINIVIL,ZESTRIL) tablet 20 mg  20 mg Oral Q12H Dav Fowler DO       • melatonin tablet 6 mg  6 mg Oral Nightly Dav Fowler DO   6 mg at 12/05/20 2015   • metoprolol tartrate (LOPRESSOR) tablet 25 mg  25 mg Oral Q12H Dav Fowler DO   25 mg at 12/06/20 0910   • ondansetron (ZOFRAN) tablet 4 mg  4 mg Oral Q6H PRN Gene Lui MD        Or   • ondansetron (ZOFRAN) injection 4 mg  4 mg Intravenous Q6H PRN Gene Lui MD       • Pharmacy Consult - Remdesivir   Does not apply Continuous PRN Gene Lui MD       • remdesivir 100 mg in sodium chloride 0.9 % 250 mL IVPB (powder vial)  100 mg Intravenous Q24H Gene Lui MD    100 mg at 12/05/20 2339   • sodium chloride 0.9 % flush 10 mL  10 mL Intravenous PRN Gene Lui MD       • sodium chloride 0.9 % flush 10 mL  10 mL Intravenous Q12H Gene Lui MD   10 mL at 12/06/20 0911   • sodium chloride 0.9 % flush 10 mL  10 mL Intravenous PRN Gene Lui MD       • technetium sestamibi (CARDIOLITE) injection 1 dose  1 dose Intravenous Once in imaging Dav Fowler DO       • vitamin C (ASCORBIC ACID) tablet 500 mg  500 mg Oral Daily Gene Lui MD   500 mg at 12/06/20 0910   • zinc sulfate (ZINCATE) capsule 220 mg  220 mg Oral Daily Gene Lui MD   220 mg at 12/06/20 0911       Lab Results (last 24 hours)     Procedure Component Value Units Date/Time    Blood Gas, Arterial - [[id number]]  (Abnormal) Collected: 12/06/20 1035    Specimen: Arterial Blood Updated: 12/06/20 1049     Site Right Brachial     Jairo's Test N/A     pH, Arterial 7.452 pH units      Comment: 83 Value above reference range        pCO2, Arterial 38.7 mm Hg      pO2, Arterial 52.4 mm Hg      Comment: 85 Value below critical limit        HCO3, Arterial 27.0 mmol/L      Comment: 83 Value above reference range        Base Excess, Arterial 2.9 mmol/L      Comment: 83 Value above reference range        O2 Saturation, Arterial 89.8 %      Comment: 84 Value below reference range        Temperature 37.0 C      Barometric Pressure for Blood Gas 749 mmHg      Modality Heated HFNC     FIO2 100 %      Flow Rate 40.0 lpm      Ventilator Mode NA     Notified Who DR FOWLER     Notified By [id number]     Notified Time 12/06/2020 10:42     Collected by [id number]     Comment: Meter: [id number]     :  [id number]        pCO2, Temperature Corrected 38.7 mm Hg      pH, Temp Corrected 7.452 pH Units      pO2, Temperature Corrected 52.4 mm Hg     Urine Culture - Urine, Urine, Clean Catch [[id number]]  (Abnormal)  (Susceptibility) Collected: 12/03/20 1447    Specimen: Urine, Clean Catch Updated: 12/06/20  0934     Urine Culture >100,000 CFU/mL Klebsiella pneumoniae ssp pneumoniae    Susceptibility      Klebsiella pneumoniae ssp pneumoniae     ARACELI     Ampicillin Resistant     Ampicillin + Sulbactam Susceptible     Cefazolin Susceptible     Cefepime Susceptible     Ceftazidime Susceptible     Ceftriaxone Susceptible     Gentamicin Susceptible     Levofloxacin Susceptible     Nitrofurantoin Intermediate     Piperacillin + Tazobactam Susceptible     Tetracycline Susceptible     Trimethoprim + Sulfamethoxazole Susceptible                    Blood Gas, Arterial - [[id number]]  (Abnormal) Collected: 12/06/20 0435    Specimen: Arterial Blood Updated: 12/06/20 0443     Site Left Radial     Jairo's Test Positive     pH, Arterial 7.457 pH units      Comment: 83 Value above reference range        pCO2, Arterial 38.5 mm Hg      pO2, Arterial 60.3 mm Hg      Comment: 84 Value below reference range        HCO3, Arterial 27.2 mmol/L      Comment: 83 Value above reference range        Base Excess, Arterial 3.2 mmol/L      Comment: 83 Value above reference range        O2 Saturation, Arterial 92.9 %      Comment: 84 Value below reference range        Temperature 37.0 C      Barometric Pressure for Blood Gas 749 mmHg      Modality Heated HFNC     FIO2 100 %      Flow Rate 40.0 lpm      Ventilator Mode NA     Collected by [id number]     Comment: Meter: [id number]     :  [id number]        pCO2, Temperature Corrected 38.5 mm Hg      pH, Temp Corrected 7.457 pH Units      pO2, Temperature Corrected 60.3 mm Hg     Comprehensive Metabolic Panel [[id number]]  (Abnormal) Collected: 12/06/20 0321    Specimen: Blood Updated: 12/06/20 0417     Glucose 118 mg/dL      BUN 34 mg/dL      Creatinine 0.57 mg/dL      Sodium 142 mmol/L      Potassium 3.8 mmol/L      Chloride 105 mmol/L      CO2 27.0 mmol/L      Calcium 9.1 mg/dL      Total Protein 6.4 g/dL      Albumin 3.70 g/dL      ALT (SGPT) 14 U/L      AST (SGOT) 35 U/L      Alkaline  Phosphatase 113 U/L      Total Bilirubin 0.6 mg/dL      eGFR Non African Amer 110 mL/min/1.73      Globulin 2.7 gm/dL      A/G Ratio 1.4 g/dL      BUN/Creatinine Ratio 59.6     Anion Gap 10.0 mmol/L     Narrative:      GFR Normal >60  Chronic Kidney Disease <60  Kidney Failure <15      Blood Culture - Blood, Arm, Left [[id number]] Collected: 12/02/20 1615    Specimen: Blood from Arm, Left Updated: 12/05/20 1700     Blood Culture No growth at 3 days    Blood Culture - Blood, Arm, Left [[id number]] Collected: 12/02/20 1618    Specimen: Blood from Arm, Left Updated: 12/05/20 1700     Blood Culture No growth at 3 days        Imaging Results (Last 24 Hours)     ** No results found for the last 24 hours. **        Orders (last 24 hrs)      Start     Ordered    12/07/20 0600  CBC & Differential  Morning Draw      12/06/20 1024    12/07/20 0600  Magnesium  Morning Draw      12/06/20 1024    12/07/20 0600  Phosphorus  Morning Draw      12/06/20 1024    12/06/20 2100  lisinopril (PRINIVIL,ZESTRIL) tablet 20 mg  Every 12 Hours Scheduled      12/06/20 0815    12/06/20 1400  Blood Gas, Arterial -  Timed      12/06/20 1018    12/06/20 1050  Blood Gas, Arterial -  Once      12/06/20 1035    12/06/20 0615  cloNIDine (CATAPRES) tablet 0.1 mg  Every 12 Hours Scheduled,   Status:  Discontinued      12/06/20 0524    12/06/20 0615  cloNIDine (CATAPRES) tablet 0.1 mg  2 Times Daily      12/06/20 0526    12/06/20 0444  Blood Gas, Arterial -  Once      12/06/20 0435    12/06/20 0419  Blood Gas, Arterial -Obtain on: Current Settings  Once      12/06/20 0419    12/05/20 0900  lisinopril (PRINIVIL,ZESTRIL) tablet 20 mg  Daily,   Status:  Discontinued      12/04/20 1024    12/04/20 0900  enoxaparin (LOVENOX) syringe 40 mg  Every 24 Hours      12/03/20 1426    12/04/20 0000  remdesivir 100 mg in sodium chloride 0.9 % 250 mL IVPB (powder vial)  Every 24 Hours      12/02/20 2142    12/03/20 2100  melatonin tablet 6 mg  Nightly      12/03/20 0681     12/03/20 2100  metoprolol tartrate (LOPRESSOR) tablet 25 mg  Every 12 Hours Scheduled      12/03/20 1906    12/03/20 1830  cefTRIAXone (ROCEPHIN) 1 g/100 mL 0.9% NS (MBP)  Every 24 Hours      12/03/20 1732    12/03/20 1830  AZITHROMYCIN 500 MG/250 ML 0.9% NS IVPB (vial-mate)  Every 24 Hours,   Status:  Discontinued      12/03/20 1732 12/03/20 1425  ketorolac (TORADOL) injection 15 mg  Every 6 Hours PRN      12/03/20 1426    12/03/20 0915  technetium sestamibi (CARDIOLITE) injection 1 dose  Once in Imaging      12/03/20 0817    12/03/20 0900  zinc sulfate (ZINCATE) capsule 220 mg  Daily      12/02/20 2148 12/03/20 0900  cholecalciferol (VITAMIN D3) tablet 1,000 Units  Daily      12/02/20 2148 12/03/20 0900  vitamin C (ASCORBIC ACID) tablet 500 mg  Daily      12/02/20 2148 12/03/20 0900  famotidine (PEPCID) tablet 20 mg  2 times daily      12/02/20 2328 12/03/20 0800  dexamethasone (DECADRON) tablet 6 mg  Daily With Breakfast      12/02/20 2147 12/03/20 0800  dexamethasone (DECADRON) injection 6 mg  Daily With Breakfast      12/02/20 2147 12/03/20 0600  Document Pulse Oximetry - On Room Air / Home O2 Level  Daily     Comments: Reapply Oxygen if O2 Sat Drops Below 88%    12/02/20 2147 12/03/20 0600  Comprehensive Metabolic Panel  Daily      12/02/20 2147 12/02/20 2245  sodium chloride 0.9 % flush 10 mL  Every 12 Hours Scheduled      12/02/20 2147 12/02/20 2245  atorvastatin (LIPITOR) tablet 10 mg  Nightly      12/02/20 2148 12/02/20 2200  Incentive Spirometry  Every 2 Hours While Awake      12/02/20 2147 12/02/20 2148  Intake & Output  Every Shift      12/02/20 2147 12/02/20 2147  acetaminophen (TYLENOL) tablet 650 mg  Every 4 Hours PRN      12/02/20 2147 12/02/20 2147  acetaminophen (TYLENOL) 160 MG/5ML solution 650 mg  Every 4 Hours PRN      12/02/20 2147 12/02/20 2147  acetaminophen (TYLENOL) suppository 650 mg  Every 4 Hours PRN      12/02/20 2147 12/02/20 2147   ondansetron (ZOFRAN) tablet 4 mg  Every 6 Hours PRN      12/02/20 2147 12/02/20 2147  ondansetron (ZOFRAN) injection 4 mg  Every 6 Hours PRN      12/02/20 2147 12/02/20 2147  Pharmacy Consult - Remdesivir  Continuous PRN      12/02/20 2147 12/02/20 2147  sodium chloride 0.9 % flush 10 mL  As Needed      12/02/20 2147 12/02/20 2147  benzonatate (TESSALON) capsule 200 mg  Every 4 Hours PRN      12/02/20 2147 12/02/20 2147  dextromethorphan polistirex ER (DELSYM) 30 MG/5ML oral suspension 60 mg  Every 12 Hours PRN      12/02/20 2147 12/02/20 1607  sodium chloride 0.9 % flush 10 mL  As Needed      12/02/20 1608    Unscheduled  Transfuse Convalescent Plasma for COVID-19, 2 Units  Transfusion      12/02/20 2147    Signed and Held  enoxaparin (LOVENOX) syringe 40 mg  Every 24 Hours,   Status:  Canceled      Signed and Held    --  metoprolol tartrate (LOPRESSOR) 25 MG tablet  2 Times Daily      12/02/20 2233    --  lisinopril (PRINIVIL,ZESTRIL) 40 MG tablet  Daily      12/02/20 2233    --  cloNIDine (CATAPRES) 0.1 MG tablet  2 Times Daily      12/02/20 2233    --  benazepril-hydrochlorthiazide (LOTENSIN HCT) 20-12.5 MG per tablet  Daily      12/02/20 2233    --  SCANNED EKG      12/02/20 0000    --  SCANNED - TELEMETRY        12/02/20 0000                Ventilator/Non-Invasive Ventilation Settings (From admission, onward)     Start     Ordered    12/03/20 0513  NIPPV (CPAP or BIPAP)  Until Discontinued     Question Answer Comment   Indication: Acute Respiratory Failure    Type: BIPAP    NIPPV Mask Interface: Full Face Mask    Titrate for SPO2 90%        12/03/20 0512                   Physician Progress Notes (last 24 hours) (Notes from 12/05/20 1415 through 12/06/20 1415)      Dav Fowler DO at 12/06/20 0816              [facility]  INPATIENT PROGRESS NOTE    Patient Name: Radha Smiley  Date of Admission: 12/2/2020  Today's Date:  12/06/20  Length of Stay: 4  Primary Care Physician: Adrien De Leon MD    Subjective   Chief Complaint: f/u covid 19/respiratory failure    HPI:  Patient feels more worn out and tired this morning.  More anxious.  She is back mask plus high flow to keep her sats around 92%.  ABG from this morning shows a PO2 of 60 and a pH of 7.45.  No chest pain or dizziness.  Afebrile overnight.      ROS:  All pertinent negatives and positives are as above. All other systems have been reviewed and are negative unless otherwise stated.     Objective    Temp:  [97.7 °F (36.5 °C)-98.7 °F (37.1 °C)] 97.7 °F (36.5 °C)  Heart Rate:  [] 113  Resp:  [18-28] 28  BP: (113-174)/() 137/95  Physical Exam  GEN: Resting at the moment, NAD, appears nontoxic  HEENT: Anicteric, trachea midline  Lungs: Equal chest rise bilaterally, increased respiratory rate but no overt accessory muscle use   heart: Tachycardic, regular rhythm  Abdomen: obese, soft  Extremities: No cyanosis or edema  Neuro: Moving all extremities with no obvious focal deficits      Results Review:  I have reviewed the labs, radiology results, and diagnostic studies.    Laboratory Data:   Results from last 7 days   Lab Units 12/04/20  0553 12/03/20  0448 12/02/20  1615   WBC 10*3/mm3 7.29 3.48 6.36   HEMOGLOBIN g/dL 13.1 11.7* 13.5   HEMATOCRIT % 40.9 37.1 41.4   PLATELETS 10*3/mm3 306 258 294        Results from last 7 days   Lab Units 12/06/20  0321 12/05/20  0223 12/04/20  0553   SODIUM mmol/L 142 142 140   POTASSIUM mmol/L 3.8 3.9 4.1   CHLORIDE mmol/L 105 104 101   CO2 mmol/L 27.0 28.0 29.0   BUN mg/dL 34* 42* 40*   CREATININE mg/dL 0.57 0.58 0.66   CALCIUM mg/dL 9.1 9.4 9.7   BILIRUBIN mg/dL 0.6 0.4 0.5   ALK PHOS U/L 113 94 100   ALT (SGPT) U/L 14 12 14   AST (SGOT) U/L 35* 22 29   GLUCOSE mg/dL 118* 121* 115*       Culture Data:   No results found for: BLOODCX, URINECX, WOUNDCX, MRSACX, RESPCX, STOOLCX    Radiology Data:   Imaging Results (Last 24 Hours)      ** No results found for the last 24 hours. **          I have reviewed the patient's current medications.     Assessment/Plan     Active Hospital Problems    Diagnosis   • **Pneumonia due to COVID-19 virus   • RITA (acute kidney injury) (CMS/HCC)   • COVID-19   • Acute respiratory failure with hypoxia (CMS/HCC)   • Benign essential hypertension   • Common variable immunodeficiency (CMS/HCC)       #1 viral pneumonia due to COVID-19 virus -status post plasma.  She is on remdesivir.  She is getting Decadron, Pepcid, zinc, statin.  Continue to monitor closely in the ICU.  Now back on mask and high flow.  Currently looks comfortable but low threshold to intubate today if any worsening.  ABG from this morning was okay.  Will likely get another one the early afternoon to look for any tiring.    #2 acute respiratory failure hypoxia -in the setting of #1 above.  VQ scan was low probability for PE.  Also has had some antibiotics added to cover for any potential secondary infection.  As above currently borderline and trying to keep her off of ventilator.    #3 RITA -resolved with fluids given on arrival.  She had poor p.o. intake.  Encourage p.o. intake.  Renal adjust meds appropriate.    #4 pleuritic chest pain -Toradol as needed.  We will try to avoid opiates given patient's history of addiction in the past.  As above VQ scan low probability for PE.    #5 Common variable immune deficiency -patient has had to have IVIG in the past but states been 20+ years.  Denies chronic problems from this.    #6 history of substance abuse -as above she had been sober for 20+ years per her report.  Avoid narcotics as able.    #7 essential hypertension -continue metoprolol and lisinopril.  Seems to be getting more hypertensive in the early evenings will make her lisinopril twice daily.    Discharge Planning: Ongoing.  Monitor closely in the ICU.  Currently guarded and potential for intubation if any worsening.    Electronically signed by  "Dav Fowler DO, 12/06/20, 08:16 CST.      Electronically signed by Dav Fowler DO at 12/06/20 1024     Tressa Trevino MD at 12/05/20 1456          INFECTIOUS DISEASES PROGRESS NOTE    Patient:  Radha Smiley  YOB: 1965  MRN: [id number]   Admit date: 12/2/2020   Admitting Physician: Dav Fowler DO  Primary Care Physician: Adrien De Leon MD    Chief Complaint: \"Just hurting in chest\" from coughing        Interval History: Rosa, RN at bedside to give the patient Toradol.  She reports her chest is hurting from coughing..  \"Symptomatic for over 2 weeks\"    Friday before thanksgiving  - started with nasal congestion, breathing heavy, fever and diarrhea.  She contacted Dr. De Leon.  She said he told her to call back on Monday if she was not better.    Tested for Covid November 24, tues. before thanksgiving.  Started on 20 mg prednisone daily per Dr De Leon  She reports she was given an antibiotic but told to start it later.  Then started abx - last Wednesday - Z Pack     proned herself last night and it helped per nursing-on Vapotherm now without nonrebreather    Allergies:   Allergies   Allergen Reactions   • Demerol [Meperidine] Rash       Current Meds:     Current Facility-Administered Medications:   •  acetaminophen (TYLENOL) tablet 650 mg, 650 mg, Oral, Q4H PRN, 650 mg at 12/05/20 1320 **OR** acetaminophen (TYLENOL) 160 MG/5ML solution 650 mg, 650 mg, Oral, Q4H PRN **OR** acetaminophen (TYLENOL) suppository 650 mg, 650 mg, Rectal, Q4H PRN, Gene Lui MD  •  atorvastatin (LIPITOR) tablet 10 mg, 10 mg, Oral, Nightly, Gene Lui MD, 10 mg at 12/04/20 2041  •  AZITHROMYCIN 500 MG/250 ML 0.9% NS IVPB (vial-mate), 500 mg, Intravenous, Q24H, Tressa Trevino MD, 500 mg at 12/04/20 1829  •  benzonatate (TESSALON) capsule 200 mg, 200 mg, Oral, Q4H PRN, Gene Lui MD, 200 mg at 12/03/20 2608  •  cefTRIAXone (ROCEPHIN) 1 g/100 mL 0.9% NS (MBP), 1 g, " Intravenous, Q24H, Tressa Trevino MD, 1 g at 12/04/20 1736  •  cholecalciferol (VITAMIN D3) tablet 1,000 Units, 1,000 Units, Oral, Daily, Gene Lui MD, 1,000 Units at 12/05/20 0801  •  dexamethasone (DECADRON) tablet 6 mg, 6 mg, Oral, Daily With Breakfast, 6 mg at 12/05/20 0753 **OR** dexamethasone (DECADRON) injection 6 mg, 6 mg, Intravenous, Daily With Breakfast, Gene Lui MD  •  dextromethorphan polistirex ER (DELSYM) 30 MG/5ML oral suspension 60 mg, 10 mL, Oral, Q12H PRN, Gene Lui MD  •  enoxaparin (LOVENOX) syringe 40 mg, 40 mg, Subcutaneous, Q24H, Dav Fowler DO, 40 mg at 12/05/20 0801  •  famotidine (PEPCID) tablet 20 mg, 20 mg, Oral, BID, Gene Lui MD, 20 mg at 12/05/20 0801  •  ketorolac (TORADOL) injection 15 mg, 15 mg, Intravenous, Q6H PRN, Dav Fowler DO, 15 mg at 12/05/20 0751  •  lisinopril (PRINIVIL,ZESTRIL) tablet 20 mg, 20 mg, Oral, Daily, Dav Fowler DO, 20 mg at 12/05/20 0801  •  melatonin tablet 6 mg, 6 mg, Oral, Nightly, Dav Fowler DO, 6 mg at 12/04/20 2041  •  metoprolol tartrate (LOPRESSOR) tablet 25 mg, 25 mg, Oral, Q12H, Dav Fowler DO, 25 mg at 12/05/20 0801  •  ondansetron (ZOFRAN) tablet 4 mg, 4 mg, Oral, Q6H PRN **OR** ondansetron (ZOFRAN) injection 4 mg, 4 mg, Intravenous, Q6H PRN, Gene Lui MD  •  Pharmacy Consult - Remdesivir, , Does not apply, Continuous PRN, Gene Lui MD  •  [COMPLETED] remdesivir 200 mg in sodium chloride 0.9 % 250 mL IVPB (powder vial), 200 mg, Intravenous, Q24H, 200 mg at 12/03/20 0048 **FOLLOWED BY** remdesivir 100 mg in sodium chloride 0.9 % 250 mL IVPB (powder vial), 100 mg, Intravenous, Q24H, Gene Lui MD, 100 mg at 12/04/20 2300  •  [COMPLETED] Insert peripheral IV, , , Once **AND** sodium chloride 0.9 % flush 10 mL, 10 mL, Intravenous, PRN, Gene Lui MD  •  sodium chloride 0.9 % flush 10 mL, 10 mL, Intravenous, Q12H, Gene Lui MD, 10 mL  "at 20 08  •  sodium chloride 0.9 % flush 10 mL, 10 mL, Intravenous, PRN, Gene Lui MD  •  technetium sestamibi (CARDIOLITE) injection 1 dose, 1 dose, Intravenous, Once in imaging, Dav Fowler F, DO  •  vitamin C (ASCORBIC ACID) tablet 500 mg, 500 mg, Oral, Daily, Gene Lui MD, 500 mg at 20 08  •  zinc sulfate (ZINCATE) capsule 220 mg, 220 mg, Oral, Daily, Gene Lui MD, 220 mg at 20 08      Review of Systems   Unable to perform ROS: Acuity of condition       Objective     Vital Signs:  Temp (24hrs), Av.7 °F (36.5 °C), Min:97.5 °F (36.4 °C), Max:97.9 °F (36.6 °C)      /85   Pulse 104   Temp 97.9 °F (36.6 °C) (Axillary)   Resp 19   Ht 170.2 cm (67\")   Wt 109 kg (240 lb 6.4 oz)   SpO2 (!) 87%   BMI 37.65 kg/m²         Physical Exam     General: The patient is an obese female lying in bed appearing comfortable sitting up in the bed on her phone.  She answered her cell phone without difficulty.  HEENT: Vapotherm in place.   Respiratory: Effort appeared even and unlabored.  She was not conversationally dyspneic.  Neuro: Patient alert, oriented, speech clear  Psych: Pleasant and cooperative    Results Review:    I reviewed the patient's new clinical results.    Lab Results:  CBC:   Lab Results   Lab 20  1615 20  05   WBC 6.36 3.48 7.29   HEMOGLOBIN 13.5 11.7* 13.1   HEMATOCRIT 41.4 37.1 40.9   PLATELETS 294 258 306         CMP:   Lab Results   Lab 20  0448 20  0553 20  0223   SODIUM 139 140 142   POTASSIUM 4.2 4.1 3.9   CHLORIDE 100 101 104   CO2 27.0 29.0 28.0   BUN 42* 40* 42*   CREATININE 0.77 0.66 0.58   CALCIUM 9.4 9.7 9.4   BILIRUBIN 0.4 0.5 0.4   ALK PHOS 95 100 94   ALT (SGPT) 14 14 12   AST (SGOT) 31 29 22   GLUCOSE 132* 115* 121*         Culture Results:        Microbiology Results Abnormal     Procedure Component Value - Date/Time    Urine Culture - Urine, Urine, Clean Catch [[id number]]  " (Abnormal) Collected: 12/03/20 1447    Lab Status: Preliminary result Specimen: Urine, Clean Catch Updated: 12/05/20 1254     Urine Culture >100,000 CFU/mL Gram Negative Bacilli    Blood Culture - Blood, Arm, Left [[id number]] Collected: 12/02/20 1615    Lab Status: Preliminary result Specimen: Blood from Arm, Left Updated: 12/04/20 1700     Blood Culture No growth at 2 days    Blood Culture - Blood, Arm, Left [[id number]] Collected: 12/02/20 1618    Lab Status: Preliminary result Specimen: Blood from Arm, Left Updated: 12/04/20 1700     Blood Culture No growth at 2 days    Legionella Antigen, Urine - Urine, Urine, Clean Catch [[id number]]  (Normal) Collected: 12/03/20 1447    Lab Status: Final result Specimen: Urine, Clean Catch Updated: 12/03/20 1535     LEGIONELLA ANTIGEN, URINE Negative    S. Pneumo Ag Urine or CSF - Urine, Urine, Clean Catch [[id number]]  (Normal) Collected: 12/03/20 1447    Lab Status: Final result Specimen: Urine, Clean Catch Updated: 12/03/20 1534     Strep Pneumo Ag Negative    Respiratory Panel, PCR (WITHOUT COVID) - Swab, Nasopharynx [[id number]]  (Normal) Collected: 12/03/20 1234    Lab Status: Final result Specimen: Swab from Nasopharynx Updated: 12/03/20 1351     ADENOVIRUS, PCR Not Detected     Coronavirus 229E Not Detected     Coronavirus HKU1 Not Detected     Coronavirus NL63 Not Detected     Coronavirus OC43 Not Detected     Human Metapneumovirus Not Detected     Human Rhinovirus/Enterovirus Not Detected     Influenza B PCR Not Detected     Parainfluenza Virus 1 Not Detected     Parainfluenza Virus 2 Not Detected     Parainfluenza Virus 3 Not Detected     Parainfluenza Virus 4 Not Detected     Bordetella pertussis pcr Not Detected     Influenza A H1 2009 PCR Not Detected     Chlamydophila pneumoniae PCR Not Detected     Mycoplasma pneumo by PCR Not Detected     Influenza A PCR Not Detected     Influenza A H3 Not Detected     Influenza A H1 Not Detected     RSV, PCR Not Detected      Bordetella parapertussis PCR Not Detected    Narrative:      The coronavirus on the RVP is NOT COVID-19 and is NOT indicative of infection with COVID-19.     COVID PRE-OP / PRE-PROCEDURE SCREENING ORDER (NO ISOLATION) - Swab, Nasopharynx [[id number]]  (Abnormal) Collected: 12/02/20 1728    Lab Status: Final result Specimen: Swab from Nasopharynx Updated: 12/02/20 1825    Narrative:      The following orders were created for panel order COVID PRE-OP / PRE-PROCEDURE SCREENING ORDER (NO ISOLATION) - Swab, Nasopharynx.  Procedure                               Abnormality         Status                     ---------                               -----------         ------                     COVID-19, ABBOTT IN-HOUS...[[id number]]  Abnormal            Final result                 Please view results for these tests on the individual orders.    COVID-19, ABBOTT IN-HOUSE,NP Swab (NO TRANSPORT MEDIA) 2 HR TAT - Swab, Nasopharynx [[id number]]  (Abnormal) Collected: 12/02/20 1728    Lab Status: Final result Specimen: Swab from Nasopharynx Updated: 12/02/20 1825     COVID19 Detected    Narrative:      Fact sheet for providers: https://www.fda.gov/media/037780/download     Fact sheet for patients: https://www.fda.gov/media/714693/download                Radiology:   Imaging Results (Last 72 Hours)     Procedure Component Value Units Date/Time    NM Lung Scan Perfusion Particulate [[id number]] Collected: 12/03/20 0852     Updated: 12/03/20 0857    Narrative:      EXAMINATION:  NM LUNG SCAN PERFUSION PARTICULATE-  12/3/2020 8:16 AM CST     HISTORY: Chest pain, acute, PE suspected, intermed prob, negative  D-dimer; U07.1-COVID-19.      COMPARISON: Chest x-ray on 12/2/2020.     TECHNIQUE: The patient was injected with 5.1 mCi of technetium 99m MAA  intravenously. Multiple projection imaging was then performed.     FINDINGS: There is a single small peripheral perfusion defect in the  right lung base laterally on the posterior image. The  patient has an  elevated right hemidiaphragm on x-ray. This is likely related.       Impression:      Low probability of pulmonary embolus.  This report was finalized on 12/03/2020 08:54 by Dr. Isiah Benitez MD.    XR Chest 1 View [[id number]] Collected: 12/02/20 1636     Updated: 12/02/20 1640    Narrative:      EXAMINATION:  XR CHEST 1 VW-  12/2/2020 4:25 PM CST     HISTORY: Shortness of breath.     COMPARISON: 5/30/2012.     FINDINGS:  There is hypoventilation. There are bilateral infiltrates.  There is elevation/eventration of the right hemidiaphragm. There is  borderline cardiomegaly. No significant pleural effusion is seen.       Impression:      1. Hypoventilation.  2. Bilateral infiltrates may be infectious or inflammatory. Pulmonary  edema also possible.  3. Borderline cardiomegaly.        This report was finalized on 12/02/2020 16:37 by Dr. Isiah Benitez MD.          Assessment/Plan     Active Hospital Problems    Diagnosis   • **Pneumonia due to COVID-19 virus   • RITA (acute kidney injury) (CMS/HCC)   • COVID-19   • Acute respiratory failure with hypoxia (CMS/HCC)   • Benign essential hypertension   • Common variable immunodeficiency (CMS/HCC)       IMPRESSION:  COVID-19 infection with pneumonia and acute respiratory failure-status post convalescent plasma.  On remdesivir.  Acute kidney injury-resolved  Elevated CRP  Elevated ferritin  Elevated D-dimer elevated LDH  Pyuria with few squamous epithelial cells-urine culture in progress.  History of common variable immunodeficiency but no treatment in the last 20 years    RECOMMENDATION:   · Continue empiric ceftriaxone  · We will discontinue azithromycin as treated with that as an outpatient prior to coming in.    · Continue ceftriaxone for urinary tract infection pending urine culture results  · Continue dexamethasone  ·  continue remdesivir  · Continue anticoagulation per hospitalist  · Continue adjuvant therapy per hospitalist  · Continue maximal oxygen  support.  · Encourage frequent change of positions and proning as much as possible    Discussed with JEAN Lee. MD Uriel  12/05/20  14:56 CST        Electronically signed by Tressa Trevino MD at 12/05/20 1520       Consult Notes (last 24 hours) (Notes from 12/05/20 1415 through 12/06/20 1415)    No notes of this type exist for this encounter.

## 2020-12-07 NOTE — PROGRESS NOTES
Discharge Planning Assessment  [facility]     Patient Name: Radha Smiley  MRN: [id number]  Today's Date: 12/7/2020    Admit Date: 12/2/2020    Discharge Needs Assessment     Row Name 12/07/20 0958       Living Environment    Lives With  spouse    Current Living Arrangements  home/apartment/condo    Primary Care Provided by  self    Provides Primary Care For  no one    Family Caregiver if Needed  spouse    Quality of Family Relationships  supportive    Able to Return to Prior Arrangements  yes       Resource/Environmental Concerns    Resource/Environmental Concerns  none    Transportation Concerns  car, none       Transition Planning    Patient/Family Anticipates Transition to  home    Patient/Family Anticipated Services at Transition  none    Transportation Anticipated  family or friend will provide       Discharge Needs Assessment    Readmission Within the Last 30 Days  no previous admission in last 30 days    Equipment Currently Used at Home  oxygen    Discharge Coordination/Progress  PT resides at home with spouse and was independent working outside the home prior to testing positive for COVID. Will follow and assist as needed.        Discharge Plan    No documentation.       Continued Care and Services - Admitted Since 12/2/2020    Coordination has not been started for this encounter.         Demographic Summary    No documentation.       Functional Status    No documentation.       Psychosocial    No documentation.       Abuse/Neglect    No documentation.       Legal    No documentation.       Substance Abuse    No documentation.       Patient Forms    No documentation.           EFRAIN Miramontes

## 2020-12-07 NOTE — PROGRESS NOTES
INFECTIOUS DISEASES PROGRESS NOTE    Patient:  Radha Smiley  YOB: 1965  MRN: [id number]   Admit date: 12/2/2020   Admitting Physician: Johnnie Zacarias*  Primary Care Physician: Adrien De Leon MD    Chief Complaint:  SOA      Interval History: Patient tolerated BiPAP better through the night.  She told me her sats were 98% and she was very happy about that.  She had some trouble going to Vapotherm with the nonrebreather earlier this morning, she tried again around 9 AM and tolerated that.  Respiratory therapy is at bedside and they are trying to give her a break from BiPAP again.        Allergies:   Allergies   Allergen Reactions   • Demerol [Meperidine] Rash       Current Meds:     Current Facility-Administered Medications:   •  acetaminophen (TYLENOL) tablet 650 mg, 650 mg, Oral, Q4H PRN, 650 mg at 12/05/20 1320 **OR** acetaminophen (TYLENOL) 160 MG/5ML solution 650 mg, 650 mg, Oral, Q4H PRN **OR** acetaminophen (TYLENOL) suppository 650 mg, 650 mg, Rectal, Q4H PRN, Gene Lui MD  •  atorvastatin (LIPITOR) tablet 10 mg, 10 mg, Oral, Nightly, Gene Lui MD, 10 mg at 12/06/20 1947  •  benzonatate (TESSALON) capsule 200 mg, 200 mg, Oral, Q4H PRN, Gene Lui MD, 200 mg at 12/05/20 2016  •  cefTRIAXone (ROCEPHIN) 1 g/100 mL 0.9% NS (MBP), 1 g, Intravenous, Q24H, Tressa Trevino MD, 1 g at 12/06/20 1818  •  cholecalciferol (VITAMIN D3) tablet 1,000 Units, 1,000 Units, Oral, Daily, Gene Lui MD, 1,000 Units at 12/07/20 0919  •  cloNIDine (CATAPRES) tablet 0.1 mg, 0.1 mg, Oral, BID, Leidy Joe DO, 0.1 mg at 12/07/20 0919  •  dexamethasone (DECADRON) tablet 6 mg, 6 mg, Oral, Daily With Breakfast, 6 mg at 12/07/20 0919 **OR** dexamethasone (DECADRON) injection 6 mg, 6 mg, Intravenous, Daily With Breakfast, Gene Lui MD  •  dextromethorphan polistirex ER (DELSYM) 30 MG/5ML oral suspension 60 mg, 10 mL, Oral, Q12H PRN, Gene Lui  "MD ANTONIA  •  enoxaparin (LOVENOX) syringe 40 mg, 40 mg, Subcutaneous, Q24H, Dav Fowler DO, 40 mg at 20  •  famotidine (PEPCID) tablet 20 mg, 20 mg, Oral, BID, Gene Lui MD, 20 mg at 20  •  ketorolac (TORADOL) injection 15 mg, 15 mg, Intravenous, Q6H PRN, Dav Fowler DO, 15 mg at 20  •  labetalol (NORMODYNE,TRANDATE) injection 10 mg, 10 mg, Intravenous, Q6H PRN, Dav Fowler DO, 10 mg at 20  •  lisinopril (PRINIVIL,ZESTRIL) tablet 20 mg, 20 mg, Oral, Q12H, Dav Fowler DO, 20 mg at 20  •  melatonin tablet 6 mg, 6 mg, Oral, Nightly, Dav Fowler DO, 6 mg at 20  •  metoprolol tartrate (LOPRESSOR) tablet 25 mg, 25 mg, Oral, Q12H, Dav Fowler DO, 25 mg at 20  •  ondansetron (ZOFRAN) tablet 4 mg, 4 mg, Oral, Q6H PRN **OR** ondansetron (ZOFRAN) injection 4 mg, 4 mg, Intravenous, Q6H PRN, Gene Lui MD  •  [COMPLETED] Insert peripheral IV, , , Once **AND** sodium chloride 0.9 % flush 10 mL, 10 mL, Intravenous, PRN, Gene Lui MD  •  sodium chloride 0.9 % flush 10 mL, 10 mL, Intravenous, Q12H, Gene Lui MD, 10 mL at 20  •  sodium chloride 0.9 % flush 10 mL, 10 mL, Intravenous, PRN, Gene Lui MD  •  technetium sestamibi (CARDIOLITE) injection 1 dose, 1 dose, Intravenous, Once in imaging, Janeth, Dav F, DO  •  vitamin C (ASCORBIC ACID) tablet 500 mg, 500 mg, Oral, Daily, Gene Lui MD, 500 mg at 20  •  zinc sulfate (ZINCATE) capsule 220 mg, 220 mg, Oral, Daily, Gene Lui MD, 220 mg at 20      Review of Systems   Constitutional: Negative for fever.   Respiratory: Positive for shortness of breath.        Objective     Vital Signs:  Temp (24hrs), Av °F (36.1 °C), Min:96.6 °F (35.9 °C), Max:97.9 °F (36.6 °C)      /74   Pulse 96   Temp 97.9 °F (36.6 °C) (Axillary)   Resp 24   Ht 170.2 cm (67\")   Wt 109 kg (240 lb " 14.4 oz)   SpO2 99%   BMI 37.73 kg/m²         Physical Exam     General: The patient is lying in bed appearing comfortable.  Respiratory therapy is bedside.  She is just been placed on Vapotherm and facemask.  HEENT: Vapotherm in place.  Nonrebreather facemask in place  Respiratory: Effort appeared even and unlabored however she was not very conversive.  Neuro: Patient alert, oriented, speech clear.  She let me know that her saturations were 98% earlier and held up her fingers.  Psych: Pleasant and cooperative     Results Review:    I reviewed the patient's new clinical results.    Lab Results:  CBC:   Lab Results   Lab 12/02/20  1615 12/03/20  0448 12/04/20  0553 12/07/20  0312   WBC 6.36 3.48 7.29 10.38   HEMOGLOBIN 13.5 11.7* 13.1 12.1   HEMATOCRIT 41.4 37.1 40.9 37.8   PLATELETS 294 258 306 212         CMP:   Lab Results   Lab 12/05/20  0223 12/06/20  0321 12/07/20  0312   SODIUM 142 142 142   POTASSIUM 3.9 3.8 4.2   CHLORIDE 104 105 107   CO2 28.0 27.0 26.0   BUN 42* 34* 27*   CREATININE 0.58 0.57 0.52*   CALCIUM 9.4 9.1 9.2   BILIRUBIN 0.4 0.6 0.5   ALK PHOS 94 113 109   ALT (SGPT) 12 14 14   AST (SGOT) 22 35* 24   GLUCOSE 121* 118* 108*       Range & Units 10:35 04:35 3d ago   Site  Right Brachial  Left Radial  Right Radial    Jairo's Test  N/A  Positive  Positive    pH, Arterial   7.350 - 7.450 pH units 7.452High   7.457High  CM  7.477High  CM    Comment: 83 Value above reference range   pCO2, Arterial   35.0 - 45.0 mm Hg 38.7  38.5  40.9    pO2, Arterial   83.0 - 108.0 mm Hg 52.4Low Critical   60.3Low  CM  44.0Low Critical  CM    Comment: 85 Value below critical limit   HCO3, Arterial   20.0 - 26.0 mmol/L 27.0High   27.2High  CM  30.2High  CM    Comment: 83 Value above reference range   Base Excess, Arterial   0.0 - 2.0 mmol/L 2.9High   3.2High  CM  6.0High  CM    Comment: 83 Value above reference range   O2 Saturation, Arterial   94.0 - 99.0 % 89.8Low   92.9Low  CM  82.7Low  CM    Comment: 84 Value below  reference range        >>>>>> was on  vapotherm alone with ABG      Culture Results:        Microbiology Results Abnormal     Procedure Component Value - Date/Time    MRSA Screen, PCR (Inpatient) - Swab, Nares [[id number]]  (Abnormal) Collected: 12/06/20 2158    Lab Status: Final result Specimen: Swab from Nares Updated: 12/06/20 2335     MRSA PCR MRSA Detected    Blood Culture - Blood, Arm, Left [[id number]] Collected: 12/02/20 1615    Lab Status: Preliminary result Specimen: Blood from Arm, Left Updated: 12/06/20 1700     Blood Culture No growth at 4 days    Blood Culture - Blood, Arm, Left [[id number]] Collected: 12/02/20 1618    Lab Status: Preliminary result Specimen: Blood from Arm, Left Updated: 12/06/20 1700     Blood Culture No growth at 4 days    Urine Culture - Urine, Urine, Clean Catch [[id number]]  (Abnormal)  (Susceptibility) Collected: 12/03/20 1447    Lab Status: Final result Specimen: Urine, Clean Catch Updated: 12/06/20 0934     Urine Culture >100,000 CFU/mL Klebsiella pneumoniae ssp pneumoniae    Susceptibility      Klebsiella pneumoniae ssp pneumoniae     ARACELI     Ampicillin Resistant     Ampicillin + Sulbactam Susceptible     Cefazolin Susceptible     Cefepime Susceptible     Ceftazidime Susceptible     Ceftriaxone Susceptible     Gentamicin Susceptible     Levofloxacin Susceptible     Nitrofurantoin Intermediate     Piperacillin + Tazobactam Susceptible     Tetracycline Susceptible     Trimethoprim + Sulfamethoxazole Susceptible                    Legionella Antigen, Urine - Urine, Urine, Clean Catch [[id number]]  (Normal) Collected: 12/03/20 1447    Lab Status: Final result Specimen: Urine, Clean Catch Updated: 12/03/20 1535     LEGIONELLA ANTIGEN, URINE Negative    S. Pneumo Ag Urine or CSF - Urine, Urine, Clean Catch [[id number]]  (Normal) Collected: 12/03/20 1447    Lab Status: Final result Specimen: Urine, Clean Catch Updated: 12/03/20 1534     Strep Pneumo Ag Negative    Respiratory Panel, PCR  (WITHOUT COVID) - Swab, Nasopharynx [[id number]]  (Normal) Collected: 12/03/20 1234    Lab Status: Final result Specimen: Swab from Nasopharynx Updated: 12/03/20 1351     ADENOVIRUS, PCR Not Detected     Coronavirus 229E Not Detected     Coronavirus HKU1 Not Detected     Coronavirus NL63 Not Detected     Coronavirus OC43 Not Detected     Human Metapneumovirus Not Detected     Human Rhinovirus/Enterovirus Not Detected     Influenza B PCR Not Detected     Parainfluenza Virus 1 Not Detected     Parainfluenza Virus 2 Not Detected     Parainfluenza Virus 3 Not Detected     Parainfluenza Virus 4 Not Detected     Bordetella pertussis pcr Not Detected     Influenza A H1 2009 PCR Not Detected     Chlamydophila pneumoniae PCR Not Detected     Mycoplasma pneumo by PCR Not Detected     Influenza A PCR Not Detected     Influenza A H3 Not Detected     Influenza A H1 Not Detected     RSV, PCR Not Detected     Bordetella parapertussis PCR Not Detected    Narrative:      The coronavirus on the RVP is NOT COVID-19 and is NOT indicative of infection with COVID-19.     COVID PRE-OP / PRE-PROCEDURE SCREENING ORDER (NO ISOLATION) - Swab, Nasopharynx [[id number]]  (Abnormal) Collected: 12/02/20 1728    Lab Status: Final result Specimen: Swab from Nasopharynx Updated: 12/02/20 1825    Narrative:      The following orders were created for panel order COVID PRE-OP / PRE-PROCEDURE SCREENING ORDER (NO ISOLATION) - Swab, Nasopharynx.  Procedure                               Abnormality         Status                     ---------                               -----------         ------                     COVID-19, ABBOTT IN-HOUS...[[id number]]  Abnormal            Final result                 Please view results for these tests on the individual orders.    COVID-19, ABBOTT IN-HOUSE,NP Swab (NO TRANSPORT MEDIA) 2 HR TAT - Swab, Nasopharynx [[id number]]  (Abnormal) Collected: 12/02/20 1728    Lab Status: Final result Specimen: Swab from Nasopharynx  Updated: 12/02/20 1825     COVID19 Detected    Narrative:      Fact sheet for providers: https://www.fda.gov/media/442817/download     Fact sheet for patients: https://www.fda.gov/media/412126/download                Radiology:   Imaging Results (Last 72 Hours)     ** No results found for the last 72 hours. **          Assessment/Plan     Active Hospital Problems    Diagnosis   • **Pneumonia due to COVID-19 virus   • RITA (acute kidney injury) (CMS/HCC)   • COVID-19   • Acute respiratory failure with hypoxia (CMS/[facility])   • Benign essential hypertension   • Common variable immunodeficiency (CMS/[facility])       IMPRESSION:  COVID-19 infection with pneumonia and acute respiratory failure-status post convalescent plasma/ remdesivir.  Acute kidney injury-resolved  Elevated CRP  Elevated ferritin  Elevated D-dimer elevated LDH  Pyuria with few squamous epithelial cells Klebsiella called..  History of common variable immunodeficiency but no treatment in the last 20 years  MRSA nasal screen positive    RECOMMENDATION:   · Continue ceftriaxone  ·  Continue ceftriaxone for urinary tract infection   · Continue dexamethasone  ·  Continue anticoagulation per hospitalist  · Continue adjuvant therapy per hospitalist  · ContinueMaximal oxygen support.  · Reviewed proning again with patient especially as a means to hopefully stave off intubation  · If any change in condition, fever, concern for bacterial process, add vancomycin given MRSA nasal screen positive.    Discussed with JEAN Fischer  Discussed with respiratory therapy  Tressa Trevino MD  12/07/20  12:21 CST

## 2020-12-08 NOTE — PAYOR COMM NOTE
"AUTH: [id number]    Rashad Smiley (55 y.o. Female)     Date of Birth Social Security Number Address Home Phone MRN    1965  [id number] [address] [phone] [id number]    Jewish Marital Status          Other        Admission Date Admission Type Admitting Provider Attending Provider Department, Room/Bed    12/2/20 Emergency Johnnie Zacarias MD [place], Glenn Riego, MD [facility] INTENSIVE CARE, I016/1    Discharge Date Discharge Disposition Discharge Destination                       Attending Provider: Johnnie Zacarias MD    Allergies: Demerol [Meperidine]    Isolation: Enh Drop/Con   Infection: COVID (confirmed) (12/02/20), MRSA (12/06/20)   Code Status: CPR    Ht: 170.2 cm (67\")   Wt: 109 kg (239 lb 4.8 oz)    Admission Cmt: None   Principal Problem: Pneumonia due to COVID-19 virus [U07.1,J12.89]                 Active Insurance as of 12/2/2020     Primary Coverage     Payor Plan Insurance Group Employer/Plan Group    [employer] [facility] [employer] PPO [id number]     Payor Plan Address Payor Plan Phone Number Payor Plan Fax Number Effective Dates    [address] [phone]  11/1/2020 - None Entered    [place] [address]       Subscriber Name Subscriber Birth Date Member ID       RASHAD SMILEY 1965 [id number]                 Emergency Contacts      (Rel.) Home Phone Work Phone Mobile Phone    ROMALETTY SHAIKH (Spouse) -- -- [phone]    FLOR HER (Daughter) -- -- [phone]               Physician Progress Notes (last 24 hours) (Notes from 12/07/20 1107 through 12/08/20 1107)      Tressa Trevino MD at 12/08/20 0932          INFECTIOUS DISEASES PROGRESS NOTE    Patient:  Rashad Smiley  YOB: 1965  MRN: [id number]   Admit date: 12/2/2020   Admitting Physician: Johnnie Zacarias*  Primary Care Physician: Adrien De Leon MD    Chief Complaint:  " SOA      Interval History: Reviewed with Aaliyah RN as she had the patient yesterday.  She reports the patient did very well with Vapotherm when she sat up in a chair.    Patient is currently in bed on BiPAP but JEAN Rubin plans to get her up to the chair today.        Allergies:   Allergies   Allergen Reactions   • Demerol [Meperidine] Rash       Current Meds:     Current Facility-Administered Medications:   •  acetaminophen (TYLENOL) tablet 650 mg, 650 mg, Oral, Q4H PRN, 650 mg at 12/05/20 1320 **OR** acetaminophen (TYLENOL) 160 MG/5ML solution 650 mg, 650 mg, Oral, Q4H PRN **OR** acetaminophen (TYLENOL) suppository 650 mg, 650 mg, Rectal, Q4H PRN, Gene Lui MD  •  atorvastatin (LIPITOR) tablet 10 mg, 10 mg, Oral, Nightly, Gene Lui MD, 10 mg at 12/07/20 2018  •  benzonatate (TESSALON) capsule 200 mg, 200 mg, Oral, Q4H PRN, Gene Lui MD, 200 mg at 12/05/20 2016  •  cefTRIAXone (ROCEPHIN) 1 g/100 mL 0.9% NS (MBP), 1 g, Intravenous, Q24H, Tressa Trevino MD, 1 g at 12/07/20 1826  •  cholecalciferol (VITAMIN D3) tablet 1,000 Units, 1,000 Units, Oral, Daily, Gene Lui MD, 1,000 Units at 12/08/20 0751  •  cloNIDine (CATAPRES) tablet 0.1 mg, 0.1 mg, Oral, BID, Leidy Joe DO, 0.1 mg at 12/08/20 0753  •  dexamethasone (DECADRON) tablet 6 mg, 6 mg, Oral, Daily With Breakfast, 6 mg at 12/08/20 0750 **OR** dexamethasone (DECADRON) injection 6 mg, 6 mg, Intravenous, Daily With Breakfast, Gene Lui MD  •  dextromethorphan polistirex ER (DELSYM) 30 MG/5ML oral suspension 60 mg, 10 mL, Oral, Q12H PRN, Gene Lui MD  •  enoxaparin (LOVENOX) syringe 40 mg, 40 mg, Subcutaneous, Q24H, Dav Fowler DO, 40 mg at 12/08/20 0754  •  famotidine (PEPCID) tablet 20 mg, 20 mg, Oral, BID, Gene Lui MD, 20 mg at 12/08/20 0753  •  ketorolac (TORADOL) injection 15 mg, 15 mg, Intravenous, Q6H PRN, Dav Fowler DO, 15 mg at 12/08/20 0652  •  labetalol  "(NORMODYNE,TRANDATE) injection 10 mg, 10 mg, Intravenous, Q6H PRN, Dav Fowler DO, 10 mg at 20 0611  •  lisinopril (PRINIVIL,ZESTRIL) tablet 20 mg, 20 mg, Oral, Q12H, Dav Fowler DO, 20 mg at 20 0752  •  melatonin tablet 6 mg, 6 mg, Oral, Nightly, Dav Fowler DO, 6 mg at 20  •  metoprolol tartrate (LOPRESSOR) tablet 25 mg, 25 mg, Oral, Q12H, Dav Fowler DO, 25 mg at 20 075  •  ondansetron (ZOFRAN) tablet 4 mg, 4 mg, Oral, Q6H PRN **OR** ondansetron (ZOFRAN) injection 4 mg, 4 mg, Intravenous, Q6H PRN, Gene Lui MD  •  [COMPLETED] Insert peripheral IV, , , Once **AND** sodium chloride 0.9 % flush 10 mL, 10 mL, Intravenous, PRN, Gene Lui MD  •  sodium chloride 0.9 % flush 10 mL, 10 mL, Intravenous, Q12H, Gene Lui MD, 10 mL at 20 0755  •  sodium chloride 0.9 % flush 10 mL, 10 mL, Intravenous, PRN, Gene Lui MD  •  technetium sestamibi (CARDIOLITE) injection 1 dose, 1 dose, Intravenous, Once in imaging, Dav Fowler DO  •  vitamin C (ASCORBIC ACID) tablet 500 mg, 500 mg, Oral, Daily, Geen Lui MD, 500 mg at 20 0753  •  zinc sulfate (ZINCATE) capsule 220 mg, 220 mg, Oral, Daily, Gene Lui MD, 220 mg at 20 0751      Review of Systems   Unable to perform ROS: Acuity of condition       Objective     Vital Signs:  Temp (24hrs), Av.5 °F (36.4 °C), Min:96.6 °F (35.9 °C), Max:98.3 °F (36.8 °C)      /75   Pulse 90   Temp 98 °F (36.7 °C) (Axillary)   Resp 21   Ht 170.2 cm (67\")   Wt 109 kg (239 lb 4.8 oz)   SpO2 90%   BMI 37.48 kg/m²         Physical Exam     General: The patient is sitting up in bed lying on her back with BiPAP in place appearing comfortable..  HEENT: BiPAP in place.    Respiratory: Effort appeared even and unlabored   Neuro: Reported to be alert and oriented  Psych: Reported to be cooperative with staff.  Results Review:    I reviewed the patient's new clinical " results.    Lab Results:  CBC:   Lab Results   Lab 12/02/20  1615 12/03/20  0448 12/04/20  0553 12/07/20  0312   WBC 6.36 3.48 7.29 10.38   HEMOGLOBIN 13.5 11.7* 13.1 12.1   HEMATOCRIT 41.4 37.1 40.9 37.8   PLATELETS 294 258 306 212         CMP:   Lab Results   Lab 12/06/20  0321 12/07/20  0312 12/08/20  0620   SODIUM 142 142 139   POTASSIUM 3.8 4.2 4.0   CHLORIDE 105 107 103   CO2 27.0 26.0 25.0   BUN 34* 27* 31*   CREATININE 0.57 0.52* 0.50*   CALCIUM 9.1 9.2 9.3   BILIRUBIN 0.6 0.5 0.5   ALK PHOS 113 109 112   ALT (SGPT) 14 14 12   AST (SGOT) 35* 24 21   GLUCOSE 118* 108* 116*         Culture Results:        Microbiology Results Abnormal     Procedure Component Value - Date/Time    Blood Culture - Blood, Arm, Left [[id number]] Collected: 12/02/20 1615    Lab Status: Final result Specimen: Blood from Arm, Left Updated: 12/07/20 1700     Blood Culture No growth at 5 days    Blood Culture - Blood, Arm, Left [[id number]] Collected: 12/02/20 1618    Lab Status: Final result Specimen: Blood from Arm, Left Updated: 12/07/20 1700     Blood Culture No growth at 5 days    MRSA Screen, PCR (Inpatient) - Swab, Nares [[id number]]  (Abnormal) Collected: 12/06/20 2158    Lab Status: Final result Specimen: Swab from Nares Updated: 12/06/20 2335     MRSA PCR MRSA Detected    Urine Culture - Urine, Urine, Clean Catch [[id number]]  (Abnormal)  (Susceptibility) Collected: 12/03/20 1447    Lab Status: Final result Specimen: Urine, Clean Catch Updated: 12/06/20 0934     Urine Culture >100,000 CFU/mL Klebsiella pneumoniae ssp pneumoniae    Susceptibility      Klebsiella pneumoniae ssp pneumoniae     ARACELI     Ampicillin Resistant     Ampicillin + Sulbactam Susceptible     Cefazolin Susceptible     Cefepime Susceptible     Ceftazidime Susceptible     Ceftriaxone Susceptible     Gentamicin Susceptible     Levofloxacin Susceptible     Nitrofurantoin Intermediate     Piperacillin + Tazobactam Susceptible     Tetracycline Susceptible      Trimethoprim + Sulfamethoxazole Susceptible                    Legionella Antigen, Urine - Urine, Urine, Clean Catch [[id number]]  (Normal) Collected: 12/03/20 1447    Lab Status: Final result Specimen: Urine, Clean Catch Updated: 12/03/20 1535     LEGIONELLA ANTIGEN, URINE Negative    S. Pneumo Ag Urine or CSF - Urine, Urine, Clean Catch [[id number]]  (Normal) Collected: 12/03/20 1447    Lab Status: Final result Specimen: Urine, Clean Catch Updated: 12/03/20 1534     Strep Pneumo Ag Negative    Respiratory Panel, PCR (WITHOUT COVID) - Swab, Nasopharynx [[id number]]  (Normal) Collected: 12/03/20 1234    Lab Status: Final result Specimen: Swab from Nasopharynx Updated: 12/03/20 1351     ADENOVIRUS, PCR Not Detected     Coronavirus 229E Not Detected     Coronavirus HKU1 Not Detected     Coronavirus NL63 Not Detected     Coronavirus OC43 Not Detected     Human Metapneumovirus Not Detected     Human Rhinovirus/Enterovirus Not Detected     Influenza B PCR Not Detected     Parainfluenza Virus 1 Not Detected     Parainfluenza Virus 2 Not Detected     Parainfluenza Virus 3 Not Detected     Parainfluenza Virus 4 Not Detected     Bordetella pertussis pcr Not Detected     Influenza A H1 2009 PCR Not Detected     Chlamydophila pneumoniae PCR Not Detected     Mycoplasma pneumo by PCR Not Detected     Influenza A PCR Not Detected     Influenza A H3 Not Detected     Influenza A H1 Not Detected     RSV, PCR Not Detected     Bordetella parapertussis PCR Not Detected    Narrative:      The coronavirus on the RVP is NOT COVID-19 and is NOT indicative of infection with COVID-19.     COVID PRE-OP / PRE-PROCEDURE SCREENING ORDER (NO ISOLATION) - Swab, Nasopharynx [[id number]]  (Abnormal) Collected: 12/02/20 1728    Lab Status: Final result Specimen: Swab from Nasopharynx Updated: 12/02/20 1825    Narrative:      The following orders were created for panel order COVID PRE-OP / PRE-PROCEDURE SCREENING ORDER (NO ISOLATION) - Swab,  Nasopharynx.  Procedure                               Abnormality         Status                     ---------                               -----------         ------                     COVID-19, ABBOTT IN-HOUS...[[id number]]  Abnormal            Final result                 Please view results for these tests on the individual orders.    COVID-19, ABBOTT IN-HOUSE,NP Swab (NO TRANSPORT MEDIA) 2 HR TAT - Swab, Nasopharynx [[id number]]  (Abnormal) Collected: 12/02/20 1728    Lab Status: Final result Specimen: Swab from Nasopharynx Updated: 12/02/20 1825     COVID19 Detected    Narrative:      Fact sheet for providers: https://www.fda.gov/media/158782/download     Fact sheet for patients: https://www.fda.gov/media/368987/download                Radiology:   Imaging Results (Last 72 Hours)     Procedure Component Value Units Date/Time    XR Chest 1 View [[id number]] Collected: 12/07/20 2043     Updated: 12/07/20 2046    Narrative:      EXAMINATION:  XR CHEST 1 VW-  12/7/2020 8:28 PM CST     HISTORY: decreased O2 saturation; SOA; COVID +; U07.1-COVID-19     COMPARISON: 12/2/2020.     FINDINGS:  Bilateral infiltrates are not significantly changed. There is  continued cardiomegaly. No significant pleural effusion is seen.       Impression:      1. No significant change in bilateral infiltrates.  2. Cardiomegaly.        This report was finalized on 12/07/2020 20:43 by Dr. Isiah Benitez MD.          Assessment/Plan     Active Hospital Problems    Diagnosis   • **Pneumonia due to COVID-19 virus   • RITA (acute kidney injury) (CMS/HCC)   • COVID-19   • Acute respiratory failure with hypoxia (CMS/HCC)   • Benign essential hypertension   • Common variable immunodeficiency (CMS/HCC)       IMPRESSION:  COVID-19 infection with pneumonia and acute respiratory failure-status post convalescent plasma.  On remdesivir.  Acute kidney injury-resolved  Elevated CRP  Elevated ferritin  Elevated D-dimer   elevated LDH  Pyuria with few  squamous epithelial cells-urine culture in progress.  History of common variable immunodeficiency but no treatment in the last 20 years  MRSA nasal screen positive      RECOMMENDATION:   ·  Continue ceftriaxone for urinary tract infection pending urine culture results  · Continue dexamethasone  ·  continue remdesivir  · Continue anticoagulation per hospitalist  · Continue adjuvant therapy per hospitalist  · Up to chair when able  · Continue recommending proning  · If any change in condition, fever, concern for bacterial process, add vancomycin given MRSA nasal screen positive        Tressa Trevino MD  12/08/20  09:32 CST        Electronically signed by Tressa Trevino MD at 12/08/20 0947     Johnnie Zacarias MD at 12/08/20 0831              [facility] Medicine Services  INPATIENT PROGRESS NOTE    Patient Name: Radha Smiley  Date of Admission: 12/2/2020  Today's Date: 12/08/20  Length of Stay: 6  Primary Care Physician: Adrien De Leon MD    Subjective   Chief Complaint: Follow-up  HPI   She is a 55-year-old woman who I first saw on her fifth day of hospital stay.  She presented with history of cough and upper respiratory symptoms of 10 days duration was diagnosed with COVID-19.  From what I understand an admitting H&P it was prior to Thanksgiving.  She was treated in outpatient with supportive care.     When I saw her yesterday she is on 100% Vapotherm at 40 L/min as well as nonrebreather.  Taking her off the nonrebreather drops her oxygen in the mid 80s.  Follow-up chest x-ray showed bilateral infiltrates not significantly different from prior chest x-ray  When she is placed on BiPAP, she readily desaturates as well per conversation with nurse.  I gave her a dose of Lasix yesterday.  Electrolytes remain stable (potassium normal at 4, creatinine at 0.5).  She had negative fluid balance of 730  Her proBNP on December 3 was 688.  Noted as well that on  admit her creatinine was 1.5.  She had VQ scan due to elevated D-dimer.  This was read as low probability for pulmonary embolus.    She states she feels pretty good compared yesterday.  Still on BiPAP at 90% FiO2 with O2 saturation 89 to 93%    She claims to have no known medical problems besides history of smoking which she quit 13 years ago.  Review of Systems     All pertinent negatives and positives are as above. All other systems have been reviewed and are negative unless otherwise stated.     Objective    Temp:  [96.6 °F (35.9 °C)-98.3 °F (36.8 °C)] 96.6 °F (35.9 °C)  Heart Rate:  [] 107  Resp:  [22-30] 22  BP: (109-162)/() 141/88  Physical Exam  Resting comfortably on BiPAP with FiO2 of at 90% IPAP of 18 and EPAP of 6 with SPO2 of 89 to 93%  GEN: Resting at the moment, NAD, appears nontoxic  HEENT: Anicteric, trachea midline  Lungs: Equal chest rise bilaterally, increased respiratory rate but no overt accessory muscle use few fine crackles bases particularly on the right  heart: Tachycardic, regular rhythm  Abdomen: obese, soft  Extremities: No cyanosis or edema  Neuro: Moving all extremities with no obvious focal deficits         Results Review:  I have reviewed the labs, radiology results, and diagnostic studies.    Laboratory Data:   Results from last 7 days   Lab Units 12/07/20  0312 12/04/20  0553 12/03/20  0448   WBC 10*3/mm3 10.38 7.29 3.48   HEMOGLOBIN g/dL 12.1 13.1 11.7*   HEMATOCRIT % 37.8 40.9 37.1   PLATELETS 10*3/mm3 212 306 258        Results from last 7 days   Lab Units 12/08/20  0620 12/07/20  0312 12/06/20  0321   SODIUM mmol/L 139 142 142   POTASSIUM mmol/L 4.0 4.2 3.8   CHLORIDE mmol/L 103 107 105   CO2 mmol/L 25.0 26.0 27.0   BUN mg/dL 31* 27* 34*   CREATININE mg/dL 0.50* 0.52* 0.57   CALCIUM mg/dL 9.3 9.2 9.1   BILIRUBIN mg/dL 0.5 0.5 0.6   ALK PHOS U/L 112 109 113   ALT (SGPT) U/L 12 14 14   AST (SGOT) U/L 21 24 35*   GLUCOSE mg/dL 116* 108* 118*       Culture Data:   Blood  Culture   Date Value Ref Range Status   12/02/2020 No growth at 5 days  Final   12/02/2020 No growth at 5 days  Final     Urine Culture   Date Value Ref Range Status   12/03/2020 (A)  Final    >100,000 CFU/mL Klebsiella pneumoniae ssp pneumoniae       Radiology Data:   Imaging Results (Last 24 Hours)     Procedure Component Value Units Date/Time    XR Chest 1 View [[id number]] Collected: 12/07/20 2043     Updated: 12/07/20 2046    Narrative:      EXAMINATION:  XR CHEST 1 VW-  12/7/2020 8:28 PM CST     HISTORY: decreased O2 saturation; SOA; COVID +; U07.1-COVID-19     COMPARISON: 12/2/2020.     FINDINGS:  Bilateral infiltrates are not significantly changed. There is  continued cardiomegaly. No significant pleural effusion is seen.       Impression:      1. No significant change in bilateral infiltrates.  2. Cardiomegaly.        This report was finalized on 12/07/2020 20:43 by Dr. Isiah Benitez MD.          I have reviewed the patient's current medications.     Assessment/Plan     Active Hospital Problems    Diagnosis   • **Pneumonia due to COVID-19 virus   • RITA (acute kidney injury) (CMS/HCC)   • COVID-19   • Acute respiratory failure with hypoxia (CMS/HCC)   • Benign essential hypertension   • Common variable immunodeficiency (CMS/HCC)           Chest x-ray reviewed  Patient is on ceftriaxone (day 5) for Klebsiella pneumonia urinary tract infection  Continue supplemental oxygen to maintain O2 saturation greater than 90%  Continue supportive care: Zinc, statin, famotidine  Day 7 of dexamethasone; given increased requirement of oxygen, I will increase the dose to 10 mg;  DVT prophylaxis  Noted positive MRSA screen.  Monitor for any concern for secondary bacterial infection and will consider recommendation by ID on vancomycin  Echocardiogram for further evaluation of hypoxia; rule out component of pulmonary edema, continue on daily Lasix and monitor closely for any electrolyte abnormalities and renal  dysfunction  Patient received 5 doses of remdesivir.  Completed on December 7.  Discussed with nurse    I encouraged the patient to sleep on the chair most of the time if tolerated unless going to sleep.  If she is on bed I encouraged her to prone herself during sleep.  atorvastatin, 10 mg, Oral, Nightly  cefTRIAXone, 1 g, Intravenous, Q24H  cholecalciferol, 1,000 Units, Oral, Daily  cloNIDine, 0.1 mg, Oral, BID  dexamethasone, 10 mg, Intravenous, Daily  enoxaparin, 40 mg, Subcutaneous, Q24H  famotidine, 20 mg, Oral, BID  furosemide, 20 mg, Intravenous, Daily  lisinopril, 20 mg, Oral, Q12H  melatonin, 6 mg, Oral, Nightly  metoprolol tartrate, 25 mg, Oral, Q12H  sodium chloride, 10 mL, Intravenous, Q12H  technetium sestamibi, 1 dose, Intravenous, Once in imaging  vitamin C, 500 mg, Oral, Daily  zinc sulfate, 220 mg, Oral, Daily        Discharge Planning: ?  Electronically signed by Johnnie Zacarias MD, 12/08/20, 08:31 CST.      Electronically signed by Johnnie Zacarias MD at 12/08/20 1010     Johnnie Zacarias MD at 12/07/20 1942              [facility] Medicine Services  INPATIENT PROGRESS NOTE    Patient Name: Radha Smiley  Date of Admission: 12/2/2020  Today's Date: 12/07/20  Length of Stay: 5  Primary Care Physician: Adrien De Leon MD    Subjective   Chief Complaint: Follow-up  HPI   Discussed with nurse.  This is the first time that she is ever since set on the recliner.  She is on Vapotherm 100% as well as nonrebreather.  Noted O2 desaturation in the mid 80s off the nonrebreather  I reviewed with her her chest x-ray.  She feels a little better.  No reported cough  No nausea or vomiting  Afebrile        Review of Systems     All pertinent negatives and positives are as above. All other systems have been reviewed and are negative unless otherwise stated.     Objective    Temp:  [96.6 °F (35.9 °C)-98.3 °F (36.8 °C)] 97.9 °F (36.6 °C)  Heart  Rate:  [] 99  Resp:  [16-30] 25  BP: (105-171)/() 144/91  Physical Exam  TACHYCARDI  Sinus rhythm by telemetry  Desaturates on 100% fio and 40 lpm of oxygen via vapotherm but was 95% with addition of NRBMM  GEN: Resting at the moment, NAD, appears nontoxic  HEENT: Anicteric, trachea midline  Lungs: Equal chest rise bilaterally, increased respiratory rate but no overt accessory muscle use   heart: Tachycardic, regular rhythm  Abdomen: obese, soft  Extremities: No cyanosis or edema  Neuro: Moving all extremities with no obvious focal deficits              Results Review:  I have reviewed the labs, radiology results, and diagnostic studies.    Laboratory Data:   Results from last 7 days   Lab Units 12/07/20  0312 12/04/20  0553 12/03/20  0448   WBC 10*3/mm3 10.38 7.29 3.48   HEMOGLOBIN g/dL 12.1 13.1 11.7*   HEMATOCRIT % 37.8 40.9 37.1   PLATELETS 10*3/mm3 212 306 258        Results from last 7 days   Lab Units 12/07/20  0312 12/06/20  0321 12/05/20  0223   SODIUM mmol/L 142 142 142   POTASSIUM mmol/L 4.2 3.8 3.9   CHLORIDE mmol/L 107 105 104   CO2 mmol/L 26.0 27.0 28.0   BUN mg/dL 27* 34* 42*   CREATININE mg/dL 0.52* 0.57 0.58   CALCIUM mg/dL 9.2 9.1 9.4   BILIRUBIN mg/dL 0.5 0.6 0.4   ALK PHOS U/L 109 113 94   ALT (SGPT) U/L 14 14 12   AST (SGOT) U/L 24 35* 22   GLUCOSE mg/dL 108* 118* 121*       Culture Data:   Blood Culture   Date Value Ref Range Status   12/02/2020 No growth at 5 days  Final   12/02/2020 No growth at 5 days  Final     Urine Culture   Date Value Ref Range Status   12/03/2020 (A)  Final    >100,000 CFU/mL Klebsiella pneumoniae ssp pneumoniae       Radiology Data:   Imaging Results (Last 24 Hours)     ** No results found for the last 24 hours. **          I have reviewed the patient's current medications.     Assessment/Plan     Active Hospital Problems    Diagnosis   • **Pneumonia due to COVID-19 virus   • RITA (acute kidney injury) (CMS/HCC)   • COVID-19   • Acute respiratory  failure with hypoxia (CMS/HCC)   • Benign essential hypertension   • Common variable immunodeficiency (CMS/HCC)       With vapotherm and NRB her spo2 is 95%  Check bnp  Cont abx  ? Lasix    atorvastatin, 10 mg, Oral, Nightly  cefTRIAXone, 1 g, Intravenous, Q24H  cholecalciferol, 1,000 Units, Oral, Daily  cloNIDine, 0.1 mg, Oral, BID  dexamethasone, 6 mg, Oral, Daily With Breakfast    Or  dexamethasone, 6 mg, Intravenous, Daily With Breakfast  enoxaparin, 40 mg, Subcutaneous, Q24H  famotidine, 20 mg, Oral, BID  lisinopril, 20 mg, Oral, Q12H  melatonin, 6 mg, Oral, Nightly  metoprolol tartrate, 25 mg, Oral, Q12H  sodium chloride, 10 mL, Intravenous, Q12H  technetium sestamibi, 1 dose, Intravenous, Once in imaging  vitamin C, 500 mg, Oral, Daily  zinc sulfate, 220 mg, Oral, Daily      reviewed cxr and compared with prior cxr      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 Used to smoke 2 packs per day  No reported lung problem before    ABG reviewed    Will repeat cxr  ? I & O acurate report  Critical care time greater than 30 minutes    Discharge Planning:?  Electronically signed by Johnnie Zacarias MD, 12/07/20, 19:42 CST.      Electronically signed by Johnnie Zacarias MD at 12/07/20 2029     Tressa Trevino MD at 12/07/20 1221          INFECTIOUS DISEASES PROGRESS NOTE    Patient:  Radha Smiley  YOB: 1965  MRN: [id number]   Admit date: 12/2/2020   Admitting Physician: Johnnie Zacarias*  Primary Care Physician: Adrien De Leon MD    Chief Complaint:  SOA      Interval History: Patient tolerated BiPAP better through the night.  She told me her sats were 98% and she was very happy about that.  She had some trouble going to Vapotherm with the nonrebreather earlier this morning, she tried again around 9 AM and tolerated that.  Respiratory therapy is at bedside and they are trying to give  her a break from BiPAP again.        Allergies:   Allergies   Allergen Reactions   • Demerol [Meperidine] Rash       Current Meds:     Current Facility-Administered Medications:   •  acetaminophen (TYLENOL) tablet 650 mg, 650 mg, Oral, Q4H PRN, 650 mg at 12/05/20 1320 **OR** acetaminophen (TYLENOL) 160 MG/5ML solution 650 mg, 650 mg, Oral, Q4H PRN **OR** acetaminophen (TYLENOL) suppository 650 mg, 650 mg, Rectal, Q4H PRN, Gene Lui MD  •  atorvastatin (LIPITOR) tablet 10 mg, 10 mg, Oral, Nightly, Gene Lui MD, 10 mg at 12/06/20 1947  •  benzonatate (TESSALON) capsule 200 mg, 200 mg, Oral, Q4H PRN, Gene Lui MD, 200 mg at 12/05/20 2016  •  cefTRIAXone (ROCEPHIN) 1 g/100 mL 0.9% NS (MBP), 1 g, Intravenous, Q24H, Tressa Trevino MD, 1 g at 12/06/20 1818  •  cholecalciferol (VITAMIN D3) tablet 1,000 Units, 1,000 Units, Oral, Daily, Gene Lui MD, 1,000 Units at 12/07/20 0919  •  cloNIDine (CATAPRES) tablet 0.1 mg, 0.1 mg, Oral, BID, Leidy Joe DO, 0.1 mg at 12/07/20 0919  •  dexamethasone (DECADRON) tablet 6 mg, 6 mg, Oral, Daily With Breakfast, 6 mg at 12/07/20 0919 **OR** dexamethasone (DECADRON) injection 6 mg, 6 mg, Intravenous, Daily With Breakfast, Gene Lui MD  •  dextromethorphan polistirex ER (DELSYM) 30 MG/5ML oral suspension 60 mg, 10 mL, Oral, Q12H PRN, Gene Lui MD  •  enoxaparin (LOVENOX) syringe 40 mg, 40 mg, Subcutaneous, Q24H, Dav Fowler DO, 40 mg at 12/07/20 0919  •  famotidine (PEPCID) tablet 20 mg, 20 mg, Oral, BID, Gene Lui MD, 20 mg at 12/07/20 0919  •  ketorolac (TORADOL) injection 15 mg, 15 mg, Intravenous, Q6H PRN, Dav Fowler DO, 15 mg at 12/07/20 0606  •  labetalol (NORMODYNE,TRANDATE) injection 10 mg, 10 mg, Intravenous, Q6H PRN, Dav Fowler DO, 10 mg at 12/07/20 0611  •  lisinopril (PRINIVIL,ZESTRIL) tablet 20 mg, 20 mg, Oral, Q12H, Dav Fowler DO, 20 mg at 12/07/20 0919  •  melatonin  "tablet 6 mg, 6 mg, Oral, Nightly, Dav Fowler, , 6 mg at 20 194  •  metoprolol tartrate (LOPRESSOR) tablet 25 mg, 25 mg, Oral, Q12H, Dav Fowler DO, 25 mg at 20  •  ondansetron (ZOFRAN) tablet 4 mg, 4 mg, Oral, Q6H PRN **OR** ondansetron (ZOFRAN) injection 4 mg, 4 mg, Intravenous, Q6H PRN, Gene Lui MD  •  [COMPLETED] Insert peripheral IV, , , Once **AND** sodium chloride 0.9 % flush 10 mL, 10 mL, Intravenous, PRN, Gene Lui MD  •  sodium chloride 0.9 % flush 10 mL, 10 mL, Intravenous, Q12H, Gene Lui MD, 10 mL at 20  •  sodium chloride 0.9 % flush 10 mL, 10 mL, Intravenous, PRN, Gene Lui MD  •  technetium sestamibi (CARDIOLITE) injection 1 dose, 1 dose, Intravenous, Once in imaging, Dav Fowler DO  •  vitamin C (ASCORBIC ACID) tablet 500 mg, 500 mg, Oral, Daily, Gene Lui MD, 500 mg at 20  •  zinc sulfate (ZINCATE) capsule 220 mg, 220 mg, Oral, Daily, Gene Lui MD, 220 mg at 20      Review of Systems   Constitutional: Negative for fever.   Respiratory: Positive for shortness of breath.        Objective     Vital Signs:  Temp (24hrs), Av °F (36.1 °C), Min:96.6 °F (35.9 °C), Max:97.9 °F (36.6 °C)      /74   Pulse 96   Temp 97.9 °F (36.6 °C) (Axillary)   Resp 24   Ht 170.2 cm (67\")   Wt 109 kg (240 lb 14.4 oz)   SpO2 99%   BMI 37.73 kg/m²         Physical Exam     General: The patient is lying in bed appearing comfortable.  Respiratory therapy is bedside.  She is just been placed on Vapotherm and facemask.  HEENT: Vapotherm in place.  Nonrebreather facemask in place  Respiratory: Effort appeared even and unlabored however she was not very conversive.  Neuro: Patient alert, oriented, speech clear.  She let me know that her saturations were 98% earlier and held up her fingers.  Psych: Pleasant and cooperative     Results Review:    I reviewed the patient's new clinical " results.    Lab Results:  CBC:   Lab Results   Lab 12/02/20  1615 12/03/20  0448 12/04/20  0553 12/07/20  0312   WBC 6.36 3.48 7.29 10.38   HEMOGLOBIN 13.5 11.7* 13.1 12.1   HEMATOCRIT 41.4 37.1 40.9 37.8   PLATELETS 294 258 306 212         CMP:   Lab Results   Lab 12/05/20  0223 12/06/20  0321 12/07/20 0312   SODIUM 142 142 142   POTASSIUM 3.9 3.8 4.2   CHLORIDE 104 105 107   CO2 28.0 27.0 26.0   BUN 42* 34* 27*   CREATININE 0.58 0.57 0.52*   CALCIUM 9.4 9.1 9.2   BILIRUBIN 0.4 0.6 0.5   ALK PHOS 94 113 109   ALT (SGPT) 12 14 14   AST (SGOT) 22 35* 24   GLUCOSE 121* 118* 108*       Range & Units 10:35 04:35 3d ago   Site  Right Brachial  Left Radial  Right Radial    Jairo's Test  N/A  Positive  Positive    pH, Arterial   7.350 - 7.450 pH units 7.452High   7.457High  CM  7.477High  CM    Comment: 83 Value above reference range   pCO2, Arterial   35.0 - 45.0 mm Hg 38.7  38.5  40.9    pO2, Arterial   83.0 - 108.0 mm Hg 52.4Low Critical   60.3Low  CM  44.0Low Critical  CM    Comment: 85 Value below critical limit   HCO3, Arterial   20.0 - 26.0 mmol/L 27.0High   27.2High  CM  30.2High  CM    Comment: 83 Value above reference range   Base Excess, Arterial   0.0 - 2.0 mmol/L 2.9High   3.2High  CM  6.0High  CM    Comment: 83 Value above reference range   O2 Saturation, Arterial   94.0 - 99.0 % 89.8Low   92.9Low  CM  82.7Low  CM    Comment: 84 Value below reference range        >>>>>> was on  vapotherm alone with ABG      Culture Results:        Microbiology Results Abnormal     Procedure Component Value - Date/Time    MRSA Screen, PCR (Inpatient) - Swab, Nares [[id number]]  (Abnormal) Collected: 12/06/20 2158    Lab Status: Final result Specimen: Swab from Nares Updated: 12/06/20 2335     MRSA PCR MRSA Detected    Blood Culture - Blood, Arm, Left [[id number]] Collected: 12/02/20 1615    Lab Status: Preliminary result Specimen: Blood from Arm, Left Updated: 12/06/20 1700     Blood Culture No growth at 4 days    Blood Culture  - Blood, Arm, Left [[id number]] Collected: 12/02/20 1618    Lab Status: Preliminary result Specimen: Blood from Arm, Left Updated: 12/06/20 1700     Blood Culture No growth at 4 days    Urine Culture - Urine, Urine, Clean Catch [[id number]]  (Abnormal)  (Susceptibility) Collected: 12/03/20 1447    Lab Status: Final result Specimen: Urine, Clean Catch Updated: 12/06/20 0934     Urine Culture >100,000 CFU/mL Klebsiella pneumoniae ssp pneumoniae    Susceptibility      Klebsiella pneumoniae ssp pneumoniae     ARACELI     Ampicillin Resistant     Ampicillin + Sulbactam Susceptible     Cefazolin Susceptible     Cefepime Susceptible     Ceftazidime Susceptible     Ceftriaxone Susceptible     Gentamicin Susceptible     Levofloxacin Susceptible     Nitrofurantoin Intermediate     Piperacillin + Tazobactam Susceptible     Tetracycline Susceptible     Trimethoprim + Sulfamethoxazole Susceptible                    Legionella Antigen, Urine - Urine, Urine, Clean Catch [[id number]]  (Normal) Collected: 12/03/20 1447    Lab Status: Final result Specimen: Urine, Clean Catch Updated: 12/03/20 1535     LEGIONELLA ANTIGEN, URINE Negative    S. Pneumo Ag Urine or CSF - Urine, Urine, Clean Catch [[id number]]  (Normal) Collected: 12/03/20 1447    Lab Status: Final result Specimen: Urine, Clean Catch Updated: 12/03/20 1534     Strep Pneumo Ag Negative    Respiratory Panel, PCR (WITHOUT COVID) - Swab, Nasopharynx [[id number]]  (Normal) Collected: 12/03/20 1234    Lab Status: Final result Specimen: Swab from Nasopharynx Updated: 12/03/20 1351     ADENOVIRUS, PCR Not Detected     Coronavirus 229E Not Detected     Coronavirus HKU1 Not Detected     Coronavirus NL63 Not Detected     Coronavirus OC43 Not Detected     Human Metapneumovirus Not Detected     Human Rhinovirus/Enterovirus Not Detected     Influenza B PCR Not Detected     Parainfluenza Virus 1 Not Detected     Parainfluenza Virus 2 Not Detected     Parainfluenza Virus 3 Not Detected      Parainfluenza Virus 4 Not Detected     Bordetella pertussis pcr Not Detected     Influenza A H1 2009 PCR Not Detected     Chlamydophila pneumoniae PCR Not Detected     Mycoplasma pneumo by PCR Not Detected     Influenza A PCR Not Detected     Influenza A H3 Not Detected     Influenza A H1 Not Detected     RSV, PCR Not Detected     Bordetella parapertussis PCR Not Detected    Narrative:      The coronavirus on the RVP is NOT COVID-19 and is NOT indicative of infection with COVID-19.     COVID PRE-OP / PRE-PROCEDURE SCREENING ORDER (NO ISOLATION) - Swab, Nasopharynx [[id number]]  (Abnormal) Collected: 12/02/20 1728    Lab Status: Final result Specimen: Swab from Nasopharynx Updated: 12/02/20 1825    Narrative:      The following orders were created for panel order COVID PRE-OP / PRE-PROCEDURE SCREENING ORDER (NO ISOLATION) - Swab, Nasopharynx.  Procedure                               Abnormality         Status                     ---------                               -----------         ------                     COVID-19, ABBOTT IN-HOUS...[[id number]]  Abnormal            Final result                 Please view results for these tests on the individual orders.    COVID-19, ABBOTT IN-HOUSE,NP Swab (NO TRANSPORT MEDIA) 2 HR TAT - Swab, Nasopharynx [[id number]]  (Abnormal) Collected: 12/02/20 1728    Lab Status: Final result Specimen: Swab from Nasopharynx Updated: 12/02/20 1825     COVID19 Detected    Narrative:      Fact sheet for providers: https://www.fda.gov/media/346501/download     Fact sheet for patients: https://www.fda.gov/media/863100/download                Radiology:   Imaging Results (Last 72 Hours)     ** No results found for the last 72 hours. **          Assessment/Plan     Active Hospital Problems    Diagnosis   • **Pneumonia due to COVID-19 virus   • RITA (acute kidney injury) (CMS/HCC)   • COVID-19   • Acute respiratory failure with hypoxia (CMS/HCC)   • Benign essential hypertension   • Common  variable immunodeficiency (CMS/[facility])       IMPRESSION:  COVID-19 infection with pneumonia and acute respiratory failure-status post convalescent plasma/ remdesivir.  Acute kidney injury-resolved  Elevated CRP  Elevated ferritin  Elevated D-dimer elevated LDH  Pyuria with few squamous epithelial cells Klebsiella called..  History of common variable immunodeficiency but no treatment in the last 20 years  MRSA nasal screen positive    RECOMMENDATION:   · Continue ceftriaxone  ·  Continue ceftriaxone for urinary tract infection   · Continue dexamethasone  ·  Continue anticoagulation per hospitalist  · Continue adjuvant therapy per hospitalist  · ContinueMaximal oxygen support.  · Reviewed proning again with patient especially as a means to hopefully stave off intubation  · If any change in condition, fever, concern for bacterial process, add vancomycin given MRSA nasal screen positive.    Discussed with JEAN Fischer  Discussed with respiratory therapy  Tressa Trevino MD  12/07/20  12:21 CST        Electronically signed by Tressa Trevino MD at 12/08/20 1924

## 2020-12-08 NOTE — PROGRESS NOTES
[facility] Medicine Services  INPATIENT PROGRESS NOTE    Patient Name: Radha Smiley  Date of Admission: 12/2/2020  Today's Date: 12/07/20  Length of Stay: 5  Primary Care Physician: Adrien De Leon MD    Subjective   Chief Complaint: Follow-up  HPI   Discussed with nurse.  This is the first time that she is ever since set on the recliner.  She is on Vapotherm 100% as well as nonrebreather.  Noted O2 desaturation in the mid 80s off the nonrebreather  I reviewed with her her chest x-ray.  She feels a little better.  No reported cough  No nausea or vomiting  Afebrile        Review of Systems     All pertinent negatives and positives are as above. All other systems have been reviewed and are negative unless otherwise stated.     Objective    Temp:  [96.6 °F (35.9 °C)-98.3 °F (36.8 °C)] 97.9 °F (36.6 °C)  Heart Rate:  [] 99  Resp:  [16-30] 25  BP: (105-171)/() 144/91  Physical Exam  TACHYCARDI  Sinus rhythm by telemetry  Desaturates on 100% fio and 40 lpm of oxygen via vapotherm but was 95% with addition of NRBMM  GEN: Resting at the moment, NAD, appears nontoxic  HEENT: Anicteric, trachea midline  Lungs: Equal chest rise bilaterally, increased respiratory rate but no overt accessory muscle use   heart: Tachycardic, regular rhythm  Abdomen: obese, soft  Extremities: No cyanosis or edema  Neuro: Moving all extremities with no obvious focal deficits              Results Review:  I have reviewed the labs, radiology results, and diagnostic studies.    Laboratory Data:   Results from last 7 days   Lab Units 12/07/20  0312 12/04/20  0553 12/03/20  0448   WBC 10*3/mm3 10.38 7.29 3.48   HEMOGLOBIN g/dL 12.1 13.1 11.7*   HEMATOCRIT % 37.8 40.9 37.1   PLATELETS 10*3/mm3 212 306 258        Results from last 7 days   Lab Units 12/07/20  0312 12/06/20  0321 12/05/20  0223   SODIUM mmol/L 142 142 142   POTASSIUM mmol/L 4.2 3.8 3.9   CHLORIDE mmol/L 107 105 104   CO2  mmol/L 26.0 27.0 28.0   BUN mg/dL 27* 34* 42*   CREATININE mg/dL 0.52* 0.57 0.58   CALCIUM mg/dL 9.2 9.1 9.4   BILIRUBIN mg/dL 0.5 0.6 0.4   ALK PHOS U/L 109 113 94   ALT (SGPT) U/L 14 14 12   AST (SGOT) U/L 24 35* 22   GLUCOSE mg/dL 108* 118* 121*       Culture Data:   Blood Culture   Date Value Ref Range Status   12/02/2020 No growth at 5 days  Final   12/02/2020 No growth at 5 days  Final     Urine Culture   Date Value Ref Range Status   12/03/2020 (A)  Final    >100,000 CFU/mL Klebsiella pneumoniae ssp pneumoniae       Radiology Data:   Imaging Results (Last 24 Hours)     ** No results found for the last 24 hours. **          I have reviewed the patient's current medications.     Assessment/Plan     Active Hospital Problems    Diagnosis   • **Pneumonia due to COVID-19 virus   • RITA (acute kidney injury) (CMS/HCC)   • COVID-19   • Acute respiratory failure with hypoxia (CMS/HCC)   • Benign essential hypertension   • Common variable immunodeficiency (CMS/HCC)       With vapotherm and NRB her spo2 is 95%  Check bnp  Cont abx  ? Lasix    atorvastatin, 10 mg, Oral, Nightly  cefTRIAXone, 1 g, Intravenous, Q24H  cholecalciferol, 1,000 Units, Oral, Daily  cloNIDine, 0.1 mg, Oral, BID  dexamethasone, 6 mg, Oral, Daily With Breakfast    Or  dexamethasone, 6 mg, Intravenous, Daily With Breakfast  enoxaparin, 40 mg, Subcutaneous, Q24H  famotidine, 20 mg, Oral, BID  lisinopril, 20 mg, Oral, Q12H  melatonin, 6 mg, Oral, Nightly  metoprolol tartrate, 25 mg, Oral, Q12H  sodium chloride, 10 mL, Intravenous, Q12H  technetium sestamibi, 1 dose, Intravenous, Once in imaging  vitamin C, 500 mg, Oral, Daily  zinc sulfate, 220 mg, Oral, Daily      reviewed cxr and compared with prior cxr      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 Used to smoke 2 packs per day  No reported lung problem before    ABG reviewed    Will repeat cxr  ? I & O acurate  report  Critical care time greater than 30 minutes    Discharge Planning:?  Electronically signed by Johnnie Zacarias MD, 12/07/20, 19:42 CST.

## 2020-12-08 NOTE — PROGRESS NOTES
[facility] Medicine Services  INPATIENT PROGRESS NOTE    Patient Name: Radha Smiley  Date of Admission: 12/2/2020  Today's Date: 12/08/20  Length of Stay: 6  Primary Care Physician: Adrien De Leon MD    Subjective   Chief Complaint: Follow-up  HPI   She is a 55-year-old woman who I first saw on her fifth day of hospital stay.  She presented with history of cough and upper respiratory symptoms of 10 days duration was diagnosed with COVID-19.  From what I understand an admitting H&P it was prior to Thanksgiving.  She was treated in outpatient with supportive care.     When I saw her yesterday she is on 100% Vapotherm at 40 L/min as well as nonrebreather.  Taking her off the nonrebreather drops her oxygen in the mid 80s.  Follow-up chest x-ray showed bilateral infiltrates not significantly different from prior chest x-ray  When she is placed on BiPAP, she readily desaturates as well per conversation with nurse.  I gave her a dose of Lasix yesterday.  Electrolytes remain stable (potassium normal at 4, creatinine at 0.5).  She had negative fluid balance of 730  Her proBNP on December 3 was 688.  Noted as well that on admit her creatinine was 1.5.  She had VQ scan due to elevated D-dimer.  This was read as low probability for pulmonary embolus.    She states she feels pretty good compared yesterday.  Still on BiPAP at 90% FiO2 with O2 saturation 89 to 93%    She claims to have no known medical problems besides history of smoking which she quit 13 years ago.  Review of Systems     All pertinent negatives and positives are as above. All other systems have been reviewed and are negative unless otherwise stated.     Objective    Temp:  [96.6 °F (35.9 °C)-98.3 °F (36.8 °C)] 96.6 °F (35.9 °C)  Heart Rate:  [] 107  Resp:  [22-30] 22  BP: (109-162)/() 141/88  Physical Exam  Resting comfortably on BiPAP with FiO2 of at 90% IPAP of 18 and EPAP of 6 with SPO2 of 89 to  93%  GEN: Resting at the moment, NAD, appears nontoxic  HEENT: Anicteric, trachea midline  Lungs: Equal chest rise bilaterally, increased respiratory rate but no overt accessory muscle use few fine crackles bases particularly on the right  heart: Tachycardic, regular rhythm  Abdomen: obese, soft  Extremities: No cyanosis or edema  Neuro: Moving all extremities with no obvious focal deficits         Results Review:  I have reviewed the labs, radiology results, and diagnostic studies.    Laboratory Data:   Results from last 7 days   Lab Units 12/07/20  0312 12/04/20  0553 12/03/20  0448   WBC 10*3/mm3 10.38 7.29 3.48   HEMOGLOBIN g/dL 12.1 13.1 11.7*   HEMATOCRIT % 37.8 40.9 37.1   PLATELETS 10*3/mm3 212 306 258        Results from last 7 days   Lab Units 12/08/20  0620 12/07/20  0312 12/06/20  0321   SODIUM mmol/L 139 142 142   POTASSIUM mmol/L 4.0 4.2 3.8   CHLORIDE mmol/L 103 107 105   CO2 mmol/L 25.0 26.0 27.0   BUN mg/dL 31* 27* 34*   CREATININE mg/dL 0.50* 0.52* 0.57   CALCIUM mg/dL 9.3 9.2 9.1   BILIRUBIN mg/dL 0.5 0.5 0.6   ALK PHOS U/L 112 109 113   ALT (SGPT) U/L 12 14 14   AST (SGOT) U/L 21 24 35*   GLUCOSE mg/dL 116* 108* 118*       Culture Data:   Blood Culture   Date Value Ref Range Status   12/02/2020 No growth at 5 days  Final   12/02/2020 No growth at 5 days  Final     Urine Culture   Date Value Ref Range Status   12/03/2020 (A)  Final    >100,000 CFU/mL Klebsiella pneumoniae ssp pneumoniae       Radiology Data:   Imaging Results (Last 24 Hours)     Procedure Component Value Units Date/Time    XR Chest 1 View [[id number]] Collected: 12/07/20 2043     Updated: 12/07/20 2046    Narrative:      EXAMINATION:  XR CHEST 1 VW-  12/7/2020 8:28 PM CST     HISTORY: decreased O2 saturation; SOA; COVID +; U07.1-COVID-19     COMPARISON: 12/2/2020.     FINDINGS:  Bilateral infiltrates are not significantly changed. There is  continued cardiomegaly. No significant pleural effusion is seen.       Impression:      1.  No significant change in bilateral infiltrates.  2. Cardiomegaly.        This report was finalized on 12/07/2020 20:43 by Dr. Isiah Benitez MD.          I have reviewed the patient's current medications.     Assessment/Plan     Active Hospital Problems    Diagnosis   • **Pneumonia due to COVID-19 virus   • RITA (acute kidney injury) (CMS/[facility])   • COVID-19   • Acute respiratory failure with hypoxia (CMS/[facility])   • Benign essential hypertension   • Common variable immunodeficiency (CMS/[facility])           Chest x-ray reviewed  Patient is on ceftriaxone (day 5) for Klebsiella pneumonia urinary tract infection  Continue supplemental oxygen to maintain O2 saturation greater than 90%  Continue supportive care: Zinc, statin, famotidine  Day 7 of dexamethasone; given increased requirement of oxygen, I will increase the dose to 10 mg;  DVT prophylaxis  Noted positive MRSA screen.  Monitor for any concern for secondary bacterial infection and will consider recommendation by ID on vancomycin  Echocardiogram for further evaluation of hypoxia; rule out component of pulmonary edema, continue on daily Lasix and monitor closely for any electrolyte abnormalities and renal dysfunction  Patient received 5 doses of remdesivir.  Completed on December 7.  Discussed with nurse    I encouraged the patient to sleep on the chair most of the time if tolerated unless going to sleep.  If she is on bed I encouraged her to prone herself during sleep.  atorvastatin, 10 mg, Oral, Nightly  cefTRIAXone, 1 g, Intravenous, Q24H  cholecalciferol, 1,000 Units, Oral, Daily  cloNIDine, 0.1 mg, Oral, BID  dexamethasone, 10 mg, Intravenous, Daily  enoxaparin, 40 mg, Subcutaneous, Q24H  famotidine, 20 mg, Oral, BID  furosemide, 20 mg, Intravenous, Daily  lisinopril, 20 mg, Oral, Q12H  melatonin, 6 mg, Oral, Nightly  metoprolol tartrate, 25 mg, Oral, Q12H  sodium chloride, 10 mL, Intravenous, Q12H  technetium sestamibi, 1 dose, Intravenous, Once in imaging  vitamin  C, 500 mg, Oral, Daily  zinc sulfate, 220 mg, Oral, Daily        Discharge Planning: ?  Electronically signed by Johnnie Zacarias MD, 12/08/20, 08:31 CST.

## 2020-12-08 NOTE — PROGRESS NOTES
INFECTIOUS DISEASES PROGRESS NOTE    Patient:  Radha Smiley  YOB: 1965  MRN: [id number]   Admit date: 12/2/2020   Admitting Physician: Johnnie Zacarias*  Primary Care Physician: Adrien De Leon MD    Chief Complaint:  SOA      Interval History: Reviewed with Aaliyah RN as she had the patient yesterday.  She reports the patient did very well with Vapotherm when she sat up in a chair.    Patient is currently in bed on BiPAP but JEAN Rubin plans to get her up to the chair today.        Allergies:   Allergies   Allergen Reactions   • Demerol [Meperidine] Rash       Current Meds:     Current Facility-Administered Medications:   •  acetaminophen (TYLENOL) tablet 650 mg, 650 mg, Oral, Q4H PRN, 650 mg at 12/05/20 1320 **OR** acetaminophen (TYLENOL) 160 MG/5ML solution 650 mg, 650 mg, Oral, Q4H PRN **OR** acetaminophen (TYLENOL) suppository 650 mg, 650 mg, Rectal, Q4H PRN, Gene Lui MD  •  atorvastatin (LIPITOR) tablet 10 mg, 10 mg, Oral, Nightly, Gene Lui MD, 10 mg at 12/07/20 2018  •  benzonatate (TESSALON) capsule 200 mg, 200 mg, Oral, Q4H PRN, Gene Lui MD, 200 mg at 12/05/20 2016  •  cefTRIAXone (ROCEPHIN) 1 g/100 mL 0.9% NS (MBP), 1 g, Intravenous, Q24H, Tressa Trevino MD, 1 g at 12/07/20 1826  •  cholecalciferol (VITAMIN D3) tablet 1,000 Units, 1,000 Units, Oral, Daily, Gene Lui MD, 1,000 Units at 12/08/20 0751  •  cloNIDine (CATAPRES) tablet 0.1 mg, 0.1 mg, Oral, BID, Leidy Joe DO, 0.1 mg at 12/08/20 0753  •  dexamethasone (DECADRON) tablet 6 mg, 6 mg, Oral, Daily With Breakfast, 6 mg at 12/08/20 0750 **OR** dexamethasone (DECADRON) injection 6 mg, 6 mg, Intravenous, Daily With Breakfast, Gene Lui MD  •  dextromethorphan polistirex ER (DELSYM) 30 MG/5ML oral suspension 60 mg, 10 mL, Oral, Q12H PRN, Gene Lui MD  •  enoxaparin (LOVENOX) syringe 40 mg, 40 mg, Subcutaneous, Q24H, Dav Fowler DO, 40 mg at  "20 0754  •  famotidine (PEPCID) tablet 20 mg, 20 mg, Oral, BID, Gene Lui MD, 20 mg at 20 0753  •  ketorolac (TORADOL) injection 15 mg, 15 mg, Intravenous, Q6H PRN, Dav Fowler DO, 15 mg at 20 0652  •  labetalol (NORMODYNE,TRANDATE) injection 10 mg, 10 mg, Intravenous, Q6H PRN, Dav Fowler DO, 10 mg at 20 0611  •  lisinopril (PRINIVIL,ZESTRIL) tablet 20 mg, 20 mg, Oral, Q12H, Dav Fowler DO, 20 mg at 20 075  •  melatonin tablet 6 mg, 6 mg, Oral, Nightly, Dav Fowler DO, 6 mg at 20  •  metoprolol tartrate (LOPRESSOR) tablet 25 mg, 25 mg, Oral, Q12H, Dav Fowler DO, 25 mg at 20 075  •  ondansetron (ZOFRAN) tablet 4 mg, 4 mg, Oral, Q6H PRN **OR** ondansetron (ZOFRAN) injection 4 mg, 4 mg, Intravenous, Q6H PRN, Gene Lui MD  •  [COMPLETED] Insert peripheral IV, , , Once **AND** sodium chloride 0.9 % flush 10 mL, 10 mL, Intravenous, PRN, Gene Lui MD  •  sodium chloride 0.9 % flush 10 mL, 10 mL, Intravenous, Q12H, Gene Lui MD, 10 mL at 20 0755  •  sodium chloride 0.9 % flush 10 mL, 10 mL, Intravenous, PRN, Gene Lui MD  •  technetium sestamibi (CARDIOLITE) injection 1 dose, 1 dose, Intravenous, Once in imaging, Dav Fowler DO  •  vitamin C (ASCORBIC ACID) tablet 500 mg, 500 mg, Oral, Daily, Gene Lui MD, 500 mg at 20 0753  •  zinc sulfate (ZINCATE) capsule 220 mg, 220 mg, Oral, Daily, Gene uLi MD, 220 mg at 20 0751      Review of Systems   Unable to perform ROS: Acuity of condition       Objective     Vital Signs:  Temp (24hrs), Av.5 °F (36.4 °C), Min:96.6 °F (35.9 °C), Max:98.3 °F (36.8 °C)      /75   Pulse 90   Temp 98 °F (36.7 °C) (Axillary)   Resp 21   Ht 170.2 cm (67\")   Wt 109 kg (239 lb 4.8 oz)   SpO2 90%   BMI 37.48 kg/m²         Physical Exam     General: The patient is sitting up in bed lying on her back with BiPAP in place " appearing comfortable..  HEENT: BiPAP in place.    Respiratory: Effort appeared even and unlabored   Neuro: Reported to be alert and oriented  Psych: Reported to be cooperative with staff.  Results Review:    I reviewed the patient's new clinical results.    Lab Results:  CBC:   Lab Results   Lab 12/02/20  1615 12/03/20  0448 12/04/20  0553 12/07/20  0312   WBC 6.36 3.48 7.29 10.38   HEMOGLOBIN 13.5 11.7* 13.1 12.1   HEMATOCRIT 41.4 37.1 40.9 37.8   PLATELETS 294 258 306 212         CMP:   Lab Results   Lab 12/06/20  0321 12/07/20  0312 12/08/20  0620   SODIUM 142 142 139   POTASSIUM 3.8 4.2 4.0   CHLORIDE 105 107 103   CO2 27.0 26.0 25.0   BUN 34* 27* 31*   CREATININE 0.57 0.52* 0.50*   CALCIUM 9.1 9.2 9.3   BILIRUBIN 0.6 0.5 0.5   ALK PHOS 113 109 112   ALT (SGPT) 14 14 12   AST (SGOT) 35* 24 21   GLUCOSE 118* 108* 116*         Culture Results:        Microbiology Results Abnormal     Procedure Component Value - Date/Time    Blood Culture - Blood, Arm, Left [[id number]] Collected: 12/02/20 1615    Lab Status: Final result Specimen: Blood from Arm, Left Updated: 12/07/20 1700     Blood Culture No growth at 5 days    Blood Culture - Blood, Arm, Left [[id number]] Collected: 12/02/20 1618    Lab Status: Final result Specimen: Blood from Arm, Left Updated: 12/07/20 1700     Blood Culture No growth at 5 days    MRSA Screen, PCR (Inpatient) - Swab, Nares [[id number]]  (Abnormal) Collected: 12/06/20 2158    Lab Status: Final result Specimen: Swab from Nares Updated: 12/06/20 2335     MRSA PCR MRSA Detected    Urine Culture - Urine, Urine, Clean Catch [[id number]]  (Abnormal)  (Susceptibility) Collected: 12/03/20 1447    Lab Status: Final result Specimen: Urine, Clean Catch Updated: 12/06/20 0934     Urine Culture >100,000 CFU/mL Klebsiella pneumoniae ssp pneumoniae    Susceptibility      Klebsiella pneumoniae ssp pneumoniae     ARACELI     Ampicillin Resistant     Ampicillin + Sulbactam Susceptible     Cefazolin Susceptible      Cefepime Susceptible     Ceftazidime Susceptible     Ceftriaxone Susceptible     Gentamicin Susceptible     Levofloxacin Susceptible     Nitrofurantoin Intermediate     Piperacillin + Tazobactam Susceptible     Tetracycline Susceptible     Trimethoprim + Sulfamethoxazole Susceptible                    Legionella Antigen, Urine - Urine, Urine, Clean Catch [[id number]]  (Normal) Collected: 12/03/20 1447    Lab Status: Final result Specimen: Urine, Clean Catch Updated: 12/03/20 1535     LEGIONELLA ANTIGEN, URINE Negative    S. Pneumo Ag Urine or CSF - Urine, Urine, Clean Catch [[id number]]  (Normal) Collected: 12/03/20 1447    Lab Status: Final result Specimen: Urine, Clean Catch Updated: 12/03/20 1534     Strep Pneumo Ag Negative    Respiratory Panel, PCR (WITHOUT COVID) - Swab, Nasopharynx [[id number]]  (Normal) Collected: 12/03/20 1234    Lab Status: Final result Specimen: Swab from Nasopharynx Updated: 12/03/20 1351     ADENOVIRUS, PCR Not Detected     Coronavirus 229E Not Detected     Coronavirus HKU1 Not Detected     Coronavirus NL63 Not Detected     Coronavirus OC43 Not Detected     Human Metapneumovirus Not Detected     Human Rhinovirus/Enterovirus Not Detected     Influenza B PCR Not Detected     Parainfluenza Virus 1 Not Detected     Parainfluenza Virus 2 Not Detected     Parainfluenza Virus 3 Not Detected     Parainfluenza Virus 4 Not Detected     Bordetella pertussis pcr Not Detected     Influenza A H1 2009 PCR Not Detected     Chlamydophila pneumoniae PCR Not Detected     Mycoplasma pneumo by PCR Not Detected     Influenza A PCR Not Detected     Influenza A H3 Not Detected     Influenza A H1 Not Detected     RSV, PCR Not Detected     Bordetella parapertussis PCR Not Detected    Narrative:      The coronavirus on the RVP is NOT COVID-19 and is NOT indicative of infection with COVID-19.     COVID PRE-OP / PRE-PROCEDURE SCREENING ORDER (NO ISOLATION) - Swab, Nasopharynx [[id number]]  (Abnormal) Collected:  12/02/20 1728    Lab Status: Final result Specimen: Swab from Nasopharynx Updated: 12/02/20 1825    Narrative:      The following orders were created for panel order COVID PRE-OP / PRE-PROCEDURE SCREENING ORDER (NO ISOLATION) - Swab, Nasopharynx.  Procedure                               Abnormality         Status                     ---------                               -----------         ------                     COVID-19, [facility] IN-HOUS...[[id number]]  Abnormal            Final result                 Please view results for these tests on the individual orders.    COVID-19, [facility] IN-HOUSE,NP Swab (NO TRANSPORT MEDIA) 2 HR TAT - Swab, Nasopharynx [[id number]]  (Abnormal) Collected: 12/02/20 1728    Lab Status: Final result Specimen: Swab from Nasopharynx Updated: 12/02/20 1825     COVID19 Detected    Narrative:      Fact sheet for providers: https://www.fda.gov/media/675052/download     Fact sheet for patients: https://www.fda.gov/media/414212/download                Radiology:   Imaging Results (Last 72 Hours)     Procedure Component Value Units Date/Time    XR Chest 1 View [[id number]] Collected: 12/07/20 2043     Updated: 12/07/20 2046    Narrative:      EXAMINATION:  XR CHEST 1 VW-  12/7/2020 8:28 PM CST     HISTORY: decreased O2 saturation; SOA; COVID +; U07.1-COVID-19     COMPARISON: 12/2/2020.     FINDINGS:  Bilateral infiltrates are not significantly changed. There is  continued cardiomegaly. No significant pleural effusion is seen.       Impression:      1. No significant change in bilateral infiltrates.  2. Cardiomegaly.        This report was finalized on 12/07/2020 20:43 by Dr. Isiah Benitez MD.          Assessment/Plan     Active Hospital Problems    Diagnosis   • **Pneumonia due to COVID-19 virus   • RITA (acute kidney injury) (CMS/HCC)   • COVID-19   • Acute respiratory failure with hypoxia (CMS/HCC)   • Benign essential hypertension   • Common variable immunodeficiency (CMS/HCC)        IMPRESSION:  COVID-19 infection with pneumonia and acute respiratory failure-status post convalescent plasma.  On remdesivir.  Acute kidney injury-resolved  Elevated CRP  Elevated ferritin  Elevated D-dimer   elevated LDH  Pyuria with few squamous epithelial cells-urine culture in progress.  History of common variable immunodeficiency but no treatment in the last 20 years  MRSA nasal screen positive      RECOMMENDATION:   ·  Continue ceftriaxone for urinary tract infection pending urine culture results  · Continue dexamethasone  ·  continue remdesivir  · Continue anticoagulation per hospitalist  · Continue adjuvant therapy per hospitalist  · Up to chair when able  · Continue recommending proning  · If any change in condition, fever, concern for bacterial process, add vancomycin given MRSA nasal screen positive        Tressa Trevino MD  12/08/20  09:32 CST

## 2020-12-09 PROBLEM — U07.1 ACUTE RESPIRATORY DISTRESS SYNDROME (ARDS) DUE TO COVID-19 VIRUS (HCC): Status: ACTIVE | Noted: 2020-01-01

## 2020-12-09 PROBLEM — J80 ACUTE RESPIRATORY DISTRESS SYNDROME (ARDS) DUE TO COVID-19 VIRUS (HCC): Status: ACTIVE | Noted: 2020-01-01

## 2020-12-09 NOTE — PROGRESS NOTES
[facility] Medicine Services  INPATIENT PROGRESS NOTE    Patient Name: Radha Smiely  Date of Admission: 12/2/2020  Today's Date: 12/09/20  Length of Stay: 7  Primary Care Physician: Adrien De Leon MD    Subjective   Chief Complaint: f/u   HPI   Patient seen through glass door  Gives me a thumbs up and spoke through her bipap mask that she is doing ok.   No worse nor gross improvement in her status  Still requiring 100% fio2 on BIPAP         Review of Systems     All pertinent negatives and positives are as above. All other systems have been reviewed and are negative unless otherwise stated.     Objective    Temp:  [97.6 °F (36.4 °C)-98.8 °F (37.1 °C)] 97.6 °F (36.4 °C)  Heart Rate:  [] 112  Resp:  [23-28] 28  BP: ()/() 145/88  Physical Exam  Resting comfortably on BiPAP with FiO2 of at 100% I SPO2 of 91to 93%  GEN: a&Ox3; coherent, NAD, appears nontoxic  HEENT: Anicteric, trachea midline  Lungs: Equal chest rise bilaterally, increased respiratory rate but no overt accessory muscle use  heart: Tachycardic, regular rhythm  Abdomen: obese, soft  Extremities: No cyanosis or edema  Neuro: Moving all extremities with no obvious focal deficits     Results Review:  I have reviewed the labs, radiology results, and diagnostic studies.    Laboratory Data:   Results from last 7 days   Lab Units 12/07/20  0312 12/04/20  0553 12/03/20  0448   WBC 10*3/mm3 10.38 7.29 3.48   HEMOGLOBIN g/dL 12.1 13.1 11.7*   HEMATOCRIT % 37.8 40.9 37.1   PLATELETS 10*3/mm3 212 306 258        Results from last 7 days   Lab Units 12/09/20  0303 12/08/20  0620 12/07/20  0312   SODIUM mmol/L 139 139 142   POTASSIUM mmol/L 4.4 4.0 4.2   CHLORIDE mmol/L 101 103 107   CO2 mmol/L 27.0 25.0 26.0   BUN mg/dL 28* 31* 27*   CREATININE mg/dL 0.53* 0.50* 0.52*   CALCIUM mg/dL 9.1 9.3 9.2   BILIRUBIN mg/dL 0.6 0.5 0.5   ALK PHOS U/L 118* 112 109   ALT (SGPT) U/L 14 12 14   AST (SGOT) U/L 19 21 24     GLUCOSE mg/dL 104* 116* 108*       Culture Data:   Blood Culture   Date Value Ref Range Status   12/02/2020 No growth at 5 days  Final   12/02/2020 No growth at 5 days  Final     Urine Culture   Date Value Ref Range Status   12/03/2020 (A)  Final    >100,000 CFU/mL Klebsiella pneumoniae ssp pneumoniae       Radiology Data:   Imaging Results (Last 24 Hours)     ** No results found for the last 24 hours. **          I have reviewed the patient's current medications.     Assessment/Plan     Active Hospital Problems    Diagnosis   • **Pneumonia due to COVID-19 virus   • Acute respiratory distress syndrome (ARDS) due to COVID-19 virus (CMS/[facility])   • RITA (acute kidney injury) (CMS/[facility])   • COVID-19   • Acute respiratory failure with hypoxia (CMS/[facility])   • Benign essential hypertension   • Common variable immunodeficiency (CMS/[facility])       · Continue oxygen supplementation BIPAP or vapotherm to keep spo between 90-94%  · d8 decadron + decadron; I increased this yesterday as she remians to have high o2 requirement  · dvt proph; continue other supportive care  · She has been getting lasix 20 mg since the 7th of Dec. Creatinine and electrolytes are ok. She has good urine output.  Input has not been recorded though.  We will continue on Lasix  · remdesivir completed on 12/7  · Patient is on ceftriaxone (day 6) for Klebsiella pneumonia urinary tract infection  · Noted positive MRSA screen.  Monitor for any concern for secondary bacterial infection and will consider recommendation by ID on vancomycin. Per review of ID note, she had convalescent plasma  · Discussed with nurse: get her to chair as tolerated  · I encouraged the patient to sleep on the chair most of the time if tolerated unless going to sleep.  If she is on bed I encouraged her to prone herself during sleep. I reiterated this yesterday.  · Echo revewed:  Interpretation Summary    · Left ventricular ejection fraction appears to be 66 - 70%. Left ventricular systolic  function is normal.  · Left ventricular diastolic function was normal.  · No evidence of pulmonary hypertension is present.        CRP 18.6 (8.9)  Ferritin 689 (738.9)   (628)  Procalcitonin 0.09  D-dimer 19.3 (0.52) -she had a VQ scan on December 3 that was read as low probability for pulmonary embolism.  It appeared that she had acute kidney injury on admit where her creatinine was 1.52.  I am considering increasing DVT prophylaxis dose to twice daily.  Monitor for any active bleeding.  Imaging study of the chest does not appear that of PE.      atorvastatin, 10 mg, Oral, Nightly  cefTRIAXone, 1 g, Intravenous, Q24H  cholecalciferol, 1,000 Units, Oral, Daily  cloNIDine, 0.1 mg, Oral, BID  dexamethasone, 10 mg, Intravenous, Daily  enoxaparin, 40 mg, Subcutaneous, Q12H  famotidine, 20 mg, Oral, BID  furosemide, 20 mg, Intravenous, Daily  lisinopril, 20 mg, Oral, Q12H  melatonin, 6 mg, Oral, Nightly  metoprolol tartrate, 25 mg, Oral, Q12H  sodium chloride, 10 mL, Intravenous, Q12H  technetium sestamibi, 1 dose, Intravenous, Once in imaging  vitamin C, 500 mg, Oral, Daily  zinc sulfate, 220 mg, Oral, Daily      Critical condition  Prognosis guarded  Critical care time spent greater than 30 minutes      Discharge Planning: ?  Electronically signed by Johnnie Zacarias MD, 12/09/20, 09:41 CST.

## 2020-12-09 NOTE — PROGRESS NOTES
INFECTIOUS DISEASES PROGRESS NOTE    Patient:  Radha Smiley  YOB: 1965  MRN: [id number]   Admit date: 12/2/2020   Admitting Physician: Johnnie Zacarias*  Primary Care Physician: Adrien De Leon MD    Chief Complaint:  SOA      Interval History: Reviewed with JEAN Rubin.  Patient has been doing better up in the chair.  She is on Vapotherm but has been on BiPAP quite a bit    Sats were in the upper 80s initially when I was there but drifted down to the upper 70s briefly.    Patient reported that she felt stronger          Allergies:   Allergies   Allergen Reactions   • Demerol [Meperidine] Rash       Current Meds:     Current Facility-Administered Medications:   •  acetaminophen (TYLENOL) tablet 650 mg, 650 mg, Oral, Q4H PRN, 650 mg at 12/09/20 0829 **OR** acetaminophen (TYLENOL) 160 MG/5ML solution 650 mg, 650 mg, Oral, Q4H PRN **OR** acetaminophen (TYLENOL) suppository 650 mg, 650 mg, Rectal, Q4H PRN, Gene Lui MD  •  atorvastatin (LIPITOR) tablet 10 mg, 10 mg, Oral, Nightly, Gene Lui MD, 10 mg at 12/08/20 2027  •  benzonatate (TESSALON) capsule 200 mg, 200 mg, Oral, Q4H PRN, Gene Lui MD, 200 mg at 12/05/20 2016  •  cefTRIAXone (ROCEPHIN) 1 g/100 mL 0.9% NS (MBP), 1 g, Intravenous, Q24H, Tressa Trevino MD, 1 g at 12/08/20 1721  •  cholecalciferol (VITAMIN D3) tablet 1,000 Units, 1,000 Units, Oral, Daily, Gene Lui MD, 1,000 Units at 12/09/20 0819  •  cloNIDine (CATAPRES) tablet 0.1 mg, 0.1 mg, Oral, BID, Leidy Joe DO, 0.1 mg at 12/09/20 0819  •  dexamethasone sodium phosphate injection 10 mg, 10 mg, Intravenous, Daily, Johnnie Zacarias MD, 10 mg at 12/09/20 0819  •  dextromethorphan polistirex ER (DELSYM) 30 MG/5ML oral suspension 60 mg, 10 mL, Oral, Q12H PRN, Gene Lui MD  •  enoxaparin (LOVENOX) syringe 40 mg, 40 mg, Subcutaneous, Q12H, Johnnie Zacarias MD  •  famotidine (PEPCID) tablet 20 mg, 20  "mg, Oral, BID, Gene Lui MD, 20 mg at 20  •  furosemide (LASIX) injection 20 mg, 20 mg, Intravenous, Daily, Johnnie Zacarias MD, 20 mg at 20  •  labetalol (NORMODYNE,TRANDATE) injection 10 mg, 10 mg, Intravenous, Q6H PRN, Dav Fowler DO, 10 mg at 20  •  lisinopril (PRINIVIL,ZESTRIL) tablet 20 mg, 20 mg, Oral, Q12H, Dav Fowler DO, 20 mg at 20  •  melatonin tablet 6 mg, 6 mg, Oral, Nightly, Dav Fowler DO, 6 mg at 20  •  metoprolol tartrate (LOPRESSOR) tablet 25 mg, 25 mg, Oral, Q12H, Dav Fowler DO, 25 mg at 20  •  ondansetron (ZOFRAN) tablet 4 mg, 4 mg, Oral, Q6H PRN **OR** ondansetron (ZOFRAN) injection 4 mg, 4 mg, Intravenous, Q6H PRN, Gene Lui MD  •  [COMPLETED] Insert peripheral IV, , , Once **AND** sodium chloride 0.9 % flush 10 mL, 10 mL, Intravenous, PRN, Gene Lui MD  •  sodium chloride 0.9 % flush 10 mL, 10 mL, Intravenous, Q12H, Gene Lui MD, 10 mL at 20  •  sodium chloride 0.9 % flush 10 mL, 10 mL, Intravenous, PRN, Gene Lui MD  •  technetium sestamibi (CARDIOLITE) injection 1 dose, 1 dose, Intravenous, Once in imaging, Dav Fowler DO  •  vitamin C (ASCORBIC ACID) tablet 500 mg, 500 mg, Oral, Daily, Gene Lui MD, 500 mg at 20  •  zinc sulfate (ZINCATE) capsule 220 mg, 220 mg, Oral, Daily, Gene Lui MD, 220 mg at 20 0819      Review of Systems   Unable to perform ROS: Acuity of condition       Objective     Vital Signs:  Temp (24hrs), Av.1 °F (36.7 °C), Min:97.2 °F (36.2 °C), Max:98.8 °F (37.1 °C)      /73   Pulse 101   Temp 97.2 °F (36.2 °C) (Axillary)   Resp 28   Ht 170.2 cm (67\")   Wt 107 kg (235 lb 8 oz)   SpO2 95%   BMI 36.88 kg/m²         Physical Exam     General: The patient is sitting up in chair with Vapotherm at 40 L and 100%.  HEENT: Vapotherm in place.  Respiratory: Patient was " conversationally dyspneic when I called her on the phone.    Neuro: Alert and oriented  Psych: Pleasant and cooperative psych: Reported to be cooperative with staff.    Results Review:    I reviewed the patient's new clinical results.    Lab Results:  CBC:   Lab Results   Lab 12/02/20  1615 12/03/20  0448 12/04/20  0553 12/07/20  0312   WBC 6.36 3.48 7.29 10.38   HEMOGLOBIN 13.5 11.7* 13.1 12.1   HEMATOCRIT 41.4 37.1 40.9 37.8   PLATELETS 294 258 306 212         CMP:   Lab Results   Lab 12/07/20  0312 12/08/20  0620 12/09/20  0303   SODIUM 142 139 139   POTASSIUM 4.2 4.0 4.4   CHLORIDE 107 103 101   CO2 26.0 25.0 27.0   BUN 27* 31* 28*   CREATININE 0.52* 0.50* 0.53*   CALCIUM 9.2 9.3 9.1   BILIRUBIN 0.5 0.5 0.6   ALK PHOS 109 112 118*   ALT (SGPT) 14 12 14   AST (SGOT) 24 21 19   GLUCOSE 108* 116* 104*         Culture Results:        Microbiology Results Abnormal     Procedure Component Value - Date/Time    Blood Culture - Blood, Arm, Left [[id number]] Collected: 12/02/20 1615    Lab Status: Final result Specimen: Blood from Arm, Left Updated: 12/07/20 1700     Blood Culture No growth at 5 days    Blood Culture - Blood, Arm, Left [[id number]] Collected: 12/02/20 1618    Lab Status: Final result Specimen: Blood from Arm, Left Updated: 12/07/20 1700     Blood Culture No growth at 5 days    MRSA Screen, PCR (Inpatient) - Swab, Nares [[id number]]  (Abnormal) Collected: 12/06/20 2158    Lab Status: Final result Specimen: Swab from Nares Updated: 12/06/20 2335     MRSA PCR MRSA Detected    Urine Culture - Urine, Urine, Clean Catch [[id number]]  (Abnormal)  (Susceptibility) Collected: 12/03/20 1447    Lab Status: Final result Specimen: Urine, Clean Catch Updated: 12/06/20 0934     Urine Culture >100,000 CFU/mL Klebsiella pneumoniae ssp pneumoniae    Susceptibility      Klebsiella pneumoniae ssp pneumoniae     ARACELI     Ampicillin Resistant     Ampicillin + Sulbactam Susceptible     Cefazolin Susceptible     Cefepime Susceptible       Ceftazidime Susceptible     Ceftriaxone Susceptible     Gentamicin Susceptible     Levofloxacin Susceptible     Nitrofurantoin Intermediate     Piperacillin + Tazobactam Susceptible     Tetracycline Susceptible     Trimethoprim + Sulfamethoxazole Susceptible                    Legionella Antigen, Urine - Urine, Urine, Clean Catch [[id number]]  (Normal) Collected: 12/03/20 1447    Lab Status: Final result Specimen: Urine, Clean Catch Updated: 12/03/20 1535     LEGIONELLA ANTIGEN, URINE Negative    S. Pneumo Ag Urine or CSF - Urine, Urine, Clean Catch [[id number]]  (Normal) Collected: 12/03/20 1447    Lab Status: Final result Specimen: Urine, Clean Catch Updated: 12/03/20 1534     Strep Pneumo Ag Negative    Respiratory Panel, PCR (WITHOUT COVID) - Swab, Nasopharynx [[id number]]  (Normal) Collected: 12/03/20 1234    Lab Status: Final result Specimen: Swab from Nasopharynx Updated: 12/03/20 1351     ADENOVIRUS, PCR Not Detected     Coronavirus 229E Not Detected     Coronavirus HKU1 Not Detected     Coronavirus NL63 Not Detected     Coronavirus OC43 Not Detected     Human Metapneumovirus Not Detected     Human Rhinovirus/Enterovirus Not Detected     Influenza B PCR Not Detected     Parainfluenza Virus 1 Not Detected     Parainfluenza Virus 2 Not Detected     Parainfluenza Virus 3 Not Detected     Parainfluenza Virus 4 Not Detected     Bordetella pertussis pcr Not Detected     Influenza A H1 2009 PCR Not Detected     Chlamydophila pneumoniae PCR Not Detected     Mycoplasma pneumo by PCR Not Detected     Influenza A PCR Not Detected     Influenza A H3 Not Detected     Influenza A H1 Not Detected     RSV, PCR Not Detected     Bordetella parapertussis PCR Not Detected    Narrative:      The coronavirus on the RVP is NOT COVID-19 and is NOT indicative of infection with COVID-19.     COVID PRE-OP / PRE-PROCEDURE SCREENING ORDER (NO ISOLATION) - Swab, Nasopharynx [[id number]]  (Abnormal) Collected: 12/02/20 1728    Lab Status:  Final result Specimen: Swab from Nasopharynx Updated: 12/02/20 1825    Narrative:      The following orders were created for panel order COVID PRE-OP / PRE-PROCEDURE SCREENING ORDER (NO ISOLATION) - Swab, Nasopharynx.  Procedure                               Abnormality         Status                     ---------                               -----------         ------                     COVID-19, [facility] IN-HOUS...[[id number]]  Abnormal            Final result                 Please view results for these tests on the individual orders.    COVID-19, [facility] IN-HOUSE,NP Swab (NO TRANSPORT MEDIA) 2 HR TAT - Swab, Nasopharynx [[id number]]  (Abnormal) Collected: 12/02/20 1728    Lab Status: Final result Specimen: Swab from Nasopharynx Updated: 12/02/20 1825     COVID19 Detected    Narrative:      Fact sheet for providers: https://www.fda.gov/media/152003/download     Fact sheet for patients: https://www.fda.gov/media/717355/download                Radiology:   Imaging Results (Last 72 Hours)     Procedure Component Value Units Date/Time    XR Chest 1 View [[id number]] Collected: 12/07/20 2043     Updated: 12/07/20 2046    Narrative:      EXAMINATION:  XR CHEST 1 VW-  12/7/2020 8:28 PM CST     HISTORY: decreased O2 saturation; SOA; COVID +; U07.1-COVID-19     COMPARISON: 12/2/2020.     FINDINGS:  Bilateral infiltrates are not significantly changed. There is  continued cardiomegaly. No significant pleural effusion is seen.       Impression:      1. No significant change in bilateral infiltrates.  2. Cardiomegaly.        This report was finalized on 12/07/2020 20:43 by Dr. Isiah Benitez MD.          Assessment/Plan     Active Hospital Problems    Diagnosis   • **Pneumonia due to COVID-19 virus   • Acute respiratory distress syndrome (ARDS) due to COVID-19 virus (CMS/HCC)   • RITA (acute kidney injury) (CMS/HCC)   • COVID-19   • Acute respiratory failure with hypoxia (CMS/HCC)   • Benign essential hypertension   • Common  variable immunodeficiency (CMS/HCC)       IMPRESSION:  COVID-19 infection with pneumonia and acute respiratory failure-status post convalescent plasma.  Completed remdesivir.  Acute kidney injury-resolved  Elevated CRP  Elevated ferritin  Elevated D-dimer   elevated LDH  Pyuria with few squamous epithelial cells-urine culture with Klebsiella pneumoniae  History of common variable immunodeficiency but no treatment in the last 20 years  MRSA nasal screen positive      RECOMMENDATION:   · Discontinue ceftriaxone after dose today (will have completed 7 days)  · Continue anticoagulation per hospitalist  · Continue adjuvant therapy per hospitalist  · Up to chair when able  · Continue recommending proning  · If any change in condition, fever, concern for bacterial process, add vancomycin given MRSA nasal screen positive        Tressa Trevino MD  12/09/20  15:12 CST

## 2020-12-10 NOTE — CONSULTS
PULMONARY AND CRITICAL CARE CONSULT - [facility]    Radha Smiley   MR# [id number]  Acct# [id number]  12/10/2020   11:11 CST    Referring Provider: Johnnie Zacarias*    Chief Complaint: Acute respiratory failure due to SARS-CoV-2 infection    HPI: We are consulted by Johnnie Zacarias* to see this 55 y.o. female born on 1965.      Patient has presented with continuous worsening and severe shortness of breath in chest for 17 days in context Covid-19, with respiratory deterioration requiring escalation of oxygen therapy.  We have been asked to see her and manage her respiratory status.  She was admitted on December 2.  She has been requiring heated high flow 40 L and FiO2 100 since admission.  Infectious disease has been following along since admission due to her history of common variable immunodeficiency issues.  She is been receiving remdesivir, dexamethasone, and adjunct treatment for COVID-19.  She began imaging for assessment of anoxia.  Her low probability for PE.  She is been on anticoagulation.  Records indicate she is not been requiring IVIG in any years for treatment her her chronic immune issues.  She was placed on Rocephin and azithromycin for treatment of suspected pneumonia as well.  She is also being treated for appendiceal UTI.  She is no longer on any antibiotic therapy.  At some point she was desaturating despite 40 L and FiO2 100 and requiring nonrebreather in addition to this.  This was insufficient it appears sometime around December 6 or 7 began using BiPAP.  ABGs on December 7 on BiPAP 16/8 and FiO2 0.95 showed a PO2 of 60.  Today she is on BiPAP 20/10 FiO2 100 with a saturation of 92% and respiratory rate of 25.  Awaiting ABGs and updated chest x-ray for review.  Last to review are from December 7.  She had echocardiogram which was essentially negative.  Her BNP was not elevated.  She is awake and alert.  Nursing reports she has had little to no  nutrition or hydration due to ongoing need for BiPAP.  She saturates better sitting up in the chair.  They have been unable to get her back to the bed.  She is having no other issues other than a dry cough at this point.    Past Medical History   has a past medical history of Cellulitis and Hypertension.   has a past surgical history that includes Intraocular prosthesis insertion (Left).  Allergies   Allergen Reactions   • Demerol [Meperidine] Rash     Medications  atorvastatin, 10 mg, Oral, Nightly  cholecalciferol, 1,000 Units, Oral, Daily  cloNIDine, 0.1 mg, Oral, BID  dexamethasone, 10 mg, Intravenous, Daily  enoxaparin, 1 mg/kg, Subcutaneous, Q12H  enoxaparin, 60 mg, Subcutaneous, Once  famotidine, 20 mg, Oral, BID  lisinopril, 20 mg, Oral, Q12H  melatonin, 6 mg, Oral, Nightly  metoprolol tartrate, 25 mg, Oral, Q12H  sodium chloride, 10 mL, Intravenous, Q12H  technetium sestamibi, 1 dose, Intravenous, Once in imaging  vitamin C, 500 mg, Oral, Daily  zinc sulfate, 220 mg, Oral, Daily      Pharmacy to Dose enoxaparin (LOVENOX),       Social History   reports that she has quit smoking. She does not have any smokeless tobacco history on file. She reports previous alcohol use.  Family History  family history includes Diabetes in her brother and sister.  Review of Systems:  Review of Systems   Constitutional: Positive for fatigue. Negative for chills and fever.   HENT: Positive for congestion. Negative for rhinorrhea, sinus pressure and sinus pain.    Respiratory: Positive for cough and shortness of breath.    Cardiovascular: Negative for chest pain, palpitations and leg swelling.   Gastrointestinal: Negative for abdominal distention, constipation, diarrhea and nausea.   Endocrine: Negative for polydipsia, polyphagia and polyuria.   Genitourinary: Positive for dysuria, frequency and urgency.   Musculoskeletal: Negative for arthralgias, back pain and gait problem.   Skin: Negative for color change, pallor and rash.      Allergic/Immunologic: Negative for environmental allergies, food allergies and immunocompromised state.   Neurological: Positive for weakness. Negative for dizziness and speech difficulty.   Hematological: Negative for adenopathy. Does not bruise/bleed easily.   Psychiatric/Behavioral: Negative for agitation and hallucinations. The patient is not nervous/anxious and is not hyperactive.       Physical Exam:  Temp:  [97.2 °F (36.2 °C)-98.1 °F (36.7 °C)] 98 °F (36.7 °C)  Heart Rate:  [] 114  Resp:  [27-31] 27  BP: ()/() 111/84    Intake/Output Summary (Last 24 hours) at 12/10/2020 1111  Last data filed at 12/10/2020 0650  Gross per 24 hour   Intake --   Output 900 ml   Net -900 ml         12/08/20  1335 12/09/20  0316   Weight: 108 kg (239 lb) 107 kg (235 lb 8 oz)     SpO2 Percentage    12/10/20 0600 12/10/20 0630 12/10/20 0700   SpO2: 94% 91% 93%     GENERAL/CONSTITUTIONAL: Mild to moderate distress.  HEENT: atraumatic, normocephalic, BiPAP  NOSE: normal  NECK: jugular veins nondistended  CHEST: Mild paradox, no retractions, respiratory rate 25  CARDIAC: Sinus rhythm, rate 108  ABDOMEN: nondistended  : Deferred  EXTREMITIES: Lower extremity edema.  NEURO:  awake. No seizure activity. No obvious cranial nerve abnormality  SKIN: no jaundice.  No rash      Results from last 7 days   Lab Units 12/07/20  0312 12/04/20  0553   WBC 10*3/mm3 10.38 7.29   HEMOGLOBIN g/dL 12.1 13.1   PLATELETS 10*3/mm3 212 306     Results from last 7 days   Lab Units 12/10/20  0259 12/09/20  0303 12/08/20  1133 12/08/20  0620 12/07/20  0312  12/05/20  0223   SODIUM mmol/L 140 139  --  139 142   < > 142   POTASSIUM mmol/L 4.6 4.4  --  4.0 4.2   < > 3.9   CO2 mmol/L 28.0 27.0  --  25.0 26.0   < > 28.0   BUN mg/dL 35* 28*  --  31* 27*   < > 42*   CREATININE mg/dL 0.51* 0.53*  --  0.50* 0.52*   < > 0.58   MAGNESIUM mg/dL  --   --   --   --  2.1  --  2.4   PHOSPHORUS mg/dL  --   --   --   --  3.2  --  3.1   GLUCOSE mg/dL 118*  104*  --  116* 108*   < > 121*   CRP mg/dL  --  18.57*  --   --   --   --   --    PROCALCITONIN ng/mL  --  0.09  --   --   --   --   --    FERRITIN ng/mL  --  689.60*  --   --   --   --   --    D DIMER QUANT mg/L (FEU)  --   --  19.30*  --   --   --   --     < > = values in this interval not displayed.     Results from last 7 days   Lab Units 12/07/20  0410 12/06/20  1035 12/06/20  0435   PH, ARTERIAL pH units 7.431 7.452* 7.457*   PCO2, ARTERIAL mm Hg 41.1 38.7 38.5   PO2 ART mm Hg 60.5* 52.4* 60.3*   FIO2 % 95 100 100     Urine Culture   Date Value Ref Range Status   12/03/2020 (A)  Final    >100,000 CFU/mL Klebsiella pneumoniae ssp pneumoniae     Lab Results   Component Value Date    PROBNP 288.6 12/08/2020         Recent radiology:   Imaging Results (Last 72 Hours)     Procedure Component Value Units Date/Time    XR Chest 1 View [[id number]] Collected: 12/07/20 2043     Updated: 12/07/20 2046    Narrative:      EXAMINATION:  XR CHEST 1 VW-  12/7/2020 8:28 PM CST     HISTORY: decreased O2 saturation; SOA; COVID +; U07.1-COVID-19     COMPARISON: 12/2/2020.     FINDINGS:  Bilateral infiltrates are not significantly changed. There is  continued cardiomegaly. No significant pleural effusion is seen.       Impression:      1. No significant change in bilateral infiltrates.  2. Cardiomegaly.        This report was finalized on 12/07/2020 20:43 by Dr. Isiah Benitez MD.        My radiograph interpretation/independent review of imaging: Waiting on updated x-ray for today, chest x-ray from December 7 show cardiomegaly and bilateral interstitial infiltrates consistent with COVID-19    Other test results (not lab or imaging):   Results for orders placed during the hospital encounter of 12/02/20   Adult Transthoracic Echo Complete W/ Cont if Necessary Per Protocol    Narrative · Left ventricular ejection fraction appears to be 66 - 70%. Left   ventricular systolic function is normal.  · Left ventricular diastolic function was  normal.  · No evidence of pulmonary hypertension is present.      Normal ejection fraction, no diastolic dysfunction, no pulmonary hypertension    Independent review of ekg: Not applicable    Problem List as identified by Epic (may contain historical, inactive problems)  Patient Active Problem List   Diagnosis   • COVID-19   • Benign essential hypertension   • Common variable immunodeficiency (CMS/HCC)   • Pneumonia due to COVID-19 virus   • Acute respiratory failure with hypoxia (CMS/HCC)   • RITA (acute kidney injury) (CMS/HCC)   • Acute respiratory distress syndrome (ARDS) due to COVID-19 virus (CMS/HCC)     Pulmonary Assessment:  Acute respiratory failure due to Covid-19.  Pt has failed pulmonary system.  This is a threat to life or pulmonary function, high risk, due to Covid-19 infection and failure of pulmonary system    New problem (to me), with additional workup planned: Acute respiratory failure with hypoxia    New problem (to me), no additional workup planned: Covid-19 with adjuvant therapies per others    Other problems either stable, failing to improve or worsenin. Common variable immunodeficiency  2. UTI, Klebsiella, treated  3. Acute kidney injury    Recommend/plan:   Continue oxygen supplementation for saturation goal of 90%, currently on BiPAP 20/10 FiO2 100 with a saturation 92, increased from 16/8 and FiO2 0.95 previously  Vapotherm 40 L +1 100+ nonrebreather when not on BiPAP, cannot tolerate frequently  Chest X-ray as indicated, with judicious use to minimize exposure risk to hospital personnel, ordered for now  Arterial blood gas analysis as indicated, ordered for now  Aggressive DVT prophylaxis; Lovenox  Proning candidate: Yes, currently unable to do so on current BiPAP settings, if she requires intubation I recommended  Dexamethasone day 2 of 10, on second round initiated by hospitalist    Additional plan:    · Patient unable to receive adequate nutrition and oral hydration she has been  unable, with the BiPAP.  Discussed with nursing.  Will review ABG however I suspect she is going to require intubation mechanical ventilation  · Stress ulcer prophylaxis, Pepcid  · As needed bronchodilators  · Prognosis very guarded    Thank you for this consult.  We will follow along.  Electronically signed by RIKA Garnica on 12/10/2020 at 11:11 CST     ATTESTATION OF CLINICAL NOTE:  I have reviewed the notes, assessments, and/or procedures performed by Kaushik Curry, I concur with her/his documentation of Radha Smiley.  She was seen from outside the glass door risk of cross infection and exposure and to minimize use of PPE.    Patient is a 55 years old  female.  She is a former smoker and probably has underlying COPD but she was not on any home inhalers or nebulizers.  History of comfortably 1 deficiency syndrome.  She presented to the hospital on 2nd of December 2020 with a 10-day history of cough congestion upper respiratory syndrome.  She was diagnosed with COVID-19 about a week before Thanksgiving and due to worsening symptoms she came to the hospital.  She was initially stable but her oxygen requirement gradually increased and she needed Vapotherm or heated high flow oxygen with 40 L flow and 100% FiO2 with 15 L nonrebreather mask.  She received a full course of remdesivir dexamethasone and other treatment for COVID-19.  She had low probability for pulmonary embolism and currently on anticoagulation.  She has not been receiving intravenous immunoglobulin for the common variable immune deficiency syndrome.  She received a course of azithromycin and Rocephin for suspected pneumonia and also treated for urinary tract infection and completed her antibiotic treatment.    She is on BiPAP 20/10 with 100% FiO2 and oxygen saturation 92% and respiratory rate was 25.  Patient had a x-ray done today which shows persistent bilateral airspace consolidation and cardiomegaly with improvement in  the aeration in the left upper lung.  Her blood gas drawn this morning showed pH 7.42 PCO2 is 42 PO2 was 53.7 bicarbonate 28.3 oxygen saturation 89%.  This is on BiPAP with 100% oxygen.  She was moved to the intensive care unit with anticipation for possible intubation mechanical ventilation and pulmonary team was consulted.  Patient was otherwise alert awake and stable and looks comfortable.  She did not have enough hydration or nutrition for the last few days due to BiPAP dependence according to the records.    As per nursing assessment and clinical evaluation patient is a obese  female sitting up in no distress.  She is currently on BiPAP HEENT: Atraumatic normocephalic.  Neck: Supple no mass or jugular venous distention.  Heart: Sounds normal no gallop or murmur.  Lungs: Bilateral crackles and diminished air entry no wheezing.  Abdomen: Soft nondistended nontender.  Extremities: Trace ankle edema normal pulses color.  Neurologic: Grossly intact skin: No major breakdown.                                                                                                                   Continue patient with bronchodilator treatment, routine respiratory care and pulmonary toilet.  She is currently BiPAP dependent and still showing significant hypoxia in the blood gas.  If the respiratory status worsened or the patient gets tired she may need intubation mechanical ventilation.  She already completed remdesivir and currently on dexamethasone vitamin C and zinc.  Continue DVT and stress ulcer prophylaxis and pain anxiety control.  Continue current treatment plan supportive care.  She will need repeat labs and imaging studies and blood gas from time to time.  Physical activity as tolerated.  If patient is unable to eat anything orally we may consider doing a small bore Dobbhoff tube placement and start her tube feeding.  Pulmonary team will continue following her and make further recommendations.  We appreciate  the consult from the hospitalist team and will continue to follow.  Total time spent in seeing this patient in pulmonary consult was 45 minutes.. CODE STATUS: Full.    Care plan discussed with RN,RT and APRN. I have seen and examined patient personally, performing a face-to-face diagnostic evaluation with plan of care reviewed and developed with APRN and nursing staff. I have addended and/or modified the above history of present illness, physical examination, and assessment and plan to reflect my findings and impressions. Essential elements of the care plan were discussed with APRN above.  Agree with findings and assessment/plan as documented above.    Misty Holt MD  Pulmonologist/Intensivist  12/10/2020 14:21 CST

## 2020-12-10 NOTE — PAYOR COMM NOTE
"AUTH: [id number]    Rashad Smiley (55 y.o. Female)     Date of Birth Social Security Number Address Home Phone MRN    1965  [id number] [address] [phone] [id number]    Jain Marital Status          Other        Admission Date Admission Type Admitting Provider Attending Provider Department, Room/Bed    12/2/20 Emergency Johnnie Zacarias MD [place], Glenn Riego, MD [facility] INTENSIVE CARE, I016/1    Discharge Date Discharge Disposition Discharge Destination                       Attending Provider: Johnnie Zacarias MD    Allergies: Demerol [Meperidine]    Isolation: Enh Drop/Con   Infection: COVID (confirmed) (12/02/20), MRSA (12/06/20)   Code Status: CPR    Ht: 170.2 cm (67\")   Wt: 107 kg (235 lb 8 oz)    Admission Cmt: None   Principal Problem: Pneumonia due to COVID-19 virus [U07.1,J12.89]                 Active Insurance as of 12/2/2020     Primary Coverage     Payor Plan Insurance Group Employer/Plan Group    [employer]HE[employer] BLUE CROSS AN[employer]M BLUE CROSS BLUE SHIELD PPO [id number]     Payor Plan Address Payor Plan Phone Number Payor Plan Fax Number Effective Dates    [address] [phone]  11/1/2020 - None Entered    [place] [address]       Subscriber Name Subscriber Birth Date Member ID       RASHAD SMILEY 1965 [id number]                 Emergency Contacts      (Rel.) Home Phone Work Phone Mobile Phone    LETTY HARDY (Spouse) -- -- [phone]    FLOR HER (Daughter) -- -- [phone]               Physician Progress Notes (last 24 hours) (Notes from 12/09/20 1039 through 12/10/20 1039)      Johnnie Zacarias MD at 12/10/20 0706              [facility] Medicine Services  INPATIENT PROGRESS NOTE    Patient Name: Rashad Smiley  Date of Admission: 12/2/2020  Today's Date: 12/10/20  Length of Stay: 8  Primary Care Physician: Adrien De Leon MD    Subjective  " "  Chief Complaint: Follow-up  HPI   Ceftriaxone discontinued December 9 -Klebsiella UTI  Completed remdesivir and received convalescent plasma  I did not see any nurses note from yesterday but discussed the case with nurse Scottie  Afebrile  Hemodynamically stable    I changed the past couple of days dexamethasone to higher dose as well as enoxaparin as I thought appropriate for her given condition.      Discussed with nurse Ricardo.  Patient unable to tolerate Vapotherm.  She at times uses Vapotherm with nonrebreather.  She can only maintain in the upper 70s using this.  She was placed on BiPAP and currently on 100% FiO2 with IPAP of 20 and to  EPAP of 10.  Review of Systems   Limited due to patient's BiPAP mask.  Would just give me a sign on her hands of \"so-so\"      Objective    Temp:  [97.2 °F (36.2 °C)-98.1 °F (36.7 °C)] 98 °F (36.7 °C)  Heart Rate:  [] 114  Resp:  [27-31] 27  BP: ()/() 111/84  Physical Exam   She looks weak.  She is tachypneic in the 20s, seated on the recliner  No gross accessory muscle use  GEN: a&Ox3; coherent, NAD, appears nontoxic  HEENT: Anicteric, trachea midline  Lungs: Equal chest rise bilaterally, increased respiratory rate but no overt accessory muscle use  heart: Tachycardic, regular rhythm  Abdomen: obese, soft, no gross hepatosplenomegaly  Extremities: No cyanosis or edema  Neuro: Moving all extremities with no obvious focal deficits         Results Review:  I have reviewed the labs, radiology results, and diagnostic studies.    Laboratory Data:   Results from last 7 days   Lab Units 12/07/20  0312 12/04/20  0553   WBC 10*3/mm3 10.38 7.29   HEMOGLOBIN g/dL 12.1 13.1   HEMATOCRIT % 37.8 40.9   PLATELETS 10*3/mm3 212 306        Results from last 7 days   Lab Units 12/10/20  0259 12/09/20  0303 12/08/20  0620   SODIUM mmol/L 140 139 139   POTASSIUM mmol/L 4.6 4.4 4.0   CHLORIDE mmol/L 102 101 103   CO2 mmol/L 28.0 27.0 25.0   BUN mg/dL 35* 28* 31*   CREATININE mg/dL " 0.51* 0.53* 0.50*   CALCIUM mg/dL 9.4 9.1 9.3   BILIRUBIN mg/dL 0.5 0.6 0.5   ALK PHOS U/L 96 118* 112   ALT (SGPT) U/L 12 14 12   AST (SGOT) U/L 19 19 21   GLUCOSE mg/dL 118* 104* 116*       Culture Data:   Urine Culture   Date Value Ref Range Status   12/03/2020 (A)  Final    >100,000 CFU/mL Klebsiella pneumoniae ssp pneumoniae       Radiology Data:   Imaging Results (Last 24 Hours)     ** No results found for the last 24 hours. **          I have reviewed the patient's current medications.     Assessment/Plan     Active Hospital Problems    Diagnosis   • **Pneumonia due to COVID-19 virus   • Acute respiratory distress syndrome (ARDS) due to COVID-19 virus (CMS/HCC)   • RITA (acute kidney injury) (CMS/HCC)   • COVID-19   • Acute respiratory failure with hypoxia (CMS/HCC)   • Benign essential hypertension   • Common variable immunodeficiency (CMS/HCC)     · Continue oxygen supplementation BIPAP or vapotherm to keep spo between 90-94%  · d9 decadron + famotidine. I increased this 12/8 as she remians to have high o2 requirement  · dvt proph; continue other supportive care  · She has been getting lasix 20 mg since the 7th of Dec.   · remdesivir completed on 12/7  · Patient is on ceftriaxone (day 6) for Klebsiella pneumonia urinary tract infection  · I encouraged the patient to sleep on the chair most of the time if tolerated unless going to sleep.  If she is on bed I encouraged her to prone herself during sleep. I reiterated this yesterday.  · Echo revewed:  Interpretation Summary     · Left ventricular ejection fraction appears to be 66 - 70%. Left ventricular systolic function is normal.  · Left ventricular diastolic function was normal.  · No evidence of pulmonary hypertension is present.            CRP 18.6 (8.9)  Ferritin 689 (738.9)   (628)  Procalcitonin 0.09  D-dimer 19.3 (0.52) -she had a VQ scan on December 3 that was read as low probability for pulmonary embolism.  It appeared that she had acute  kidney injury on admit where her creatinine was 1.52.  Her creatinine remained stable  She had a good urine output of 1300 yesterday.  Creatinine remained stable so is her electrolytes.  Labs today looks okay    atorvastatin, 10 mg, Oral, Nightly  cholecalciferol, 1,000 Units, Oral, Daily  cloNIDine, 0.1 mg, Oral, BID  dexamethasone, 10 mg, Intravenous, Daily  enoxaparin, 40 mg, Subcutaneous, Q12H  famotidine, 20 mg, Oral, BID  lisinopril, 20 mg, Oral, Q12H  melatonin, 6 mg, Oral, Nightly  metoprolol tartrate, 25 mg, Oral, Q12H  sodium chloride, 10 mL, Intravenous, Q12H  technetium sestamibi, 1 dose, Intravenous, Once in imaging  vitamin C, 500 mg, Oral, Daily  zinc sulfate, 220 mg, Oral, Daily      Patient remains afebrile.  She received ceftriaxone.  Her O2 requirement increased.  We discussed about anticoagulation and given worsening condition in terms of oxygenation as well as hypercoagulable state that is associated with COVID-19, she is agreeable to pursue a 1 mg/kg treatment dose of Lovenox.  I will check for chest x-ray  As said earlier I increased dexamethasone couple of days ago.  If she is showing some signs of fatigue she may need to be intubated and hooked on mechanical ventilator.  We will check with pulmonary for any other recommendation  She is full code    Discharge Planning: To be determined     electronically signed by Johnnie Zacarias MD, 12/10/20, 07:35 CST.      Electronically signed by Johnnie Zacarias MD at 12/10/20 0934     Tressa Tervino MD at 12/09/20 1512          INFECTIOUS DISEASES PROGRESS NOTE    Patient:  Radha Smiley  YOB: 1965  MRN: [id number]   Admit date: 12/2/2020   Admitting Physician: Johnnie Zacarias*  Primary Care Physician: Adrien De Leon MD    Chief Complaint:  SOA      Interval History: Reviewed with JEAN Rubin.  Patient has been doing better up in the chair.  She is on Vapotherm but has been on BiPAP quite a  bit    Sats were in the upper 80s initially when I was there but drifted down to the upper 70s briefly.    Patient reported that she felt stronger          Allergies:   Allergies   Allergen Reactions   • Demerol [Meperidine] Rash       Current Meds:     Current Facility-Administered Medications:   •  acetaminophen (TYLENOL) tablet 650 mg, 650 mg, Oral, Q4H PRN, 650 mg at 12/09/20 0829 **OR** acetaminophen (TYLENOL) 160 MG/5ML solution 650 mg, 650 mg, Oral, Q4H PRN **OR** acetaminophen (TYLENOL) suppository 650 mg, 650 mg, Rectal, Q4H PRN, Gene Lui MD  •  atorvastatin (LIPITOR) tablet 10 mg, 10 mg, Oral, Nightly, Gene Lui MD, 10 mg at 12/08/20 2027  •  benzonatate (TESSALON) capsule 200 mg, 200 mg, Oral, Q4H PRN, Gene Lui MD, 200 mg at 12/05/20 2016  •  cefTRIAXone (ROCEPHIN) 1 g/100 mL 0.9% NS (MBP), 1 g, Intravenous, Q24H, Tressa Trevino MD, 1 g at 12/08/20 1721  •  cholecalciferol (VITAMIN D3) tablet 1,000 Units, 1,000 Units, Oral, Daily, Gene Lui MD, 1,000 Units at 12/09/20 0819  •  cloNIDine (CATAPRES) tablet 0.1 mg, 0.1 mg, Oral, BID, Leidy Joe DO, 0.1 mg at 12/09/20 0819  •  dexamethasone sodium phosphate injection 10 mg, 10 mg, Intravenous, Daily, Johnnie Zacarias MD, 10 mg at 12/09/20 0819  •  dextromethorphan polistirex ER (DELSYM) 30 MG/5ML oral suspension 60 mg, 10 mL, Oral, Q12H PRN, Gene Lui MD  •  enoxaparin (LOVENOX) syringe 40 mg, 40 mg, Subcutaneous, Q12H, Johnnie Zacarias MD  •  famotidine (PEPCID) tablet 20 mg, 20 mg, Oral, BID, Gene Lui MD, 20 mg at 12/09/20 0819  •  furosemide (LASIX) injection 20 mg, 20 mg, Intravenous, Daily, Johnnie Zacarias MD, 20 mg at 12/09/20 0819  •  labetalol (NORMODYNE,TRANDATE) injection 10 mg, 10 mg, Intravenous, Q6H PRN, Dav Fowler DO, 10 mg at 12/09/20 0654  •  lisinopril (PRINIVIL,ZESTRIL) tablet 20 mg, 20 mg, Oral, Q12H, Dav Fowler DO, 20 mg at  "20  •  melatonin tablet 6 mg, 6 mg, Oral, Nightly, Dav Fowler DO, 6 mg at 20  •  metoprolol tartrate (LOPRESSOR) tablet 25 mg, 25 mg, Oral, Q12H, Dav Fowler DO, 25 mg at 20  •  ondansetron (ZOFRAN) tablet 4 mg, 4 mg, Oral, Q6H PRN **OR** ondansetron (ZOFRAN) injection 4 mg, 4 mg, Intravenous, Q6H PRN, Gene Lui MD  •  [COMPLETED] Insert peripheral IV, , , Once **AND** sodium chloride 0.9 % flush 10 mL, 10 mL, Intravenous, PRN, Gene Lui MD  •  sodium chloride 0.9 % flush 10 mL, 10 mL, Intravenous, Q12H, Gene Lui MD, 10 mL at 20  •  sodium chloride 0.9 % flush 10 mL, 10 mL, Intravenous, PRN, Gene Lui MD  •  technetium sestamibi (CARDIOLITE) injection 1 dose, 1 dose, Intravenous, Once in imaging, Dav Fowler DO  •  vitamin C (ASCORBIC ACID) tablet 500 mg, 500 mg, Oral, Daily, Gene Lui MD, 500 mg at 20  •  zinc sulfate (ZINCATE) capsule 220 mg, 220 mg, Oral, Daily, Gene Lui MD, 220 mg at 20      Review of Systems   Unable to perform ROS: Acuity of condition       Objective     Vital Signs:  Temp (24hrs), Av.1 °F (36.7 °C), Min:97.2 °F (36.2 °C), Max:98.8 °F (37.1 °C)      /73   Pulse 101   Temp 97.2 °F (36.2 °C) (Axillary)   Resp 28   Ht 170.2 cm (67\")   Wt 107 kg (235 lb 8 oz)   SpO2 95%   BMI 36.88 kg/m²         Physical Exam     General: The patient is sitting up in chair with Vapotherm at 40 L and 100%.  HEENT: Vapotherm in place.  Respiratory: Patient was conversationally dyspneic when I called her on the phone.    Neuro: Alert and oriented  Psych: Pleasant and cooperative psych: Reported to be cooperative with staff.    Results Review:    I reviewed the patient's new clinical results.    Lab Results:  CBC:   Lab Results   Lab 20  1615 20  0448 20  0553 20  0312   WBC 6.36 3.48 7.29 10.38   HEMOGLOBIN 13.5 11.7* 13.1 12.1   "   HEMATOCRIT 41.4 37.1 40.9 37.8   PLATELETS 294 258 306 212         CMP:   Lab Results   Lab 12/07/20  0312 12/08/20  0620 12/09/20  0303   SODIUM 142 139 139   POTASSIUM 4.2 4.0 4.4   CHLORIDE 107 103 101   CO2 26.0 25.0 27.0   BUN 27* 31* 28*   CREATININE 0.52* 0.50* 0.53*   CALCIUM 9.2 9.3 9.1   BILIRUBIN 0.5 0.5 0.6   ALK PHOS 109 112 118*   ALT (SGPT) 14 12 14   AST (SGOT) 24 21 19   GLUCOSE 108* 116* 104*         Culture Results:        Microbiology Results Abnormal     Procedure Component Value - Date/Time    Blood Culture - Blood, Arm, Left [[id number]] Collected: 12/02/20 1615    Lab Status: Final result Specimen: Blood from Arm, Left Updated: 12/07/20 1700     Blood Culture No growth at 5 days    Blood Culture - Blood, Arm, Left [[id number]] Collected: 12/02/20 1618    Lab Status: Final result Specimen: Blood from Arm, Left Updated: 12/07/20 1700     Blood Culture No growth at 5 days    MRSA Screen, PCR (Inpatient) - Swab, Nares [[id number]]  (Abnormal) Collected: 12/06/20 2158    Lab Status: Final result Specimen: Swab from Nares Updated: 12/06/20 2335     MRSA PCR MRSA Detected    Urine Culture - Urine, Urine, Clean Catch [[id number]]  (Abnormal)  (Susceptibility) Collected: 12/03/20 1447    Lab Status: Final result Specimen: Urine, Clean Catch Updated: 12/06/20 0934     Urine Culture >100,000 CFU/mL Klebsiella pneumoniae ssp pneumoniae    Susceptibility      Klebsiella pneumoniae ssp pneumoniae     ARACELI     Ampicillin Resistant     Ampicillin + Sulbactam Susceptible     Cefazolin Susceptible     Cefepime Susceptible     Ceftazidime Susceptible     Ceftriaxone Susceptible     Gentamicin Susceptible     Levofloxacin Susceptible     Nitrofurantoin Intermediate     Piperacillin + Tazobactam Susceptible     Tetracycline Susceptible     Trimethoprim + Sulfamethoxazole Susceptible                    Legionella Antigen, Urine - Urine, Urine, Clean Catch [[id number]]  (Normal) Collected: 12/03/20 1447    Lab Status:  Final result Specimen: Urine, Clean Catch Updated: 12/03/20 1535     LEGIONELLA ANTIGEN, URINE Negative    S. Pneumo Ag Urine or CSF - Urine, Urine, Clean Catch [[id number]]  (Normal) Collected: 12/03/20 1447    Lab Status: Final result Specimen: Urine, Clean Catch Updated: 12/03/20 1534     Strep Pneumo Ag Negative    Respiratory Panel, PCR (WITHOUT COVID) - Swab, Nasopharynx [[id number]]  (Normal) Collected: 12/03/20 1234    Lab Status: Final result Specimen: Swab from Nasopharynx Updated: 12/03/20 1351     ADENOVIRUS, PCR Not Detected     Coronavirus 229E Not Detected     Coronavirus HKU1 Not Detected     Coronavirus NL63 Not Detected     Coronavirus OC43 Not Detected     Human Metapneumovirus Not Detected     Human Rhinovirus/Enterovirus Not Detected     Influenza B PCR Not Detected     Parainfluenza Virus 1 Not Detected     Parainfluenza Virus 2 Not Detected     Parainfluenza Virus 3 Not Detected     Parainfluenza Virus 4 Not Detected     Bordetella pertussis pcr Not Detected     Influenza A H1 2009 PCR Not Detected     Chlamydophila pneumoniae PCR Not Detected     Mycoplasma pneumo by PCR Not Detected     Influenza A PCR Not Detected     Influenza A H3 Not Detected     Influenza A H1 Not Detected     RSV, PCR Not Detected     Bordetella parapertussis PCR Not Detected    Narrative:      The coronavirus on the RVP is NOT COVID-19 and is NOT indicative of infection with COVID-19.     COVID PRE-OP / PRE-PROCEDURE SCREENING ORDER (NO ISOLATION) - Swab, Nasopharynx [[id number]]  (Abnormal) Collected: 12/02/20 1728    Lab Status: Final result Specimen: Swab from Nasopharynx Updated: 12/02/20 1825    Narrative:      The following orders were created for panel order COVID PRE-OP / PRE-PROCEDURE SCREENING ORDER (NO ISOLATION) - Swab, Nasopharynx.  Procedure                               Abnormality         Status                     ---------                               -----------         ------                        COVID-19, [facility]BBOTT IN-HOUS...[[id number]]  Abnormal            Final result                 Please view results for these tests on the individual orders.    COVID-19, ABBOTT IN-HOUSE,NP Swab (NO TRANSPORT MEDIA) 2 HR TAT - Swab, Nasopharynx [[id number]]  (Abnormal) Collected: 12/02/20 1728    Lab Status: Final result Specimen: Swab from Nasopharynx Updated: 12/02/20 1825     COVID19 Detected    Narrative:      Fact sheet for providers: https://www.fda.gov/media/856877/download     Fact sheet for patients: https://www.fda.gov/media/048183/download                Radiology:   Imaging Results (Last 72 Hours)     Procedure Component Value Units Date/Time    XR Chest 1 View [[id number]] Collected: 12/07/20 2043     Updated: 12/07/20 2046    Narrative:      EXAMINATION:  XR CHEST 1 VW-  12/7/2020 8:28 PM CST     HISTORY: decreased O2 saturation; SOA; COVID +; U07.1-COVID-19     COMPARISON: 12/2/2020.     FINDINGS:  Bilateral infiltrates are not significantly changed. There is  continued cardiomegaly. No significant pleural effusion is seen.       Impression:      1. No significant change in bilateral infiltrates.  2. Cardiomegaly.        This report was finalized on 12/07/2020 20:43 by Dr. Isiah Benitez MD.          Assessment/Plan     Active Hospital Problems    Diagnosis   • **Pneumonia due to COVID-19 virus   • Acute respiratory distress syndrome (ARDS) due to COVID-19 virus (CMS/HCC)   • RITA (acute kidney injury) (CMS/HCC)   • COVID-19   • Acute respiratory failure with hypoxia (CMS/HCC)   • Benign essential hypertension   • Common variable immunodeficiency (CMS/HCC)       IMPRESSION:  COVID-19 infection with pneumonia and acute respiratory failure-status post convalescent plasma.  Completed remdesivir.  Acute kidney injury-resolved  Elevated CRP  Elevated ferritin  Elevated D-dimer   elevated LDH  Pyuria with few squamous epithelial cells-urine culture with Klebsiella pneumoniae  History of common variable  immunodeficiency but no treatment in the last 20 years  MRSA nasal screen positive      RECOMMENDATION:   · Discontinue ceftriaxone after dose today (will have completed 7 days)  · Continue anticoagulation per hospitalist  · Continue adjuvant therapy per hospitalist  · Up to chair when able  · Continue recommending proning  · If any change in condition, fever, concern for bacterial process, add vancomycin given MRSA nasal screen positive        Tressa Trevino MD  12/09/20  15:12 CST        Electronically signed by Tressa Trevino MD at 12/09/20 9135

## 2020-12-10 NOTE — PLAN OF CARE
Goal Outcome Evaluation:  Plan of Care Reviewed With: other (see comments)(RN)  Progress: declining  Outcome Summary: Cardiac diet. Oral intake likely inadequate due to vapotherm/bipap use. Last documented oral intake on 12/6 42% of three meals. If unable to tolerate oral intake, may benefit from AMONS. Cont to follow for plan of care.

## 2020-12-10 NOTE — PROGRESS NOTES
"    [facility] Medicine Services  INPATIENT PROGRESS NOTE    Patient Name: Radha Smiley  Date of Admission: 12/2/2020  Today's Date: 12/10/20  Length of Stay: 8  Primary Care Physician: Adrien De Leon MD    Subjective   Chief Complaint: Follow-up  HPI   Ceftriaxone discontinued December 9 -Klebsiella UTI  Completed remdesivir and received convalescent plasma  I did not see any nurses note from yesterday but discussed the case with nurse Scottie  Afebrile  Hemodynamically stable    I changed the past couple of days dexamethasone to higher dose as well as enoxaparin as I thought appropriate for her given condition.      Discussed with nurse Ricardo.  Patient unable to tolerate Vapotherm.  She at times uses Vapotherm with nonrebreather.  She can only maintain in the upper 70s using this.  She was placed on BiPAP and currently on 100% FiO2 with IPAP of 20 and to  EPAP of 10.  Review of Systems   Limited due to patient's BiPAP mask.  Would just give me a sign on her hands of \"so-so\"      Objective    Temp:  [97.2 °F (36.2 °C)-98.1 °F (36.7 °C)] 98 °F (36.7 °C)  Heart Rate:  [] 114  Resp:  [27-31] 27  BP: ()/() 111/84  Physical Exam   She looks weak.  She is tachypneic in the 20s, seated on the recliner  No gross accessory muscle use  GEN: a&Ox3; coherent, NAD, appears nontoxic  HEENT: Anicteric, trachea midline  Lungs: Equal chest rise bilaterally, increased respiratory rate but no overt accessory muscle use  heart: Tachycardic, regular rhythm  Abdomen: obese, soft, no gross hepatosplenomegaly  Extremities: No cyanosis or edema  Neuro: Moving all extremities with no obvious focal deficits         Results Review:  I have reviewed the labs, radiology results, and diagnostic studies.    Laboratory Data:   Results from last 7 days   Lab Units 12/07/20  0312 12/04/20  0553   WBC 10*3/mm3 10.38 7.29   HEMOGLOBIN g/dL 12.1 13.1   HEMATOCRIT % 37.8 40.9   PLATELETS " 10*3/mm3 212 306        Results from last 7 days   Lab Units 12/10/20  0259 12/09/20  0303 12/08/20  0620   SODIUM mmol/L 140 139 139   POTASSIUM mmol/L 4.6 4.4 4.0   CHLORIDE mmol/L 102 101 103   CO2 mmol/L 28.0 27.0 25.0   BUN mg/dL 35* 28* 31*   CREATININE mg/dL 0.51* 0.53* 0.50*   CALCIUM mg/dL 9.4 9.1 9.3   BILIRUBIN mg/dL 0.5 0.6 0.5   ALK PHOS U/L 96 118* 112   ALT (SGPT) U/L 12 14 12   AST (SGOT) U/L 19 19 21   GLUCOSE mg/dL 118* 104* 116*       Culture Data:   Urine Culture   Date Value Ref Range Status   12/03/2020 (A)  Final    >100,000 CFU/mL Klebsiella pneumoniae ssp pneumoniae       Radiology Data:   Imaging Results (Last 24 Hours)     ** No results found for the last 24 hours. **          I have reviewed the patient's current medications.     Assessment/Plan     Active Hospital Problems    Diagnosis   • **Pneumonia due to COVID-19 virus   • Acute respiratory distress syndrome (ARDS) due to COVID-19 virus (CMS/HCC)   • RITA (acute kidney injury) (CMS/HCC)   • COVID-19   • Acute respiratory failure with hypoxia (CMS/HCC)   • Benign essential hypertension   • Common variable immunodeficiency (CMS/HCC)     · Continue oxygen supplementation BIPAP or vapotherm to keep spo between 90-94%  · d9 decadron + famotidine. I increased this 12/8 as she remians to have high o2 requirement  · dvt proph; continue other supportive care  · She has been getting lasix 20 mg since the 7th of Dec.   · remdesivir completed on 12/7  · Patient is on ceftriaxone (day 6) for Klebsiella pneumonia urinary tract infection  · I encouraged the patient to sleep on the chair most of the time if tolerated unless going to sleep.  If she is on bed I encouraged her to prone herself during sleep. I reiterated this yesterday.  · Echo revewed:  Interpretation Summary     · Left ventricular ejection fraction appears to be 66 - 70%. Left ventricular systolic function is normal.  · Left ventricular diastolic function was normal.  · No evidence of  pulmonary hypertension is present.            CRP 18.6 (8.9)  Ferritin 689 (738.9)   (628)  Procalcitonin 0.09  D-dimer 19.3 (0.52) -she had a VQ scan on December 3 that was read as low probability for pulmonary embolism.  It appeared that she had acute kidney injury on admit where her creatinine was 1.52.  Her creatinine remained stable  She had a good urine output of 1300 yesterday.  Creatinine remained stable so is her electrolytes.  Labs today looks okay    atorvastatin, 10 mg, Oral, Nightly  cholecalciferol, 1,000 Units, Oral, Daily  cloNIDine, 0.1 mg, Oral, BID  dexamethasone, 10 mg, Intravenous, Daily  enoxaparin, 40 mg, Subcutaneous, Q12H  famotidine, 20 mg, Oral, BID  lisinopril, 20 mg, Oral, Q12H  melatonin, 6 mg, Oral, Nightly  metoprolol tartrate, 25 mg, Oral, Q12H  sodium chloride, 10 mL, Intravenous, Q12H  technetium sestamibi, 1 dose, Intravenous, Once in imaging  vitamin C, 500 mg, Oral, Daily  zinc sulfate, 220 mg, Oral, Daily      Patient remains afebrile.  She received ceftriaxone.  Her O2 requirement increased.  We discussed about anticoagulation and given worsening condition in terms of oxygenation as well as hypercoagulable state that is associated with COVID-19, she is agreeable to pursue a 1 mg/kg treatment dose of Lovenox.  I will check for chest x-ray  As said earlier I increased dexamethasone couple of days ago.  If she is showing some signs of fatigue she may need to be intubated and hooked on mechanical ventilator.  We will check with pulmonary for any other recommendation  She is full code  Critical condition  Discharge Planning: To be determined     electronically signed by Johnnie Zacarias MD, 12/10/20, 07:35 CST.      Patient desaturates in the 80's on 100% fio2 on BIPAP. She is  now showing signs of fatigue.  After discussing option, she agreed to get intubated to get ventilatory support.  I spoke to family through Facetime.  They are agreeable as well.     We  positioned the patient. See procedure note for details.     Noted thick tenacious mucus on my visual field.  I grabbed it with Bryan.    I directly visualized the larynx  Initial setting discussed with RT Karo (20rr, 100%, PEEP 8 and ). I will defer further management with Pulmonary   Equal breath sound  o2 sat was 91 before I left her room.   cxr pending   total cumulative critical  time spent > 50 minutes separate from procedure done at bedside.    Electronically signed by Johnnie Zacarias MD, [unfilled], 18:08 CST.      Cxr_ appropriate position of et tube. ARDS image.    Will give 4 mg iv versed now then start on drip for sedation and sync with vent as precedex is not enough.    Will get vasc team for a line    Electronically signed by Johnnie Zacarias MD, [unfilled], 18:13 CST.

## 2020-12-10 NOTE — PROGRESS NOTES
Spoke with patient's daughter Eladia Lord, [phone], regarding obtaining POA for patient. Pt's daughter already has papers drawn up for medical and financial POA. Spoke with Diana Lewis, in risk to see how to get POA papers signed and notarized since patient has COVID. Per risk, the daughter would need to bring two witnesses and notary to hospital and they could complete paperwork thru the glass doors. Informed patient's daughter of this and she is working on obtaining witnesses and notary and will come to hospital to have this completed. Informed nurse of this plan and they are aware that daughter will call once she is here.

## 2020-12-10 NOTE — PROGRESS NOTES
INFECTIOUS DISEASES PROGRESS NOTE    Patient:  Radha Smiley  YOB: 1965  MRN: [id number]   Admit date: 12/2/2020   Admitting Physician: Johnnie Zacarias*  Primary Care Physician: Adrien De Leon MD    Chief Complaint:  SOA      Interval History: Reviewed with JEAN Silva.  Patient no longer tolerating Vapotherm and has been on BiPAP essentially maxed out.    There have been discussions with her again today about the next step begin ventilator        Allergies:   Allergies   Allergen Reactions   • Demerol [Meperidine] Rash       Current Meds:     Current Facility-Administered Medications:   •  acetaminophen (TYLENOL) tablet 650 mg, 650 mg, Oral, Q4H PRN, 650 mg at 12/09/20 0829 **OR** acetaminophen (TYLENOL) 160 MG/5ML solution 650 mg, 650 mg, Oral, Q4H PRN **OR** acetaminophen (TYLENOL) suppository 650 mg, 650 mg, Rectal, Q4H PRN, Dav Fowler DO  •  atorvastatin (LIPITOR) tablet 10 mg, 10 mg, Oral, Nightly, Dav Fowler DO, 10 mg at 12/09/20 2108  •  benzonatate (TESSALON) capsule 200 mg, 200 mg, Oral, Q4H PRN, Dav Fowler DO, 200 mg at 12/05/20 2016  •  cholecalciferol (VITAMIN D3) tablet 1,000 Units, 1,000 Units, Oral, Daily, Dav Fowler DO, 1,000 Units at 12/10/20 0812  •  cloNIDine (CATAPRES) tablet 0.1 mg, 0.1 mg, Oral, BID, Leidy Joe DO, 0.1 mg at 12/10/20 0812  •  dexamethasone sodium phosphate injection 10 mg, 10 mg, Intravenous, Daily, Johnnie Zacarias MD, 10 mg at 12/10/20 0812  •  dextromethorphan polistirex ER (DELSYM) 30 MG/5ML oral suspension 60 mg, 10 mL, Oral, Q12H PRN, Dav Fowler DO  •  enoxaparin (LOVENOX) syringe 110 mg, 1 mg/kg, Subcutaneous, Q12H, Johnnie Zacarias MD  •  famotidine (PEPCID) tablet 20 mg, 20 mg, Oral, BID, Dav Fowler DO, 20 mg at 12/10/20 0812  •  labetalol (NORMODYNE,TRANDATE) injection 10 mg, 10 mg, Intravenous, Q6H PRN, Dav Fowler DO, 10 mg at 12/09/20 0654  •  lisinopril  "(PRINIVIL,ZESTRIL) tablet 20 mg, 20 mg, Oral, Q12H, Dav Fowler DO, 20 mg at 12/10/20 0812  •  melatonin tablet 6 mg, 6 mg, Oral, Nightly, Dav Fowler DO, 6 mg at 20  •  metoprolol tartrate (LOPRESSOR) tablet 25 mg, 25 mg, Oral, Q12H, Dav Fowler DO, 25 mg at 12/10/20 0812  •  ondansetron (ZOFRAN) tablet 4 mg, 4 mg, Oral, Q6H PRN **OR** ondansetron (ZOFRAN) injection 4 mg, 4 mg, Intravenous, Q6H PRN, Dav Fowler DO, 4 mg at 12/10/20 1026  •  Pharmacy to Dose enoxaparin (LOVENOX), , Does not apply, Continuous PRN, Johnnie Zacarias MD  •  [COMPLETED] Insert peripheral IV, , , Once **AND** sodium chloride 0.9 % flush 10 mL, 10 mL, Intravenous, PRN, Dav Fowler DO  •  sodium chloride 0.9 % flush 10 mL, 10 mL, Intravenous, Q12H, Dav Fowler DO, 10 mL at 20  •  sodium chloride 0.9 % flush 10 mL, 10 mL, Intravenous, PRN, Dav Fowler DO  •  sodium chloride 0.9 % infusion, 50 mL/hr, Intravenous, Continuous, Elsy Red APRN, Last Rate: 50 mL/hr at 12/10/20 1253, 50 mL/hr at 12/10/20 1253  •  technetium sestamibi (CARDIOLITE) injection 1 dose, 1 dose, Intravenous, Once in imaging, Dav Fowler DO  •  vitamin C (ASCORBIC ACID) tablet 500 mg, 500 mg, Oral, Daily, Dav Fowler DO, 500 mg at 12/10/20 08  •  zinc sulfate (ZINCATE) capsule 220 mg, 220 mg, Oral, Daily, Dav Fowler DO, 220 mg at 12/10/20 0812      Review of Systems   Unable to perform ROS: Acuity of condition       Objective     Vital Signs:  Temp (24hrs), Av °F (36.7 °C), Min:97.6 °F (36.4 °C), Max:98.2 °F (36.8 °C)      /88   Pulse 105   Temp 98.2 °F (36.8 °C)   Resp 28   Ht 170.2 cm (67\")   Wt 107 kg (235 lb 8 oz)   SpO2 90%   BMI 36.88 kg/m²         Physical Exam     General: The patient is sitting up in chair with BiPAP in place.  I did not call her in the room as her O2 sats were so marginal.  HEENT: BiPAP.  Respiratory: Effort fairly even but " labored.  O2 sats actually dipped down to 82 to 84% while I was in the MICU.   Neuro: Alert, sitting up in recliner.    Psych: Pleasant and cooperative with staff    Results Review:    I reviewed the patient's new clinical results.    Lab Results:  CBC:   Lab Results   Lab 12/04/20  0553 12/07/20  0312   WBC 7.29 10.38   HEMOGLOBIN 13.1 12.1   HEMATOCRIT 40.9 37.8   PLATELETS 306 212         CMP:   Lab Results   Lab 12/08/20  0620 12/09/20  0303 12/10/20  0259   SODIUM 139 139 140   POTASSIUM 4.0 4.4 4.6   CHLORIDE 103 101 102   CO2 25.0 27.0 28.0   BUN 31* 28* 35*   CREATININE 0.50* 0.53* 0.51*   CALCIUM 9.3 9.1 9.4   BILIRUBIN 0.5 0.6 0.5   ALK PHOS 112 118* 96   ALT (SGPT) 12 14 12   AST (SGOT) 21 19 19   GLUCOSE 116* 104* 118*         Culture Results:        Microbiology Results Abnormal     Procedure Component Value - Date/Time    Blood Culture - Blood, Arm, Left [[id number]] Collected: 12/02/20 1615    Lab Status: Final result Specimen: Blood from Arm, Left Updated: 12/07/20 1700     Blood Culture No growth at 5 days    Blood Culture - Blood, Arm, Left [[id number]] Collected: 12/02/20 1618    Lab Status: Final result Specimen: Blood from Arm, Left Updated: 12/07/20 1700     Blood Culture No growth at 5 days    MRSA Screen, PCR (Inpatient) - Swab, Nares [[id number]]  (Abnormal) Collected: 12/06/20 2158    Lab Status: Final result Specimen: Swab from Nares Updated: 12/06/20 2335     MRSA PCR MRSA Detected    Urine Culture - Urine, Urine, Clean Catch [[id number]]  (Abnormal)  (Susceptibility) Collected: 12/03/20 1447    Lab Status: Final result Specimen: Urine, Clean Catch Updated: 12/06/20 0934     Urine Culture >100,000 CFU/mL Klebsiella pneumoniae ssp pneumoniae    Susceptibility      Klebsiella pneumoniae ssp pneumoniae     ARACELI     Ampicillin Resistant     Ampicillin + Sulbactam Susceptible     Cefazolin Susceptible     Cefepime Susceptible     Ceftazidime Susceptible     Ceftriaxone Susceptible     Gentamicin  Susceptible     Levofloxacin Susceptible     Nitrofurantoin Intermediate     Piperacillin + Tazobactam Susceptible     Tetracycline Susceptible     Trimethoprim + Sulfamethoxazole Susceptible                    Legionella Antigen, Urine - Urine, Urine, Clean Catch [[id number]]  (Normal) Collected: 12/03/20 1447    Lab Status: Final result Specimen: Urine, Clean Catch Updated: 12/03/20 1535     LEGIONELLA ANTIGEN, URINE Negative    S. Pneumo Ag Urine or CSF - Urine, Urine, Clean Catch [[id number]]  (Normal) Collected: 12/03/20 1447    Lab Status: Final result Specimen: Urine, Clean Catch Updated: 12/03/20 1534     Strep Pneumo Ag Negative    Respiratory Panel, PCR (WITHOUT COVID) - Swab, Nasopharynx [[id number]]  (Normal) Collected: 12/03/20 1234    Lab Status: Final result Specimen: Swab from Nasopharynx Updated: 12/03/20 1351     ADENOVIRUS, PCR Not Detected     Coronavirus 229E Not Detected     Coronavirus HKU1 Not Detected     Coronavirus NL63 Not Detected     Coronavirus OC43 Not Detected     Human Metapneumovirus Not Detected     Human Rhinovirus/Enterovirus Not Detected     Influenza B PCR Not Detected     Parainfluenza Virus 1 Not Detected     Parainfluenza Virus 2 Not Detected     Parainfluenza Virus 3 Not Detected     Parainfluenza Virus 4 Not Detected     Bordetella pertussis pcr Not Detected     Influenza A H1 2009 PCR Not Detected     Chlamydophila pneumoniae PCR Not Detected     Mycoplasma pneumo by PCR Not Detected     Influenza A PCR Not Detected     Influenza A H3 Not Detected     Influenza A H1 Not Detected     RSV, PCR Not Detected     Bordetella parapertussis PCR Not Detected    Narrative:      The coronavirus on the RVP is NOT COVID-19 and is NOT indicative of infection with COVID-19.     COVID PRE-OP / PRE-PROCEDURE SCREENING ORDER (NO ISOLATION) - Swab, Nasopharynx [[id number]]  (Abnormal) Collected: 12/02/20 1728    Lab Status: Final result Specimen: Swab from Nasopharynx Updated: 12/02/20 1829     Narrative:      The following orders were created for panel order COVID PRE-OP / PRE-PROCEDURE SCREENING ORDER (NO ISOLATION) - Swab, Nasopharynx.  Procedure                               Abnormality         Status                     ---------                               -----------         ------                     COVID-19, [facility]OTT IN-HOUS...[[id number]]  Abnormal            Final result                 Please view results for these tests on the individual orders.    COVID-19, [facility]BB[facility] IN-HOUSE,NP Swab (NO TRANSPORT MEDIA) 2 HR TAT - Swab, Nasopharynx [[id number]]  (Abnormal) Collected: 12/02/20 1728    Lab Status: Final result Specimen: Swab from Nasopharynx Updated: 12/02/20 1825     COVID19 Detected    Narrative:      Fact sheet for providers: https://www.fda.gov/media/090465/download     Fact sheet for patients: https://www.fda.gov/media/939936/download        Results for RASHAD HAM (MRN [id number]) as of 12/10/2020 15:34   Ref. Range 12/2/2020 16:18 12/3/2020 04:48 12/9/2020 03:03   C-Reactive Protein Latest Ref Range: 0.00 - 0.50 mg/dL 11.27 (H) 8.96 (H) 18.57 (H)     Results for RASHAD HAM (MRN [id number]) as of 12/10/2020 15:34   Ref. Range 12/2/2020 16:18 12/3/2020 04:48 12/9/2020 03:03   Ferritin Latest Ref Range: 13.00 - 150.00 ng/mL 1,034.00 (H) 738.90 (H) 689.60 (H)         Radiology:   Imaging Results (Last 72 Hours)     Procedure Component Value Units Date/Time    XR Chest 1 View [[id number]] Collected: 12/10/20 1130     Updated: 12/10/20 1134    Narrative:      EXAMINATION: XR CHEST 1 VW-  12/10/2020 11:30 AM CST 1 view     HISTORY: sob, covid 19, respiratory failure; U07.1-COVID-19     COMPARISON: 12/07/2020.     FINDINGS:   Bilateral airspace consolidations are again noted and similar to the  previous examination with the exception of some mild improved aeration  in the LEFT upper lung. Cardiomegaly is unchanged.      The osseous structures and surrounding soft tissues demonstrate  no acute  abnormality.          Impression:         1.  Persistent bilateral airspace consolidations and cardiomegaly.  Slight interval improvement in aeration in the LEFT upper lung.        This report was finalized on 12/10/2020 11:31 by Dr. Riki Hartmann MD.    XR Chest 1 View [[id number]] Collected: 12/07/20 2043     Updated: 12/07/20 2046    Narrative:      EXAMINATION:  XR CHEST 1 VW-  12/7/2020 8:28 PM CST     HISTORY: decreased O2 saturation; SOA; COVID +; U07.1-COVID-19     COMPARISON: 12/2/2020.     FINDINGS:  Bilateral infiltrates are not significantly changed. There is  continued cardiomegaly. No significant pleural effusion is seen.       Impression:      1. No significant change in bilateral infiltrates.  2. Cardiomegaly.        This report was finalized on 12/07/2020 20:43 by Dr. Isiah Benietz MD.          Assessment/Plan     Active Hospital Problems    Diagnosis   • **Pneumonia due to COVID-19 virus   • Acute respiratory distress syndrome (ARDS) due to COVID-19 virus (CMS/HCC)   • RITA (acute kidney injury) (CMS/HCC)   • COVID-19   • Acute respiratory failure with hypoxia (CMS/HCC)   • Benign essential hypertension   • Common variable immunodeficiency (CMS/HCC)       IMPRESSION:  COVID-19 infection with pneumonia and acute respiratory failure-status post convalescent plasma.  Completed remdesivir.  Acute kidney injury-resolved  Elevated CRP  Elevated ferritin  Elevated D-dimer  Pyuria with few squamous epithelial cells-urine culture with Klebsiella pneumoniae treated with 7 days ceftriaxone  History of common variable immunodeficiency but no treatment in the last 20 years  MRSA nasal screen positive      RECOMMENDATION:   · Continue anticoagulation per hospitalist  · Continue adjuvant therapy per hospitalist  · Up to chair when able  · Continue recommending proning  · If any change in condition, fever, concern for bacterial process, start broad coverage for healthcare associated process including   vancomycin given MRSA nasal screen positive        Tressa Trevino MD  12/10/20  14:13 CST

## 2020-12-10 NOTE — PROGRESS NOTES
"Pharmacy Dosing Service  Anticoagulant  Enoxaparin    Assessment/Action/Plan:  Pharmacy to dose Enoxaparin for the indication of full anticoagulation; covid 19; worsening o2 requirement, hypercoagulable state.  Additional 60mg sq once this am, followed by Enoxaparin 110mg (1mg/kg) sq every 12h with the next dose scheduled at 2100.  Pharmacy will continue to monitor renal function and adjust dose accordingly.      Subjective:  Radha Smiley is a 55 y.o. female on Enoxaparin 110 mg (1mg/kg) SQ every 12 hours for indication of  covid 19; worsening o2 requirement, hypercoagulable state .    Objective:  [Ht: 170.2 cm (67\"); Wt: 107 kg (235 lb 8 oz); BMI: Body mass index is 36.88 kg/m².]  Estimated Creatinine Clearance: 155.2 mL/min (A) (by C-G formula based on SCr of 0.51 mg/dL (L)).   Lab Results   Component Value Date    INR 1.06 12/02/2020    PROTIME 13.4 12/02/2020      Lab Results   Component Value Date    HGB 12.1 12/07/2020    HGB 13.1 12/04/2020    HGB 11.7 (L) 12/03/2020      Lab Results   Component Value Date     12/07/2020     12/04/2020     12/03/2020     Lab Results   Component Value Date    DDIMER 19.30 (H) 12/08/2020         Balwinder Weinstein, PharmD  12/10/20 10:16 CST     "

## 2020-12-10 NOTE — PROGRESS NOTES
Continued Stay Note   [place]     Patient Name: Radha Smiley  MRN: [id number]  Today's Date: 12/10/2020    Admit Date: 12/2/2020    Discharge Plan     Row Name 12/10/20 1059       Plan    Plan  unclear    Plan Comments  Patient remains in ICU, now on bipap.  Needs unclear at this time, will follow.        Discharge Codes    No documentation.             ROGER Dhillon

## 2020-12-11 PROBLEM — R57.9 SHOCK, UNSPECIFIED (HCC): Status: ACTIVE | Noted: 2020-01-01

## 2020-12-11 PROBLEM — Z78.9 PROBLEM WITH VASCULAR ACCESS: Status: ACTIVE | Noted: 2020-01-01

## 2020-12-11 PROBLEM — D72.829 LEUKOCYTOSIS: Status: ACTIVE | Noted: 2020-01-01

## 2020-12-11 PROBLEM — J95.811 POSTPROCEDURAL PNEUMOTHORAX: Status: ACTIVE | Noted: 2020-01-01

## 2020-12-11 NOTE — CONSULTS
Chief Complaint   Patient presents with   • Shortness of Breath         Subjective     History of Present Illness  55-year-old female with history of common variable immunodeficiency, morbid obesity, history of smoking, and hypertension presented to [facility] with a known diagnosis of COVID-19.  She had been diagnosed prior to Thanksgiving.  She was managed as an outpatient but unfortunately continued to progress and required admission.  She has been admitted with acute respiratory hypoxic failure.  She was supported with BiPAP but unfortunate require mechanical ventilation.  Central venous line placement was attempted in the left subclavian position with a pneumothorax noted thereafter.  Chest tube placement was performed initially by Dr. Woodard but unfortunately was unable to satisfactorily treat the pneumothorax.  I was asked for emergency consultation.  No family is present.  The patient was hypotensive with multiple pressors in place, profoundly tachycardic and hypoxic.    Review of Systems   Unable to perform ROS: Unstable vital signs          Past Medical History:   Diagnosis Date   • Cellulitis    • Hypertension      Past Surgical History:   Procedure Laterality Date   • INTRAOCULAR PROSTHESES INSERTION Left      Family History   Problem Relation Age of Onset   • Diabetes Sister    • Diabetes Brother      Social History     Tobacco Use   • Smoking status: Former Smoker   Substance Use Topics   • Alcohol use: Not Currently   • Drug use: Not on file     Medications Prior to Admission   Medication Sig Dispense Refill Last Dose   • carboxymethylcellulose (REFRESH PLUS) 0.5 % solution Administer 1 drop to the right eye 4 (Four) Times a Day As Needed for Dry Eyes.   12/2/2020 at Unknown time   • Cholecalciferol (Vitamin D) 50 MCG (2000 UT) tablet Take 2,000 Units by mouth Daily.   11/30/2020 at Unknown time   • cloNIDine (CATAPRES) 0.2 MG tablet Take 0.2 mg by mouth 2 (Two) Times a Day. Hold for BP less  than 110/60   12/2/2020 at Unknown time   • dexamethasone (DECADRON) 6 MG tablet Take 6 mg by mouth Daily With Breakfast.   12/2/2020 at Unknown time   • fluticasone (FLONASE) 50 MCG/ACT nasal spray 1 spray into the nostril(s) as directed by provider 2 (two) times a day.   11/30/2020 at Unknown time   • hydroCHLOROthiazide (HYDRODIURIL) 25 MG tablet Take 25 mg by mouth Daily.   12/2/2020 at Unknown time   • lisinopril (PRINIVIL,ZESTRIL) 40 MG tablet Take 20 mg by mouth 2 (two) times a day.   12/2/2020 at Unknown time   • metoprolol tartrate (LOPRESSOR) 50 MG tablet Take 25 mg by mouth 2 (Two) Times a Day.   12/2/2020 at Unknown time     Allergies:  Demerol [meperidine]    Objective      Vital Signs  Temp:  [98 °F (36.7 °C)-100.2 °F (37.9 °C)] (P) 100.2 °F (37.9 °C)  Heart Rate:  [] 113  Resp:  [26-30] 28  BP: ()/() 62/38  Arterial Line BP: (83-92)/(38-41) 83/38  FiO2 (%):  [100 %] 100 %    Physical Exam  Constitutional:       Appearance: She is well-developed. She is obese. She is ill-appearing.      Comments: Sedated, unresponsive   HENT:      Head: Normocephalic and atraumatic.   Neck:      Musculoskeletal: Normal range of motion and neck supple.      Thyroid: No thyromegaly.      Vascular: No JVD.      Trachea: No tracheal deviation.      Comments: The trachea is deviated to the right.  No subcutaneous emphysema  Cardiovascular:      Rate and Rhythm: Regular rhythm. Tachycardia present.      Heart sounds: Normal heart sounds. No murmur. No friction rub. No gallop.       Comments: Hyperdynamic  Pulmonary:      Effort: No respiratory distress.      Breath sounds: No wheezing or rales.      Comments: Absent breath sounds on the left.  Hyperresonant on the right with mechanical coarse breath sounds  Chest:      Chest wall: No tenderness.   Abdominal:      General: There is no distension.      Palpations: Abdomen is soft.      Tenderness: There is no abdominal tenderness.   Musculoskeletal: Normal  range of motion.   Lymphadenopathy:      Cervical: No cervical adenopathy.   Skin:     General: Skin is warm and dry.      Comments: There is a chest tube on the left that is occluded.  No tidalling.   Neurological:      Cranial Nerves: No cranial nerve deficit.      Comments: Sedated and unresponsive.  Not moving extremities         Results Review:   Nm Lung Scan Perfusion Particulate    Result Date: 12/3/2020  Narrative: EXAMINATION:  NM LUNG SCAN PERFUSION PARTICULATE-  12/3/2020 8:16 AM CST  HISTORY: Chest pain, acute, PE suspected, intermed prob, negative D-dimer; U07.1-COVID-19.  COMPARISON: Chest x-ray on 12/2/2020.  TECHNIQUE: The patient was injected with 5.1 mCi of technetium 99m MAA intravenously. Multiple projection imaging was then performed.  FINDINGS: There is a single small peripheral perfusion defect in the right lung base laterally on the posterior image. The patient has an elevated right hemidiaphragm on x-ray. This is likely related.      Impression: Low probability of pulmonary embolus. This report was finalized on 12/03/2020 08:54 by Dr. Isiah Benitez MD.    Xr Chest 1 View    Result Date: 12/11/2020  Narrative: EXAMINATION: XR CHEST 1 VW-  12/11/2020 7:26 AM CST 1 view  HISTORY: pneumo; U07.1-COVID-19; J96.01-Acute respiratory failure with hypoxia; U07.1-COVID-19; J12.89-Other viral pneumonia; U07.1-COVID-19; J80-Acute respiratory distress syndrome  COMPARISON: 12/11/2020.  FINDINGS: A new chest tube is been placed on the LEFT with significant decrease in size of the pneumothorax. The tip projects over the LEFT lung apex. The LEFT lung is improved in aeration. There is still bilateral interstitial and airspace opacities. Other tubes and lines are unchanged.  The osseous structures and surrounding soft tissues demonstrate no acute abnormality.       Impression:  1.  Interval placement of a new LEFT chest tube with significant improvement in the LEFT lung. The pneumothorax is not definitively  seen on this examination and there is improved aeration of the LEFT lung.  2.  Persistent bilateral consolidative changes.   This report was finalized on 12/11/2020 07:28 by Dr. Riki Hartmann MD.    Xr Chest 1 View    Result Date: 12/11/2020  Narrative: EXAMINATION: XR CHEST 1 VW-  12/11/2020 7:25 AM CST 1 view  HISTORY: decrease in pt status; U07.1-COVID-19; J96.01-Acute respiratory failure with hypoxia; U07.1-COVID-19; J12.89-Other viral pneumonia; U07.1-COVID-19; J80-Acute respiratory distress syndrome  COMPARISON: 12/11/2020.  FINDINGS: Slight interval increase in size of the pneumothorax on the LEFT, now approximately 30%. No change in the appearance of the lungs.  The LEFT basilar chest tube appears to been repositioned. Other tubes and lines are unchanged.       Impression:  1.  Slight interval increase in size of the pneumothorax on the LEFT, now approximately 30%. It appears that the chest tube at the LEFT lung base has been repositioned.  2.  No change in the appearance of the lung parenchyma.   This report was finalized on 12/11/2020 07:26 by Dr. Riki Hartmann MD.    Xr Chest 1 View    Result Date: 12/11/2020  Narrative: EXAMINATION: XR CHEST 1 VW-  12/11/2020 7:23 AM CST 1 view  HISTORY: evaluate chest tube placement; U07.1-COVID-19; J96.01-Acute respiratory failure with hypoxia; U07.1-COVID-19; J12.89-Other viral pneumonia; U07.1-COVID-19; J80-Acute respiratory distress syndrome  COMPARISON: 12/11/2020.  FINDINGS: No change in the size of the pneumothorax on the LEFT. Chest tube remains in place. Endotracheal tube, enteric tube, and subclavian central venous catheter are all redemonstrated. There is slight worsening of interstitial and airspace opacity in both lungs.  The osseous structures and surrounding soft tissues demonstrate no acute abnormality.       Impression:  1.  Unchanged LEFT pneumothorax. No change in the position of the chest tube. The chest tube projects over the LEFT lower chest.   2.  Slight worsening of interstitial and airspace opacities bilaterally.   This report was finalized on 12/11/2020 07:25 by Dr. Riki Hartmann MD.    Xr Chest 1 View    Result Date: 12/11/2020  Narrative: EXAMINATION: XR CHEST 1 VW-  12/11/2020 7:21 AM CST 1 view  HISTORY: chest tube; U07.1-COVID-19; J96.01-Acute respiratory failure with hypoxia; U07.1-COVID-19; J12.89-Other viral pneumonia; U07.1-COVID-19; J80-Acute respiratory distress syndrome  COMPARISON: 12/11/2020 and 12/10/2020.  FINDINGS: The pneumothorax on the LEFT has decreased in size, now approximately 20%. There is improved aeration of the LEFT lung with persistent consolidative change. There is slightly improved aeration in the RIGHT lung with persistent interstitial and airspace opacities. No change in the cardiomediastinal silhouette. The LEFT subclavian central venous catheter is kinked. The endotracheal tube is unchanged in position. Enteric tube courses off the inferior margin of this study.  The osseous structures and surrounding soft tissues demonstrate no acute abnormality.       Impression:  1.  Interval decrease in size of the LEFT pneumothorax, now approximately 20%. Slight improvement in aeration of the LEFT lung. No change in appearance of the RIGHT lung.   This report was finalized on 12/11/2020 07:23 by Dr. Riki Hartmann MD.    Xr Chest 1 View    Result Date: 12/11/2020  Narrative: EXAMINATION: XR CHEST 1 VW-  12/11/2020 7:14 AM CST 1 view  HISTORY: COVID; U07.1-COVID-19; J96.01-Acute respiratory failure with hypoxia; U07.1-COVID-19; J12.89-Other viral pneumonia; U07.1-COVID-19; J80-Acute respiratory distress syndrome  COMPARISON: 12/10/2020 and 12/10/2020.  FINDINGS: There has been return of the LEFT pneumothorax which is large, accounting for approximately 50% of the LEFT chest. This is similar to the study from yesterday prior to the chest tube placement. Interstitial and air space consolidations are noted in the RIGHT lung as  well.  Endotracheal tube tip projects approximately 3 cm above the kristine. LEFT subclavian central venous catheter is kinked but the tip projects over the mid SVC. There is a chest tube at the LEFT lung base.       Impression:  1.  LEFT pneumothorax has returned accounting for approximately 50% of the LEFT hemithorax..  2.  Interstitial and airspace consolidations in the RIGHT lung are slightly worse than on the comparison exam.   This report was finalized on 12/11/2020 07:20 by Dr. Riki Hartmann MD.    Xr Chest 1 View    Result Date: 12/10/2020  Narrative: EXAMINATION:  XR CHEST 1 VW-  12/10/2020 9:25 PM CST  HISTORY: Left chest tube placement. Pneumothorax. Covid 19.  COMPARISON: 12/10/2020 at 8:24 PM.  FINDINGS:  There is a new left chest tube in place. There is a small amount of residual left-sided pneumothorax. There is no longer shifting of the heart and mediastinum towards the right side. Bilateral lung infiltrates are not significantly changed. There is cardiomegaly. There have been no tube or line changes.      Impression: 1. Left chest tube placement. There is a small amount of residual pneumothorax on the left. 2. Bilateral lung infiltrates may represent edema or pneumonia. 3. Cardiomegaly.   This report was finalized on 12/10/2020 21:35 by Dr. Isiah Benitez MD.    Xr Chest 1 View    Result Date: 12/10/2020  Narrative: EXAMINATION:  XR CHEST 1 VW-  12/10/2020 9:25 PM CST  HISTORY: Central line placement. Covid 19.  COMPARISON: 12/10/2020.  FINDINGS:  There is a left subclavian central venous catheter with catheter tip near the innominate vein and superior vena cava junction. There is a tension pneumothorax on the left. There is mild shifting of the heart to the right. There is mild flattening of the left hemidiaphragm. The next chest x-ray on the patient demonstrates placement of a chest tube. There is diffuse lung infiltrate on the right. There is mild cardiomegaly. Endotracheal and NG tubes remain in  place.      Impression: 1. Left subclavian central venous catheter placement. 2. Tension pneumothorax on the left side. The subsequent chest x-ray demonstrated placement of a chest tube. 3. Diffuse infiltrate on the right may represent pulmonary edema or infection/inflammation. 4. Cardiomegaly.   This report was finalized on 12/10/2020 21:34 by Dr. Isiah Benitez MD.    Xr Chest 1 View    Result Date: 12/10/2020  Narrative: EXAMINATION:  XR CHEST 1 VW-  12/10/2020 5:56 PM CST  HISTORY: ETT placement; U07.1-COVID-19; J96.01-Acute respiratory failure with hypoxia  COMPARISON: 12/10/2020.  FINDINGS:  The patient is now intubated. The endotracheal tube tip is 2.8 cm above the kristine. There may be some overdistention of the cuff on the endotracheal tube as there is slightly outward bowing of the trachea measuring 2.6 cm. Bilateral infiltrates are either unchanged or minimally worse. Heart size is upper limits of normal. There is poor inspiration.      Impression: 1. Patient intubated with endotracheal tube tip 2.8 cm above the kristine. There may be over distention of the cuff on the endotracheal tube causing outward bowing of the trachea and measuring 2.6 cm. 2. Bilateral infiltrates either unchanged or minimally worse.   This report was finalized on 12/10/2020 18:30 by Dr. Isiah Bentiez MD.    Xr Chest 1 View    Result Date: 12/10/2020  Narrative: EXAMINATION: XR CHEST 1 -  12/10/2020 11:30 AM CST 1 view  HISTORY: sob, covid 19, respiratory failure; U07.1-COVID-19  COMPARISON: 12/07/2020.  FINDINGS: Bilateral airspace consolidations are again noted and similar to the previous examination with the exception of some mild improved aeration in the LEFT upper lung. Cardiomegaly is unchanged.  The osseous structures and surrounding soft tissues demonstrate no acute abnormality.       Impression:  1.  Persistent bilateral airspace consolidations and cardiomegaly. Slight interval improvement in aeration in the LEFT upper  lung.   This report was finalized on 12/10/2020 11:31 by Dr. Riki Hartmann MD.    Xr Chest 1 View    Result Date: 12/7/2020  Narrative: EXAMINATION:  XR CHEST 1 VW-  12/7/2020 8:28 PM CST  HISTORY: decreased O2 saturation; SOA; COVID +; U07.1-COVID-19  COMPARISON: 12/2/2020.  FINDINGS:  Bilateral infiltrates are not significantly changed. There is continued cardiomegaly. No significant pleural effusion is seen.      Impression: 1. No significant change in bilateral infiltrates. 2. Cardiomegaly.   This report was finalized on 12/07/2020 20:43 by Dr. Isiah Benitez MD.    Xr Chest 1 View    Result Date: 12/2/2020  Narrative: EXAMINATION:  XR CHEST 1 VW-  12/2/2020 4:25 PM CST  HISTORY: Shortness of breath.  COMPARISON: 5/30/2012.  FINDINGS:  There is hypoventilation. There are bilateral infiltrates. There is elevation/eventration of the right hemidiaphragm. There is borderline cardiomegaly. No significant pleural effusion is seen.      Impression: 1. Hypoventilation. 2. Bilateral infiltrates may be infectious or inflammatory. Pulmonary edema also possible. 3. Borderline cardiomegaly.   This report was finalized on 12/02/2020 16:37 by Dr. Isiah Benitez MD.     I reviewed the patient's new clinical results.      Assessment/Plan       Pneumonia due to COVID-19 virus    COVID-19    Benign essential hypertension    Common variable immunodeficiency (CMS/HCC)    Acute respiratory failure with hypoxia (CMS/HCC)    RITA (acute kidney injury) (CMS/HCC)    Acute respiratory distress syndrome (ARDS) due to COVID-19 virus (CMS/HCC)    Shock, unspecified (CMS/HCC)    Problem with vascular access    Leukocytosis    Postprocedural pneumothorax, left side, s/p chest tube 12/10  Tension pneumothorax  Obstructive shock with I suspect distributive/septic shock  Morbid obesity      No family was present.  The patient is unresponsive and intubated with a mortal problem.  Emergency chest tube placement was recommended on the left after  clearing the clot from the left chest tube.  An air leak was noted but hemodynamics did not improve.  Repeat chest x-ray was performed with no significant change and with a chest tube in a basilar position.  A left chest tube was placed please see the separately detailed operative note.  Repeat chest x-ray showed effectively resolution of pneumothorax on the left, marked improvement in hemodynamics and now continued hypoxia with saturations at least compatible with life.  Her prognosis remains guarded.  Discussed with Shweta plans for current chest tube placed by me to remain on suction 20 cmH2O and the previously placed chest tube to remain on 40 cmH2O.        Matthias Aguillon MD  12/11/20  11:17 CST

## 2020-12-11 NOTE — PROCEDURES
"Intubation    Date/Time: 12/10/2020 6:00 PM  Performed by: Johnnie Zacarias MD  Authorized by: Johnnie Zacarias MD   Consent: Verbal consent obtained.  Risks and benefits: risks, benefits and alternatives were discussed  Consent given by: patient  Patient understanding: patient states understanding of the procedure being performed  Patient consent: the patient's understanding of the procedure matches consent given  Relevant documents: relevant documents present and verified  Imaging studies: imaging studies available  Patient identity confirmed: verbally with patient  Time out: Immediately prior to procedure a \"time out\" was called to verify the correct patient, procedure, equipment, support staff and site/side marked as required.  Indications: respiratory distress,  respiratory failure and  hypoxemia  Intubation method: video-assisted  Patient status: awake  Preoxygenation: none  Sedatives: etomidate  Paralytic: succinylcholine  Laryngoscope size: Mac 3  Tube size: 7.5 mm  Tube type: cuffed  Number of attempts: 1  Cricoid pressure: no  Cords visualized: yes  Post-procedure assessment: chest rise  Breath sounds: equal  Cuff inflated: yes  ETT to lip: 22 cm  Tube secured with: ETT rdz  Chest x-ray interpreted by me.  Chest x-ray findings: endotracheal tube in appropriate position  Patient tolerance: patient tolerated the procedure well with no immediate complications        "

## 2020-12-11 NOTE — NURSING NOTE
Pulmonology contacted due to pt not tolerating the vent settings. Dr. Holt called back and ordered us to turn her to AC 20, peep 8,  and maintain FiO2 at 100%. He also ordered to DC precedex, start fentanyl and propofol, and versed and if the pt was still not tolerating it, we could start a rocuronium drip.

## 2020-12-11 NOTE — PROGRESS NOTES
Continued Stay Note   [facility]     Patient Name: Radha Smiley  MRN: [id number]  Today's Date: 12/11/2020    Admit Date: 12/2/2020    Discharge Plan     Row Name 12/11/20 0946       Plan    Plan  unclear/eventual LTAC?    Plan Comments  Patient is now on vent, has chest tubes.  When stable, patient may benefit from LTAC placement.  Will follow.        Discharge Codes    No documentation.             ROGER Dhillon

## 2020-12-11 NOTE — PROCEDURES
"Insert Central Line At Bedside    Date/Time: 12/10/2020 8:30 PM  Performed by: Patrick Schulte MD  Authorized by: Patrick Schulte MD   Consent: The procedure was performed in an emergent situation.  Required items: required blood products, implants, devices, and special equipment available  Patient identity confirmed: arm band, provided demographic data and hospital-assigned identification number  Time out: Immediately prior to procedure a \"time out\" was called to verify the correct patient, procedure, equipment, support staff and site/side marked as required.  Indications: vascular access and central pressure monitoring  Anesthesia: local infiltration    Anesthesia:  Local Anesthetic: lidocaine 1% without epinephrine  Anesthetic total: 5 mL    Sedation:  Patient sedated: no    Skin prep agent dried: skin prep agent completely dried prior to procedure  Sterile barriers: all five maximum sterile barriers used - cap, mask, sterile gown, sterile gloves, and large sterile sheet  Hand hygiene: hand hygiene performed prior to central venous catheter insertion  Location details: left subclavian  Site selection rationale: site favorable, duration of need, patient in severe shock  Patient position: flat  Catheter type: triple lumen  Catheter size: 7 Fr  Pre-procedure: landmarks identified  Ultrasound guidance: no  Number of attempts: 1  Successful placement: yes  Post-procedure: line sutured and dressing applied  Assessment: blood return through all ports,  free fluid flow and placement verified by x-ray  Complications: pneumothorax        "

## 2020-12-11 NOTE — PROCEDURES
"    [facility] Medicine CODE Note       Date of Encounter: 12/11/20    Attending Physician: Dr. Zacarias.     Description of Events:   This is a patient of my partner, Dr. Zacarias, who has been deteriorating throughout the course of the day. I was on the unit when she started to lose her blood pressure and have progressive bradycardia.  I entered the room with her nurses, Ricardo and Ana.  She ultimately went asystole and had a flat line on her arterial line.  The patient's daughter and  had requested that we try resuscitative efforts.  We started chest compressions and gave her epinephrine.  On the first pulse check, she continued to be asystole without a pulse.  We resumed compressions and gave another round of epinephrine.  Chaplaincy and other nurses were able to get her family on the phone.  They had been able to come to the bedside to see her condition prior to her ultimate deterioration.  They felt that resuscitative efforts were futile and I agreed.  We terminated resuscitative efforts and she ultimately passed away at 1626.      Vital Signs: BP (!) 62/38   Pulse (!) 124   Temp (P) 100.2 °F (37.9 °C)   Resp 28   Ht 170.2 cm (67\")   Wt 107 kg (235 lb 8 oz)   SpO2 92%   BMI 36.88 kg/m²   Physical Exam  The patient appeared very acutely ill.  She has an artificial left eye.  Her right eye displayed subconjunctival hemorrhage.  Her pupil was dilated.  She had a left subclavian CVC that is not functional.  She had 2 left-sided chest tubes.  She had bleeding noted from the chest tube sites.  This has been an ongoing issue today.  She had a right femoral CVC and arterial line that I placed cleanly earlier today.  She was on 3 different pressors to maintain her blood pressure.  She ultimately arrested.  At the end of the resuscitative efforts, no cardiac tones or pulmonary effort was able to be assessed.    Outcome: Patient passed at 1626. Her daughter wanted us " to terminate resuscitative efforts. Her  had been at the door to see her earlier this afternoon given her condition.     Electronically signed by Mehul Russo DO, 12/11/2020, 16:34 CST.     yes

## 2020-12-11 NOTE — PAYOR COMM NOTE
"AUTH: [id number]    Rashad Smiley (55 y.o. Female)     Date of Birth Social Security Number Address Home Phone MRN    1965  [id number] [address] [phone] [id number]    Holiness Marital Status          Other        Admission Date Admission Type Admitting Provider Attending Provider Department, Room/Bed    12/2/20 Emergency Johnnie Zacarias MD [place], Glenn Riego, MD [facility] INTENSIVE CARE, I016/1    Discharge Date Discharge Disposition Discharge Destination                       Attending Provider: Johnnie Zacarias MD    Allergies: Demerol [Meperidine]    Isolation: Enh Drop/Con   Infection: COVID (confirmed) (12/02/20), MRSA (12/06/20)   Code Status: CPR    Ht: 170.2 cm (67\")   Wt: 107 kg (235 lb 8 oz)    Admission Cmt: None   Principal Problem: Pneumonia due to COVID-19 virus [U07.1,J12.89]                 Active Insurance as of 12/2/2020     Primary Coverage     Payor Plan Insurance Group Employer/Plan Group    [employer] BLUE [employer]OSS [employer] BLUE [employer]OSS BLUE [employer] PPO [id number]     Payor Plan Address Payor Plan Phone Number Payor Plan Fax Number Effective Dates    [address] [phone]  11/1/2020 - None Entered    [place] [address]       Subscriber Name Subscriber Birth Date Member ID       RASHAD SMILEY 1965 [id number]                 Emergency Contacts      (Rel.) Home Phone Work Phone Mobile Phone    LETTY HARDY (Spouse) -- -- [phone]    FLOR HER (Daughter) -- -- [phone]               Physician Progress Notes (last 24 hours) (Notes from 12/10/20 1014 through 12/11/20 1014)      Johnnie Zacarias MD at 12/11/20 0815          1           [facility] Medicine Services  INPATIENT PROGRESS NOTE    Patient Name: Rashad Smiley  Date of Admission: 12/2/2020  Today's Date: 12/11/20  Length of Stay: 9  Primary Care Physician: Adrien De Leon " MD    Subjective   Chief Complaint: Follow-up  HPI   Patient was in respiratory distress and showed signs of fatigue yesterday evening.  I came to see her and intubated her.  Patient needed a central line.  This was complicated by pneumothorax requiring 2 chest tubes.  The interested reader is referred to nurses note for details.  CT surgery consulted.  Pulmonary has been consulted as well early yesterday morning  Patient is hypotensive.  She is on 2 pressors  Left subclavian not working.  Alteplase recommended by Dr. Nehemias Samaniego.  Due to the noted kink on left subclavian central line, I pulled about 1 cm out. We were able to flush on one port and draw blood in the other    Review of Systems   Patient unable to participate in her care.  She is critically ill  Objective    Temp:  [98 °F (36.7 °C)-99.7 °F (37.6 °C)] 99.7 °F (37.6 °C)  Heart Rate:  [] 121  Resp:  [26-30] 26  BP: ()/() 110/84  FiO2 (%):  [100 %] 100 %  Physical Exam  Vitals signs and nursing note reviewed.   Constitutional:       Appearance: She is toxic-appearing.      Comments: Intubated, sedated, mechanically ventilated   HENT:      Head: Normocephalic.   Eyes:      General: No scleral icterus.  Cardiovascular:      Rate and Rhythm: Tachycardia present.   Pulmonary:      Comments: Mechanically ventilated  Abdominal:      Comments: Obese, no gross organomegaly   Skin:     General: Skin is warm and dry.      Coloration: Skin is not jaundiced.      Findings: No bruising.   Psychiatric:      Comments: Flat affect       Blood pressure in the 60s    Results Review:  I have reviewed the labs, radiology results, and diagnostic studies.    Laboratory Data:   Results from last 7 days   Lab Units 12/11/20  0508 12/07/20  0312   WBC 10*3/mm3 31.40* 10.38   HEMOGLOBIN g/dL 11.8* 12.1   HEMATOCRIT % 36.9 37.8   PLATELETS 10*3/mm3 419 212        Results from last 7 days   Lab Units 12/11/20  0508 12/10/20  0259 12/09/20  0303   SODIUM mmol/L 140 140  139   POTASSIUM mmol/L 5.4* 4.6 4.4   CHLORIDE mmol/L 104 102 101   CO2 mmol/L 25.0 28.0 27.0   BUN mg/dL 47* 35* 28*   CREATININE mg/dL 1.15* 0.51* 0.53*   CALCIUM mg/dL 9.2 9.4 9.1   BILIRUBIN mg/dL 0.5 0.5 0.6   ALK PHOS U/L 88 96 118*   ALT (SGPT) U/L 12 12 14   AST (SGOT) U/L 21 19 19   GLUCOSE mg/dL 152* 118* 104*       Culture Data:   No results found for: BLOODCX, URINECX, WOUNDCX, MRSACX, RESPCX, STOOLCX    Radiology Data:   Imaging Results (Last 24 Hours)     Procedure Component Value Units Date/Time    XR Chest 1 View [[id number]] Collected: 12/11/20 0726     Updated: 12/11/20 0731    Narrative:      EXAMINATION: XR CHEST 1 VW-  12/11/2020 7:26 AM CST 1 view     HISTORY: pneumo; U07.1-COVID-19; J96.01-Acute respiratory failure with  hypoxia; U07.1-COVID-19; J12.89-Other viral pneumonia; U07.1-COVID-19;  J80-Acute respiratory distress syndrome     COMPARISON: 12/11/2020.     FINDINGS:   A new chest tube is been placed on the LEFT with significant decrease in  size of the pneumothorax. The tip projects over the LEFT lung apex. The  LEFT lung is improved in aeration. There is still bilateral interstitial  and airspace opacities. Other tubes and lines are unchanged.      The osseous structures and surrounding soft tissues demonstrate no acute  abnormality.          Impression:         1.  Interval placement of a new LEFT chest tube with significant  improvement in the LEFT lung. The pneumothorax is not definitively seen  on this examination and there is improved aeration of the LEFT lung.     2.  Persistent bilateral consolidative changes.        This report was finalized on 12/11/2020 07:28 by Dr. Riki Hartmann MD.    XR Chest 1 View [[id number]] Collected: 12/11/20 0725     Updated: 12/11/20 0729    Narrative:      EXAMINATION: XR CHEST 1 VW-  12/11/2020 7:25 AM CST 1 view     HISTORY: decrease in pt status; U07.1-COVID-19; J96.01-Acute respiratory  failure with hypoxia; U07.1-COVID-19; J12.89-Other viral  pneumonia;  U07.1-COVID-19; J80-Acute respiratory distress syndrome     COMPARISON: 12/11/2020.     FINDINGS:   Slight interval increase in size of the pneumothorax on the LEFT, now  approximately 30%. No change in the appearance of the lungs.      The LEFT basilar chest tube appears to been repositioned. Other tubes  and lines are unchanged.          Impression:         1.  Slight interval increase in size of the pneumothorax on the LEFT,  now approximately 30%. It appears that the chest tube at the LEFT lung  base has been repositioned.     2.  No change in the appearance of the lung parenchyma.        This report was finalized on 12/11/2020 07:26 by Dr. Riki Hartmann MD.    XR Chest 1 View [[id number]] Collected: 12/11/20 0723     Updated: 12/11/20 0728    Narrative:      EXAMINATION: XR CHEST 1 VW-  12/11/2020 7:23 AM CST 1 view     HISTORY: evaluate chest tube placement; U07.1-COVID-19; J96.01-Acute  respiratory failure with hypoxia; U07.1-COVID-19; J12.89-Other viral  pneumonia; U07.1-COVID-19; J80-Acute respiratory distress syndrome     COMPARISON: 12/11/2020.     FINDINGS:   No change in the size of the pneumothorax on the LEFT. Chest tube  remains in place. Endotracheal tube, enteric tube, and subclavian  central venous catheter are all redemonstrated. There is slight  worsening of interstitial and airspace opacity in both lungs.      The osseous structures and surrounding soft tissues demonstrate no acute  abnormality.          Impression:         1.  Unchanged LEFT pneumothorax. No change in the position of the chest  tube. The chest tube projects over the LEFT lower chest.     2.  Slight worsening of interstitial and airspace opacities bilaterally.        This report was finalized on 12/11/2020 07:25 by Dr. Riki Hartmann MD.    XR Chest 1 View [[id number]] Collected: 12/11/20 0721     Updated: 12/11/20 0726    Narrative:      EXAMINATION: XR CHEST 1 VW-  12/11/2020 7:21 AM CST 1 view     HISTORY: chest  tube; U07.1-COVID-19; J96.01-Acute respiratory failure  with hypoxia; U07.1-COVID-19; J12.89-Other viral pneumonia;  U07.1-COVID-19; J80-Acute respiratory distress syndrome     COMPARISON: 12/11/2020 and 12/10/2020.     FINDINGS:   The pneumothorax on the LEFT has decreased in size, now approximately  20%. There is improved aeration of the LEFT lung with persistent  consolidative change. There is slightly improved aeration in the RIGHT  lung with persistent interstitial and airspace opacities. No change in  the cardiomediastinal silhouette. The LEFT subclavian central venous  catheter is kinked. The endotracheal tube is unchanged in position.  Enteric tube courses off the inferior margin of this study.      The osseous structures and surrounding soft tissues demonstrate no acute  abnormality.          Impression:         1.  Interval decrease in size of the LEFT pneumothorax, now  approximately 20%. Slight improvement in aeration of the LEFT lung. No  change in appearance of the RIGHT lung.        This report was finalized on 12/11/2020 07:23 by Dr. Riki Hartmann MD.    XR Chest 1 View [[id number]] Collected: 12/11/20 0714     Updated: 12/11/20 0724    Narrative:      EXAMINATION: XR CHEST 1 VW-  12/11/2020 7:14 AM CST 1 view     HISTORY: COVID; U07.1-COVID-19; J96.01-Acute respiratory failure with  hypoxia; U07.1-COVID-19; J12.89-Other viral pneumonia; U07.1-COVID-19;  J80-Acute respiratory distress syndrome     COMPARISON: 12/10/2020 and 12/10/2020.     FINDINGS:   There has been return of the LEFT pneumothorax which is large,  accounting for approximately 50% of the LEFT chest. This is similar to  the study from yesterday prior to the chest tube placement.   Interstitial and air space consolidations are noted in the RIGHT lung as  well.     Endotracheal tube tip projects approximately 3 cm above the kristine. LEFT  subclavian central venous catheter is kinked but the tip projects over  the mid SVC. There is a  chest tube at the LEFT lung base.          Impression:         1.  LEFT pneumothorax has returned accounting for approximately 50% of  the LEFT hemithorax..     2.  Interstitial and airspace consolidations in the RIGHT lung are  slightly worse than on the comparison exam.        This report was finalized on 12/11/2020 07:20 by Dr. Riki Hartmann MD.    XR Chest 1 View [[id number]] Collected: 12/10/20 2134     Updated: 12/10/20 2138    Narrative:      EXAMINATION:  XR CHEST 1 VW-  12/10/2020 9:25 PM CST     HISTORY: Left chest tube placement. Pneumothorax. Covid 19.     COMPARISON: 12/10/2020 at 8:24 PM.     FINDINGS:  There is a new left chest tube in place. There is a small  amount of residual left-sided pneumothorax. There is no longer shifting  of the heart and mediastinum towards the right side. Bilateral lung  infiltrates are not significantly changed. There is cardiomegaly. There  have been no tube or line changes.       Impression:      1. Left chest tube placement. There is a small amount of residual  pneumothorax on the left.  2. Bilateral lung infiltrates may represent edema or pneumonia.  3. Cardiomegaly.        This report was finalized on 12/10/2020 21:35 by Dr. Isiah Benitez MD.    XR Chest 1 View [[id number]] Collected: 12/10/20 2132     Updated: 12/10/20 2137    Narrative:      EXAMINATION:  XR CHEST 1 VW-  12/10/2020 9:25 PM CST     HISTORY: Central line placement. Covid 19.     COMPARISON: 12/10/2020.     FINDINGS:  There is a left subclavian central venous catheter with  catheter tip near the innominate vein and superior vena cava junction.  There is a tension pneumothorax on the left. There is mild shifting of  the heart to the right. There is mild flattening of the left  hemidiaphragm. The next chest x-ray on the patient demonstrates  placement of a chest tube. There is diffuse lung infiltrate on the  right. There is mild cardiomegaly. Endotracheal and NG tubes remain in  place.        Impression:      1. Left subclavian central venous catheter placement.  2. Tension pneumothorax on the left side. The subsequent chest x-ray  demonstrated placement of a chest tube.  3. Diffuse infiltrate on the right may represent pulmonary edema or  infection/inflammation.  4. Cardiomegaly.        This report was finalized on 12/10/2020 21:34 by Dr. Isiah Benitez MD.    XR Chest 1 View [[id number]] Collected: 12/10/20 1828     Updated: 12/10/20 1833    Narrative:      EXAMINATION:  XR CHEST 1 VW-  12/10/2020 5:56 PM CST     HISTORY: ETT placement; U07.1-COVID-19; J96.01-Acute respiratory failure  with hypoxia     COMPARISON: 12/10/2020.     FINDINGS:  The patient is now intubated. The endotracheal tube tip is  2.8 cm above the kristine. There may be some overdistention of the cuff on  the endotracheal tube as there is slightly outward bowing of the trachea  measuring 2.6 cm. Bilateral infiltrates are either unchanged or  minimally worse. Heart size is upper limits of normal. There is poor  inspiration.       Impression:      1. Patient intubated with endotracheal tube tip 2.8 cm above the kristine.  There may be over distention of the cuff on the endotracheal tube  causing outward bowing of the trachea and measuring 2.6 cm.  2. Bilateral infiltrates either unchanged or minimally worse.        This report was finalized on 12/10/2020 18:30 by Dr. Isiah Benitez MD.    XR Chest 1 View [[id number]] Collected: 12/10/20 1130     Updated: 12/10/20 1134    Narrative:      EXAMINATION: XR CHEST 1 VW-  12/10/2020 11:30 AM CST 1 view     HISTORY: sob, covid 19, respiratory failure; U07.1-COVID-19     COMPARISON: 12/07/2020.     FINDINGS:   Bilateral airspace consolidations are again noted and similar to the  previous examination with the exception of some mild improved aeration  in the LEFT upper lung. Cardiomegaly is unchanged.      The osseous structures and surrounding soft tissues demonstrate no acute  abnormality.           Impression:         1.  Persistent bilateral airspace consolidations and cardiomegaly.  Slight interval improvement in aeration in the LEFT upper lung.        This report was finalized on 12/10/2020 11:31 by Dr. Riki Hartmann MD.          I have reviewed the patient's current medications.     Assessment/Plan     Active Hospital Problems    Diagnosis   • **Pneumonia due to COVID-19 virus   • Shock, unspecified (CMS/HCC)   • Problem with vascular access   • Leukocytosis   • Postprocedural pneumothorax, left side, s/p chest tube 12/10   • Acute respiratory distress syndrome (ARDS) due to COVID-19 virus (CMS/HCC)   • RITA (acute kidney injury) (CMS/[facility])   • COVID-19   • Acute respiratory failure with hypoxia (CMS/[facility])   • Benign essential hypertension   • Common variable immunodeficiency (CMS/[facility])       Mechanical ventilation per pulmonary  Lovenox increased to treatment dose on December 10  Completed remdesivir on December 7  Been receiving diuretic for ARDS  Received ceftriaxone for urinary tract infection involving Klebsiella pneumoniae  Echocardiogram  Interpretation Summary     · Left ventricular ejection fraction appears to be 66 - 70%. Left ventricular systolic function is normal.  · Left ventricular diastolic function was normal.  · No evidence of pulmonary hypertension is present.          CRP 18.6 (8.9)  Ferritin 689 (738.9)   (628)  Procalcitonin 0.09  D-dimer 19.3 (0.52) -she had a VQ scan on December 3 that was read as low probability for pulmonary embolism.  It appeared that she had acute kidney injury on admit where her creatinine was 1.52.  Her creatinine showed some increased trend    Continue supportive care      alteplase (CATHFLO) INTRACATHETER injection, 2 mg, Intracatheter, Once  alteplase (CATHFLO) INTRACATHETER injection, 2 mg, Intracatheter, Once  alteplase (CATHFLO) INTRACATHETER injection, 2 mg, Intracatheter, Once  atorvastatin, 10 mg, Oral, Nightly  cholecalciferol, 1,000 Units,  Oral, Daily  dexamethasone, 10 mg, Intravenous, Daily  enoxaparin, 1 mg/kg, Subcutaneous, Q12H  famotidine, 20 mg, Oral, BID  melatonin, 6 mg, Oral, Nightly  Morphine, 5 mg, Intravenous, Once  sodium chloride, 10 mL, Intravenous, Q12H  technetium sestamibi, 1 dose, Intravenous, Once in imaging  vitamin C, 500 mg, Oral, Daily  zinc sulfate, 220 mg, Oral, Daily      Noted low blood pressure (hypotension) reading which likely triggered the upward trend in creatinine.  Will DC lisinopril and hold off on beta-blocker, diuretic . Cont pressor support    We will check with pharmacist regarding dose adjustment of Lovenox given renal dysfunction    I requested Dr. Russo for central line as subclavian is not working as expected.     Cont vent management per pulmonary     Chest tube management per CT surgery  Worsening leukocytosis - prob stress reaction; defer to ID if any need for abx  Critical care time > 30 mins  Appreciate consultants and others.  Guarded prognosis  Discharge Planning: ?    Electronically signed by Johnnie Zacarias MD, 12/11/20, 09:43 CST.      Electronically signed by Johnnie Zacarias MD at 12/11/20 0943     Tressa Trevino MD at 12/10/20 1400          INFECTIOUS DISEASES PROGRESS NOTE    Patient:  Radha Smiley  YOB: 1965  MRN: [id number]   Admit date: 12/2/2020   Admitting Physician: Johnnie Zacarias*  Primary Care Physician: Adrien De Leon MD    Chief Complaint:  SOA      Interval History: Reviewed with JEAN Silva.  Patient no longer tolerating Vapotherm and has been on BiPAP essentially maxed out.    There have been discussions with her again today about the next step begin ventilator        Allergies:   Allergies   Allergen Reactions   • Demerol [Meperidine] Rash       Current Meds:     Current Facility-Administered Medications:   •  acetaminophen (TYLENOL) tablet 650 mg, 650 mg, Oral, Q4H PRN, 650 mg at 12/09/20 0829 **OR** acetaminophen  (TYLENOL) 160 MG/5ML solution 650 mg, 650 mg, Oral, Q4H PRN **OR** acetaminophen (TYLENOL) suppository 650 mg, 650 mg, Rectal, Q4H PRN, Dav Fowler DO  •  atorvastatin (LIPITOR) tablet 10 mg, 10 mg, Oral, Nightly, Dav Fowler DO, 10 mg at 12/09/20 2108  •  benzonatate (TESSALON) capsule 200 mg, 200 mg, Oral, Q4H PRN, Dav Fowler DO, 200 mg at 12/05/20 2016  •  cholecalciferol (VITAMIN D3) tablet 1,000 Units, 1,000 Units, Oral, Daily, Dav Fowler DO, 1,000 Units at 12/10/20 0812  •  cloNIDine (CATAPRES) tablet 0.1 mg, 0.1 mg, Oral, BID, Leidy Joe DO, 0.1 mg at 12/10/20 0812  •  dexamethasone sodium phosphate injection 10 mg, 10 mg, Intravenous, Daily, Johnnie Zacarias MD, 10 mg at 12/10/20 0812  •  dextromethorphan polistirex ER (DELSYM) 30 MG/5ML oral suspension 60 mg, 10 mL, Oral, Q12H PRN, Dav Fowler DO  •  enoxaparin (LOVENOX) syringe 110 mg, 1 mg/kg, Subcutaneous, Q12H, Johnnie Zacarias MD  •  famotidine (PEPCID) tablet 20 mg, 20 mg, Oral, BID, Dav Fowler DO, 20 mg at 12/10/20 0812  •  labetalol (NORMODYNE,TRANDATE) injection 10 mg, 10 mg, Intravenous, Q6H PRN, Dav Fowler DO, 10 mg at 12/09/20 0654  •  lisinopril (PRINIVIL,ZESTRIL) tablet 20 mg, 20 mg, Oral, Q12H, Dav Fowler DO, 20 mg at 12/10/20 0812  •  melatonin tablet 6 mg, 6 mg, Oral, Nightly, Dav Fowler DO, 6 mg at 12/09/20 2108  •  metoprolol tartrate (LOPRESSOR) tablet 25 mg, 25 mg, Oral, Q12H, Dav Fowler DO, 25 mg at 12/10/20 0812  •  ondansetron (ZOFRAN) tablet 4 mg, 4 mg, Oral, Q6H PRN **OR** ondansetron (ZOFRAN) injection 4 mg, 4 mg, Intravenous, Q6H PRN, Dav Fowler DO, 4 mg at 12/10/20 1026  •  Pharmacy to Dose enoxaparin (LOVENOX), , Does not apply, Continuous PRN, Johnnie Zacarias MD  •  [COMPLETED] Insert peripheral IV, , , Once **AND** sodium chloride 0.9 % flush 10 mL, 10 mL, Intravenous, PRN, Dav Fowler DO  •  sodium chloride  "0.9 % flush 10 mL, 10 mL, Intravenous, Q12H, Dav Fowler DO, 10 mL at 20  •  sodium chloride 0.9 % flush 10 mL, 10 mL, Intravenous, PRN, Dav Fowler DO  •  sodium chloride 0.9 % infusion, 50 mL/hr, Intravenous, Continuous, Elsy Red APRN, Last Rate: 50 mL/hr at 12/10/20 1253, 50 mL/hr at 12/10/20 1253  •  technetium sestamibi (CARDIOLITE) injection 1 dose, 1 dose, Intravenous, Once in imaging, Dav Fowler DO  •  vitamin C (ASCORBIC ACID) tablet 500 mg, 500 mg, Oral, Daily, Dav Fowler DO, 500 mg at 12/10/20 0812  •  zinc sulfate (ZINCATE) capsule 220 mg, 220 mg, Oral, Daily, Dav Fowler DO, 220 mg at 12/10/20 08      Review of Systems   Unable to perform ROS: Acuity of condition       Objective     Vital Signs:  Temp (24hrs), Av °F (36.7 °C), Min:97.6 °F (36.4 °C), Max:98.2 °F (36.8 °C)      /88   Pulse 105   Temp 98.2 °F (36.8 °C)   Resp 28   Ht 170.2 cm (67\")   Wt 107 kg (235 lb 8 oz)   SpO2 90%   BMI 36.88 kg/m²         Physical Exam     General: The patient is sitting up in chair with BiPAP in place.  I did not call her in the room as her O2 sats were so marginal.  HEENT: BiPAP.  Respiratory: Effort fairly even but labored.  O2 sats actually dipped down to 82 to 84% while I was in the MICU.   Neuro: Alert, sitting up in recliner.    Psych: Pleasant and cooperative with staff    Results Review:    I reviewed the patient's new clinical results.    Lab Results:  CBC:   Lab Results   Lab 20  0553 20  0312   WBC 7.29 10.38   HEMOGLOBIN 13.1 12.1   HEMATOCRIT 40.9 37.8   PLATELETS 306 212         CMP:   Lab Results   Lab 20  0620 20  0303 12/10/20  0259   SODIUM 139 139 140   POTASSIUM 4.0 4.4 4.6   CHLORIDE 103 101 102   CO2 25.0 27.0 28.0   BUN 31* 28* 35*   CREATININE 0.50* 0.53* 0.51*   CALCIUM 9.3 9.1 9.4   BILIRUBIN 0.5 0.6 0.5   ALK PHOS 112 118* 96   ALT (SGPT) 12 14 12   AST (SGOT) 21 19 19   GLUCOSE 116* 104* 118*     "         Culture Results:        Microbiology Results Abnormal     Procedure Component Value - Date/Time    Blood Culture - Blood, Arm, Left [[id number]] Collected: 12/02/20 1615    Lab Status: Final result Specimen: Blood from Arm, Left Updated: 12/07/20 1700     Blood Culture No growth at 5 days    Blood Culture - Blood, Arm, Left [[id number]] Collected: 12/02/20 1618    Lab Status: Final result Specimen: Blood from Arm, Left Updated: 12/07/20 1700     Blood Culture No growth at 5 days    MRSA Screen, PCR (Inpatient) - Swab, Nares [[id number]]  (Abnormal) Collected: 12/06/20 2158    Lab Status: Final result Specimen: Swab from Nares Updated: 12/06/20 2335     MRSA PCR MRSA Detected    Urine Culture - Urine, Urine, Clean Catch [[id number]]  (Abnormal)  (Susceptibility) Collected: 12/03/20 1447    Lab Status: Final result Specimen: Urine, Clean Catch Updated: 12/06/20 0934     Urine Culture >100,000 CFU/mL Klebsiella pneumoniae ssp pneumoniae    Susceptibility      Klebsiella pneumoniae ssp pneumoniae     ARACELI     Ampicillin Resistant     Ampicillin + Sulbactam Susceptible     Cefazolin Susceptible     Cefepime Susceptible     Ceftazidime Susceptible     Ceftriaxone Susceptible     Gentamicin Susceptible     Levofloxacin Susceptible     Nitrofurantoin Intermediate     Piperacillin + Tazobactam Susceptible     Tetracycline Susceptible     Trimethoprim + Sulfamethoxazole Susceptible                    Legionella Antigen, Urine - Urine, Urine, Clean Catch [[id number]]  (Normal) Collected: 12/03/20 1447    Lab Status: Final result Specimen: Urine, Clean Catch Updated: 12/03/20 1535     LEGIONELLA ANTIGEN, URINE Negative    S. Pneumo Ag Urine or CSF - Urine, Urine, Clean Catch [[id number]]  (Normal) Collected: 12/03/20 1447    Lab Status: Final result Specimen: Urine, Clean Catch Updated: 12/03/20 1534     Strep Pneumo Ag Negative    Respiratory Panel, PCR (WITHOUT COVID) - Swab, Nasopharynx [[id number]]  (Normal) Collected:  12/03/20 1234    Lab Status: Final result Specimen: Swab from Nasopharynx Updated: 12/03/20 1351     ADENOVIRUS, PCR Not Detected     Coronavirus 229E Not Detected     Coronavirus HKU1 Not Detected     Coronavirus NL63 Not Detected     Coronavirus OC43 Not Detected     Human Metapneumovirus Not Detected     Human Rhinovirus/Enterovirus Not Detected     Influenza B PCR Not Detected     Parainfluenza Virus 1 Not Detected     Parainfluenza Virus 2 Not Detected     Parainfluenza Virus 3 Not Detected     Parainfluenza Virus 4 Not Detected     Bordetella pertussis pcr Not Detected     Influenza A H1 2009 PCR Not Detected     Chlamydophila pneumoniae PCR Not Detected     Mycoplasma pneumo by PCR Not Detected     Influenza A PCR Not Detected     Influenza A H3 Not Detected     Influenza A H1 Not Detected     RSV, PCR Not Detected     Bordetella parapertussis PCR Not Detected    Narrative:      The coronavirus on the RVP is NOT COVID-19 and is NOT indicative of infection with COVID-19.     COVID PRE-OP / PRE-PROCEDURE SCREENING ORDER (NO ISOLATION) - Swab, Nasopharynx [[id number]]  (Abnormal) Collected: 12/02/20 1728    Lab Status: Final result Specimen: Swab from Nasopharynx Updated: 12/02/20 1825    Narrative:      The following orders were created for panel order COVID PRE-OP / PRE-PROCEDURE SCREENING ORDER (NO ISOLATION) - Swab, Nasopharynx.  Procedure                               Abnormality         Status                     ---------                               -----------         ------                     COVID-19, ABBOTT IN-HOUS...[[id number]]  Abnormal            Final result                 Please view results for these tests on the individual orders.    COVID-19, ABBOTT IN-HOUSE,NP Swab (NO TRANSPORT MEDIA) 2 HR TAT - Swab, Nasopharynx [[id number]]  (Abnormal) Collected: 12/02/20 1728    Lab Status: Final result Specimen: Swab from Nasopharynx Updated: 12/02/20 1825     COVID19 Detected    Narrative:      Fact  sheet for providers: https://www.fda.gov/media/229306/download     Fact sheet for patients: https://www.fda.gov/media/252983/download        Results for RASHAD HAM (MRN [id number]) as of 12/10/2020 15:34   Ref. Range 12/2/2020 16:18 12/3/2020 04:48 12/9/2020 03:03   C-Reactive Protein Latest Ref Range: 0.00 - 0.50 mg/dL 11.27 (H) 8.96 (H) 18.57 (H)     Results for RASHAD HAM (MRN [id number]) as of 12/10/2020 15:34   Ref. Range 12/2/2020 16:18 12/3/2020 04:48 12/9/2020 03:03   Ferritin Latest Ref Range: 13.00 - 150.00 ng/mL 1,034.00 (H) 738.90 (H) 689.60 (H)         Radiology:   Imaging Results (Last 72 Hours)     Procedure Component Value Units Date/Time    XR Chest 1 View [[id number]] Collected: 12/10/20 1130     Updated: 12/10/20 1134    Narrative:      EXAMINATION: XR CHEST 1 VW-  12/10/2020 11:30 AM CST 1 view     HISTORY: sob, covid 19, respiratory failure; U07.1-COVID-19     COMPARISON: 12/07/2020.     FINDINGS:   Bilateral airspace consolidations are again noted and similar to the  previous examination with the exception of some mild improved aeration  in the LEFT upper lung. Cardiomegaly is unchanged.      The osseous structures and surrounding soft tissues demonstrate no acute  abnormality.          Impression:         1.  Persistent bilateral airspace consolidations and cardiomegaly.  Slight interval improvement in aeration in the LEFT upper lung.        This report was finalized on 12/10/2020 11:31 by Dr. Riki Hartmann MD.    XR Chest 1 View [[id number]] Collected: 12/07/20 2043     Updated: 12/07/20 2046    Narrative:      EXAMINATION:  XR CHEST 1 VW-  12/7/2020 8:28 PM CST     HISTORY: decreased O2 saturation; SOA; COVID +; U07.1-COVID-19     COMPARISON: 12/2/2020.     FINDINGS:  Bilateral infiltrates are not significantly changed. There is  continued cardiomegaly. No significant pleural effusion is seen.       Impression:      1. No significant change in bilateral infiltrates.  2.  Cardiomegaly.        This report was finalized on 12/07/2020 20:43 by Dr. Isiah Benitez MD.          Assessment/Plan     Active Hospital Problems    Diagnosis   • **Pneumonia due to COVID-19 virus   • Acute respiratory distress syndrome (ARDS) due to COVID-19 virus (CMS/[facility])   • RITA (acute kidney injury) (CMS/[facility])   • COVID-19   • Acute respiratory failure with hypoxia (CMS/[facility])   • Benign essential hypertension   • Common variable immunodeficiency (CMS/[facility])       IMPRESSION:  COVID-19 infection with pneumonia and acute respiratory failure-status post convalescent plasma.  Completed remdesivir.  Acute kidney injury-resolved  Elevated CRP  Elevated ferritin  Elevated D-dimer  Pyuria with few squamous epithelial cells-urine culture with Klebsiella pneumoniae treated with 7 days ceftriaxone  History of common variable immunodeficiency but no treatment in the last 20 years  MRSA nasal screen positive      RECOMMENDATION:   · Continue anticoagulation per hospitalist  · Continue adjuvant therapy per hospitalist  · Up to chair when able  · Continue recommending proning  · If any change in condition, fever, concern for bacterial process, start broad coverage for healthcare associated process including  vancomycin given MRSA nasal screen positive        Tressa Trevino MD  12/10/20  14:13 CST        Electronically signed by Tressa Trevino MD at 12/10/20 1549          Consult Notes (last 24 hours) (Notes from 12/10/20 1014 through 12/11/20 1014)      Misty Holt MD at 12/10/20 1111      Consult Orders    1. Inpatient Pulmonology Consult [[id number]] ordered by Johnnie Zacarias MD at 12/10/20 0934                   PULMONARY AND CRITICAL CARE CONSULT - [facility]    Radha Smiley   MR# [id number]  Acct# [id number]  12/10/2020   11:11 CST    Referring Provider: Johnnie Zacarias*    Chief Complaint: Acute respiratory failure due to SARS-CoV-2 infection    HPI: We are  consulted by Johnnie Zacarias* to see this 55 y.o. female born on 1965.      Patient has presented with continuous worsening and severe shortness of breath in chest for 17 days in context Covid-19, with respiratory deterioration requiring escalation of oxygen therapy.  We have been asked to see her and manage her respiratory status.  She was admitted on December 2.  She has been requiring heated high flow 40 L and FiO2 100 since admission.  Infectious disease has been following along since admission due to her history of common variable immunodeficiency issues.  She is been receiving remdesivir, dexamethasone, and adjunct treatment for COVID-19.  She began imaging for assessment of anoxia.  Her low probability for PE.  She is been on anticoagulation.  Records indicate she is not been requiring IVIG in any years for treatment her her chronic immune issues.  She was placed on Rocephin and azithromycin for treatment of suspected pneumonia as well.  She is also being treated for appendiceal UTI.  She is no longer on any antibiotic therapy.  At some point she was desaturating despite 40 L and FiO2 100 and requiring nonrebreather in addition to this.  This was insufficient it appears sometime around December 6 or 7 began using BiPAP.  ABGs on December 7 on BiPAP 16/8 and FiO2 0.95 showed a PO2 of 60.  Today she is on BiPAP 20/10 FiO2 100 with a saturation of 92% and respiratory rate of 25.  Awaiting ABGs and updated chest x-ray for review.  Last to review are from December 7.  She had echocardiogram which was essentially negative.  Her BNP was not elevated.  She is awake and alert.  Nursing reports she has had little to no nutrition or hydration due to ongoing need for BiPAP.  She saturates better sitting up in the chair.  They have been unable to get her back to the bed.  She is having no other issues other than a dry cough at this point.    Past Medical History   has a past medical history of Cellulitis and  Hypertension.   has a past surgical history that includes Intraocular prosthesis insertion (Left).  Allergies   Allergen Reactions   • Demerol [Meperidine] Rash     Medications  atorvastatin, 10 mg, Oral, Nightly  cholecalciferol, 1,000 Units, Oral, Daily  cloNIDine, 0.1 mg, Oral, BID  dexamethasone, 10 mg, Intravenous, Daily  enoxaparin, 1 mg/kg, Subcutaneous, Q12H  enoxaparin, 60 mg, Subcutaneous, Once  famotidine, 20 mg, Oral, BID  lisinopril, 20 mg, Oral, Q12H  melatonin, 6 mg, Oral, Nightly  metoprolol tartrate, 25 mg, Oral, Q12H  sodium chloride, 10 mL, Intravenous, Q12H  technetium sestamibi, 1 dose, Intravenous, Once in imaging  vitamin C, 500 mg, Oral, Daily  zinc sulfate, 220 mg, Oral, Daily      Pharmacy to Dose enoxaparin (LOVENOX),       Social History   reports that she has quit smoking. She does not have any smokeless tobacco history on file. She reports previous alcohol use.  Family History  family history includes Diabetes in her brother and sister.  Review of Systems:  Review of Systems   Constitutional: Positive for fatigue. Negative for chills and fever.   HENT: Positive for congestion. Negative for rhinorrhea, sinus pressure and sinus pain.    Respiratory: Positive for cough and shortness of breath.    Cardiovascular: Negative for chest pain, palpitations and leg swelling.   Gastrointestinal: Negative for abdominal distention, constipation, diarrhea and nausea.   Endocrine: Negative for polydipsia, polyphagia and polyuria.   Genitourinary: Positive for dysuria, frequency and urgency.   Musculoskeletal: Negative for arthralgias, back pain and gait problem.   Skin: Negative for color change, pallor and rash.   Allergic/Immunologic: Negative for environmental allergies, food allergies and immunocompromised state.   Neurological: Positive for weakness. Negative for dizziness and speech difficulty.   Hematological: Negative for adenopathy. Does not bruise/bleed easily.   Psychiatric/Behavioral:  Negative for agitation and hallucinations. The patient is not nervous/anxious and is not hyperactive.       Physical Exam:  Temp:  [97.2 °F (36.2 °C)-98.1 °F (36.7 °C)] 98 °F (36.7 °C)  Heart Rate:  [] 114  Resp:  [27-31] 27  BP: ()/() 111/84    Intake/Output Summary (Last 24 hours) at 12/10/2020 1111  Last data filed at 12/10/2020 0650  Gross per 24 hour   Intake --   Output 900 ml   Net -900 ml         12/08/20  1335 12/09/20  0316   Weight: 108 kg (239 lb) 107 kg (235 lb 8 oz)     SpO2 Percentage    12/10/20 0600 12/10/20 0630 12/10/20 0700   SpO2: 94% 91% 93%     GENERAL/CONSTITUTIONAL: Mild to moderate distress.  HEENT: atraumatic, normocephalic, BiPAP  NOSE: normal  NECK: jugular veins nondistended  CHEST: Mild paradox, no retractions, respiratory rate 25  CARDIAC: Sinus rhythm, rate 108  ABDOMEN: nondistended  : Deferred  EXTREMITIES: Lower extremity edema.  NEURO:  awake. No seizure activity. No obvious cranial nerve abnormality  SKIN: no jaundice.  No rash      Results from last 7 days   Lab Units 12/07/20  0312 12/04/20  0553   WBC 10*3/mm3 10.38 7.29   HEMOGLOBIN g/dL 12.1 13.1   PLATELETS 10*3/mm3 212 306     Results from last 7 days   Lab Units 12/10/20  0259 12/09/20  0303 12/08/20  1133 12/08/20  0620 12/07/20  0312  12/05/20  0223   SODIUM mmol/L 140 139  --  139 142   < > 142   POTASSIUM mmol/L 4.6 4.4  --  4.0 4.2   < > 3.9   CO2 mmol/L 28.0 27.0  --  25.0 26.0   < > 28.0   BUN mg/dL 35* 28*  --  31* 27*   < > 42*   CREATININE mg/dL 0.51* 0.53*  --  0.50* 0.52*   < > 0.58   MAGNESIUM mg/dL  --   --   --   --  2.1  --  2.4   PHOSPHORUS mg/dL  --   --   --   --  3.2  --  3.1   GLUCOSE mg/dL 118* 104*  --  116* 108*   < > 121*   CRP mg/dL  --  18.57*  --   --   --   --   --    PROCALCITONIN ng/mL  --  0.09  --   --   --   --   --    FERRITIN ng/mL  --  689.60*  --   --   --   --   --    D DIMER QUANT mg/L (FEU)  --   --  19.30*  --   --   --   --     < > = values in this interval  not displayed.     Results from last 7 days   Lab Units 12/07/20  0410 12/06/20  1035 12/06/20  0435   PH, ARTERIAL pH units 7.431 7.452* 7.457*   PCO2, ARTERIAL mm Hg 41.1 38.7 38.5   PO2 ART mm Hg 60.5* 52.4* 60.3*   FIO2 % 95 100 100     Urine Culture   Date Value Ref Range Status   12/03/2020 (A)  Final    >100,000 CFU/mL Klebsiella pneumoniae ssp pneumoniae     Lab Results   Component Value Date    PROBNP 288.6 12/08/2020         Recent radiology:   Imaging Results (Last 72 Hours)     Procedure Component Value Units Date/Time    XR Chest 1 View [[id number]] Collected: 12/07/20 2043     Updated: 12/07/20 2046    Narrative:      EXAMINATION:  XR CHEST 1 VW-  12/7/2020 8:28 PM CST     HISTORY: decreased O2 saturation; SOA; COVID +; U07.1-COVID-19     COMPARISON: 12/2/2020.     FINDINGS:  Bilateral infiltrates are not significantly changed. There is  continued cardiomegaly. No significant pleural effusion is seen.       Impression:      1. No significant change in bilateral infiltrates.  2. Cardiomegaly.        This report was finalized on 12/07/2020 20:43 by Dr. Isiah Benitez MD.        My radiograph interpretation/independent review of imaging: Waiting on updated x-ray for today, chest x-ray from December 7 show cardiomegaly and bilateral interstitial infiltrates consistent with COVID-19    Other test results (not lab or imaging):   Results for orders placed during the hospital encounter of 12/02/20   Adult Transthoracic Echo Complete W/ Cont if Necessary Per Protocol    Narrative · Left ventricular ejection fraction appears to be 66 - 70%. Left   ventricular systolic function is normal.  · Left ventricular diastolic function was normal.  · No evidence of pulmonary hypertension is present.      Normal ejection fraction, no diastolic dysfunction, no pulmonary hypertension    Independent review of ekg: Not applicable    Problem List as identified by Epic (may contain historical, inactive problems)  Patient Active  Problem List   Diagnosis   • COVID-19   • Benign essential hypertension   • Common variable immunodeficiency (CMS/HCC)   • Pneumonia due to COVID-19 virus   • Acute respiratory failure with hypoxia (CMS/HCC)   • RITA (acute kidney injury) (CMS/HCC)   • Acute respiratory distress syndrome (ARDS) due to COVID-19 virus (CMS/HCC)     Pulmonary Assessment:  Acute respiratory failure due to Covid-19.  Pt has failed pulmonary system.  This is a threat to life or pulmonary function, high risk, due to Covid-19 infection and failure of pulmonary system    New problem (to me), with additional workup planned: Acute respiratory failure with hypoxia    New problem (to me), no additional workup planned: Covid-19 with adjuvant therapies per others    Other problems either stable, failing to improve or worsenin. Common variable immunodeficiency  2. UTI, Klebsiella, treated  3. Acute kidney injury    Recommend/plan:   Continue oxygen supplementation for saturation goal of 90%, currently on BiPAP 20/10 FiO2 100 with a saturation 92, increased from 16/8 and FiO2 0.95 previously  Vapotherm 40 L +1 100+ nonrebreather when not on BiPAP, cannot tolerate frequently  Chest X-ray as indicated, with judicious use to minimize exposure risk to hospital personnel, ordered for now  Arterial blood gas analysis as indicated, ordered for now  Aggressive DVT prophylaxis; Lovenox  Proning candidate: Yes, currently unable to do so on current BiPAP settings, if she requires intubation I recommended  Dexamethasone day 2 of 10, on second round initiated by hospitalist    Additional plan:    · Patient unable to receive adequate nutrition and oral hydration she has been unable, with the BiPAP.  Discussed with nursing.  Will review ABG however I suspect she is going to require intubation mechanical ventilation  · Stress ulcer prophylaxis, Pepcid  · As needed bronchodilators  · Prognosis very guarded    Thank you for this consult.  We will follow  along.  Electronically signed by RIKA Garnica on 12/10/2020 at 11:11 CST     ATTESTATION OF CLINICAL NOTE:  I have reviewed the notes, assessments, and/or procedures performed by Kaushik Curry, I concur with her/his documentation of Radha Smiley.  She was seen from outside the glass door risk of cross infection and exposure and to minimize use of PPE.    Patient is a 55 years old  female.  She is a former smoker and probably has underlying COPD but she was not on any home inhalers or nebulizers.  History of comfortably 1 deficiency syndrome.  She presented to the hospital on 2nd of December 2020 with a 10-day history of cough congestion upper respiratory syndrome.  She was diagnosed with COVID-19 about a week before Thanksgiving and due to worsening symptoms she came to the hospital.  She was initially stable but her oxygen requirement gradually increased and she needed Vapotherm or heated high flow oxygen with 40 L flow and 100% FiO2 with 15 L nonrebreather mask.  She received a full course of remdesivir dexamethasone and other treatment for COVID-19.  She had low probability for pulmonary embolism and currently on anticoagulation.  She has not been receiving intravenous immunoglobulin for the common variable immune deficiency syndrome.  She received a course of azithromycin and Rocephin for suspected pneumonia and also treated for urinary tract infection and completed her antibiotic treatment.    She is on BiPAP 20/10 with 100% FiO2 and oxygen saturation 92% and respiratory rate was 25.  Patient had a x-ray done today which shows persistent bilateral airspace consolidation and cardiomegaly with improvement in the aeration in the left upper lung.  Her blood gas drawn this morning showed pH 7.42 PCO2 is 42 PO2 was 53.7 bicarbonate 28.3 oxygen saturation 89%.  This is on BiPAP with 100% oxygen.  She was moved to the intensive care unit with anticipation for possible intubation mechanical  ventilation and pulmonary team was consulted.  Patient was otherwise alert awake and stable and looks comfortable.  She did not have enough hydration or nutrition for the last few days due to BiPAP dependence according to the records.    As per nursing assessment and clinical evaluation patient is a obese  female sitting up in no distress.  She is currently on BiPAP HEENT: Atraumatic normocephalic.  Neck: Supple no mass or jugular venous distention.  Heart: Sounds normal no gallop or murmur.  Lungs: Bilateral crackles and diminished air entry no wheezing.  Abdomen: Soft nondistended nontender.  Extremities: Trace ankle edema normal pulses color.  Neurologic: Grossly intact skin: No major breakdown.                                                                                                                   Continue patient with bronchodilator treatment, routine respiratory care and pulmonary toilet.  She is currently BiPAP dependent and still showing significant hypoxia in the blood gas.  If the respiratory status worsened or the patient gets tired she may need intubation mechanical ventilation.  She already completed remdesivir and currently on dexamethasone vitamin C and zinc.  Continue DVT and stress ulcer prophylaxis and pain anxiety control.  Continue current treatment plan supportive care.  She will need repeat labs and imaging studies and blood gas from time to time.  Physical activity as tolerated.  If patient is unable to eat anything orally we may consider doing a small bore Dobbhoff tube placement and start her tube feeding.  Pulmonary team will continue following her and make further recommendations.  We appreciate the consult from the hospitalist team and will continue to follow.  Total time spent in seeing this patient in pulmonary consult was 45 minutes.. CODE STATUS: Full.    Care plan discussed with RN,RT and APRN. I have seen and examined patient personally, performing a face-to-face  diagnostic evaluation with plan of care reviewed and developed with APRN and nursing staff. I have addended and/or modified the above history of present illness, physical examination, and assessment and plan to reflect my findings and impressions. Essential elements of the care plan were discussed with APRN above.  Agree with findings and assessment/plan as documented above.    Misty Holt MD  Pulmonologist/Intensivist  12/10/2020 14:21 CST     Electronically signed by Misty Holt MD at 12/10/20 0212

## 2020-12-11 NOTE — OP NOTE
Pre-op diagnosis: left tension pneumothorax; obstructive shock, possible distributive shock, acute respiratory failure, COVID 19 infection  Post-op diagnosis:same  Procedure: Emergency left  thoracostomy tube placement  Surgeon: Matthias Aguillon M.D.  Anesthesia: see nursing infusions for mechanical ventilation already in place.    EBL: minimal  Specimen: none  Drains: 28 Fr. Straight pleural chest tube  Findings: Air rush, marked improvement in hemodynamics,  Improvement in oxygenation but sao2 of 88%  Operative description:   she was identified in room 16 in the MICU.  This is an emergency were her life was at substantial risk for death due to tension pneumothorax.  No consent was obtained.  Emergency time out was performed.  With that, the chest was prepped in the usual surgical fashion. A limited skin incision was made.  Blunt dissection was performed over a rib and the chest was accessed without remark.  Air rush is noted.  A 28 Fr trocar chest tube is placed without remark.  This was secured to the skin with silk stitch.  Addition 2-0 silk stitches were placed to closed the access site.  The chest tube was placed in continuity with a Pleur-evac.  The patient tolerated the procedure without remark.  Complications: None immediate.  Disposition: The patient is to remain in the room and continue drainage to 20 cm H2O suction.  Chest x-ray has been reviewed. Air leak noted.  Resolved pneumothorax.  Remains critically ill.

## 2020-12-11 NOTE — ED PROVIDER NOTES
Subjective   I was called to place a chest tube secondary to iatrogenic pneumothroax from CVC placement. The patient has tension physiology and chest tube was emergently placed by myself with immediate improvement of vital signs.       Update: 03:27 CST I was called up to the room again as the lung went down again. I found the CT to be out of the chest. The xray after I did the CT showed the lung to be up and the tube to be fully into the lung thus it must have come out when patient was positioned. Given this I re-advanced the tube to 16 and re-secured it. I took two xrays to confirm the lung was re-inflated. Her pulse ox did also improve to 93%.           Review of Systems    Past Medical History:   Diagnosis Date   • Cellulitis    • Hypertension        Allergies   Allergen Reactions   • Demerol [Meperidine] Rash       Past Surgical History:   Procedure Laterality Date   • INTRAOCULAR PROSTHESES INSERTION Left        Family History   Problem Relation Age of Onset   • Diabetes Sister    • Diabetes Brother        Social History     Socioeconomic History   • Marital status:      Spouse name: Not on file   • Number of children: Not on file   • Years of education: Not on file   • Highest education level: Not on file   Tobacco Use   • Smoking status: Former Smoker   Substance and Sexual Activity   • Alcohol use: Not Currently           Objective   Physical Exam    Chest Tube Insertion    Date/Time: 12/10/2020 11:10 PM  Performed by: Asher Woodard MD  Authorized by: Johnnie Zacarias MD     Consent:     Consent obtained:  Emergent situation  Pre-procedure details:     Skin preparation:  ChloraPrep    Preparation: Patient was prepped and draped in the usual sterile fashion    Anesthesia (see MAR for exact dosages):     Anesthesia method:  Local infiltration    Local anesthetic:  Lidocaine 1% w/o epi  Procedure details:     Placement location:  L lateral    Scalpel size:  15    Tube size (Fr):  28     Dissection instrument:  Rosy clamp and finger    Ultrasound guidance: no      Tension pneumothorax: yes      Tube connected to:  Water seal    Drainage characteristics:  Air only    Suture material:  2-0 silk    Dressing:  4x4 sterile gauze and petrolatum-impregnated gauze  Post-procedure details:     Post-insertion x-ray findings: tube in good position      Patient tolerance of procedure:  Tolerated well, no immediate complications  Comments:      I had to make a rather large incision given large amount of body habitus that prevented me from easily palpating her ribs.                ED Course  ED Course as of Dec 10 2309   Wed Dec 02, 2020   1921 The patient of the results of her test.  Everything is consistent with Covid.  She will require admission for stabilization.  Her pulse ox is better now but still is having work very hard on oxygen to keep it there.  We will admit for further care.    [TR]      ED Course User Index  [TR] Khadar Lyles Jr., MD                                           [facility]    Final diagnoses:   COVID-19            Asher Woodard MD  12/10/20 5980       Asher Woodard MD  12/11/20 1809

## 2020-12-11 NOTE — PROGRESS NOTES
PULMONARY AND CRITICAL CARE PROGRESS NOTE - [facility]    Patient: Radha Smiley    1965    MR# [id number]    Acct# [id number]  12/11/20   10:21 CST  Referring Provider: Johnnie Zacarias*    Chief Complaint: Mechanically ventilated, SARS-CoV-2 infection    Interval history: Pt remains mechanically ventilated due to SARS-CoV-19 infection.  Patient is seen through the glass door in isolation due to COVID-19 and the need to preserve PPE and reduced exposure. Patient is currently not doing well with SBP in the 60s. Her Central line that was placed yesterday that is kinked on CXR imaging. First one port quit working then the next. The nurse was able to get one port to work. Dr. Russo and CRNA were called and right femoral line was placed as well as an art line. This was in process at time of rounding. Patient's pressures dropped yesterday and a central line was placed last night however she had a pneumothorax occur and a chest tube was placed. Temp is 99.7. CXR and ABGs reviewed. She is tachycardic and hypotensive on Fentanyl, Versed, Levophed, Beau-synephrine, Propofol. She is on AC FIO2 100% with sat of 89%.     Meds:  alteplase (CATHFLO) INTRACATHETER injection, 2 mg, Intracatheter, Once  alteplase (CATHFLO) INTRACATHETER injection, 2 mg, Intracatheter, Once  alteplase (CATHFLO) INTRACATHETER injection, 2 mg, Intracatheter, Once  atorvastatin, 10 mg, Oral, Nightly  cholecalciferol, 1,000 Units, Oral, Daily  dexamethasone, 10 mg, Intravenous, Daily  enoxaparin, 1 mg/kg, Subcutaneous, Q12H  famotidine, 20 mg, Oral, BID  melatonin, 6 mg, Oral, Nightly  Morphine, 5 mg, Intravenous, Once  sodium chloride, 10 mL, Intravenous, Q12H  technetium sestamibi, 1 dose, Intravenous, Once in imaging  vitamin C, 500 mg, Oral, Daily  zinc sulfate, 220 mg, Oral, Daily      fentanyl 10 mcg/mL,  mcg/hr, Last Rate: 300 mcg/hr (12/11/20 0000)  midazolam, 1-10 mg/hr, Last Rate: 9 mg/hr (12/11/20  7915)  norepinephrine, 0.02-0.3 mcg/kg/min, Last Rate: 0.3 mcg/kg/min (12/11/20 0517)  Pharmacy to Dose enoxaparin (LOVENOX),   phenylephrine (DIAN-SYNEPHRINE) 50 mg in 250 mL  infusion, 0.5-3 mcg/kg/min, Last Rate: 3 mcg/kg/min (12/11/20 0621)  propofol, 5-50 mcg/kg/min, Last Rate: 50 mcg/kg/min (12/11/20 0554)  rocuronium (ZEMURON) infusion, 8-12 mcg/kg/min, Last Rate: 8 mcg/kg/min (12/11/20 1976)  sodium chloride, 50 mL/hr, Last Rate: 50 mL/hr (12/10/20 1253)  vasopressin, 0.03 Units/min      Review of Systems:   Cannot obtain due to mechanical ventilation.  The patient notably is critically ill and connected to a ventilator.  As such patient cannot communicate and provide any history whatsoever, including any history of present illness or interval history since arrival or review of systems. The interested reviewer may note this fact, as an attempt has been made at collecting and documenting these portions of the patient history, but this information is unobtainable despite attempted review and therefore cannot be documented at this time.   Ventilator Settings:        Resp Rate (Set): 20  Pressure Support (cm H2O): 0 cm H20  FiO2 (%): 100 %  PEEP/CPAP (cm H2O): 10 cm H20  Minute Ventilation (L/min) (Obs): 9.41 L/min  Resp Rate (Observed) Vent: 20  I:E Ratio (Set): 1:2.30  I:E Ratio (Obs): 1:2.3  PIP Observed (cm H2O): 42 cm H2O     Physical Exam:  Temp:  [98 °F (36.7 °C)-100.2 °F (37.9 °C)] (P) 100.2 °F (37.9 °C)  Heart Rate:  [] 114  Resp:  [26-30] 28  BP: ()/() 62/38  Arterial Line BP: (92)/(41) 92/41  FiO2 (%):  [100 %] 100 %    Intake/Output Summary (Last 24 hours) at 12/11/2020 1021  Last data filed at 12/11/2020 0559  Gross per 24 hour   Intake 1507.28 ml   Output 500 ml   Net 1007.28 ml     SpO2 Percentage    12/11/20 0830 12/11/20 0900 12/11/20 1000   SpO2: (!) 88% (!) 89% 90%      GENERAL/CONSTITUTIONAL: In distress.  Pt is on vent  HEENT: atraumatic, normocephalic  NOSE: normal  NECK:  jugular veins nondistended  CHEST: no paradox, no retractions.  No respiratory distress.   Vent synchrony:   CARDIAC: Regular rhythm, tachycardic   ABDOMEN: nondistended  : deferred   EXTREMITIES: lower extremity edema.  NEURO:  sedated, mechanically ventilated  SKIN: no jaundice.  No rash   Results from last 7 days   Lab Units 12/11/20  0508 12/07/20  0312   WBC 10*3/mm3 31.40* 10.38   HEMOGLOBIN g/dL 11.8* 12.1   PLATELETS 10*3/mm3 419 212     Results from last 7 days   Lab Units 12/11/20  0508 12/10/20  0259 12/09/20  0303   SODIUM mmol/L 140 140 139   POTASSIUM mmol/L 5.4* 4.6 4.4   BUN mg/dL 47* 35* 28*   CREATININE mg/dL 1.15* 0.51* 0.53*     Results from last 7 days   Lab Units 12/11/20  0724 12/10/20  1945 12/10/20  1139   PH, ARTERIAL pH units 7.154* 7.340* 7.428   PCO2, ARTERIAL mm Hg 62.3* 48.9* 42.9   PO2 ART mm Hg 89.4 55.4* 53.7*   FIO2 % 100 100 100     No results found for: BLOODCX, URINECX, RESPCX  Recent films:  Xr Chest 1 View    Result Date: 12/11/2020   1.  Interval placement of a new LEFT chest tube with significant improvement in the LEFT lung. The pneumothorax is not definitively seen on this examination and there is improved aeration of the LEFT lung.  2.  Persistent bilateral consolidative changes.   This report was finalized on 12/11/2020 07:28 by Dr. Riki Hartmann MD.    Xr Chest 1 View    Result Date: 12/11/2020   1.  Slight interval increase in size of the pneumothorax on the LEFT, now approximately 30%. It appears that the chest tube at the LEFT lung base has been repositioned.  2.  No change in the appearance of the lung parenchyma.   This report was finalized on 12/11/2020 07:26 by Dr. iRki Hartmann MD.    Xr Chest 1 View    Result Date: 12/11/2020   1.  Unchanged LEFT pneumothorax. No change in the position of the chest tube. The chest tube projects over the LEFT lower chest.  2.  Slight worsening of interstitial and airspace opacities bilaterally.   This report was finalized  on 12/11/2020 07:25 by Dr. Riki Hartmann MD.    Xr Chest 1 View    Result Date: 12/11/2020   1.  Interval decrease in size of the LEFT pneumothorax, now approximately 20%. Slight improvement in aeration of the LEFT lung. No change in appearance of the RIGHT lung.   This report was finalized on 12/11/2020 07:23 by Dr. Riki Hartmann MD.    Xr Chest 1 View    Result Date: 12/11/2020   1.  LEFT pneumothorax has returned accounting for approximately 50% of the LEFT hemithorax..  2.  Interstitial and airspace consolidations in the RIGHT lung are slightly worse than on the comparison exam.   This report was finalized on 12/11/2020 07:20 by Dr. Riki Hartmann MD.    Xr Chest 1 View    Result Date: 12/10/2020  1. Left chest tube placement. There is a small amount of residual pneumothorax on the left. 2. Bilateral lung infiltrates may represent edema or pneumonia. 3. Cardiomegaly.   This report was finalized on 12/10/2020 21:35 by Dr. Isiah Benitez MD.    Xr Chest 1 View    Result Date: 12/10/2020  1. Left subclavian central venous catheter placement. 2. Tension pneumothorax on the left side. The subsequent chest x-ray demonstrated placement of a chest tube. 3. Diffuse infiltrate on the right may represent pulmonary edema or infection/inflammation. 4. Cardiomegaly.   This report was finalized on 12/10/2020 21:34 by Dr. Isiah Benitez MD.    Xr Chest 1 View    Result Date: 12/10/2020  1. Patient intubated with endotracheal tube tip 2.8 cm above the kristine. There may be over distention of the cuff on the endotracheal tube causing outward bowing of the trachea and measuring 2.6 cm. 2. Bilateral infiltrates either unchanged or minimally worse.   This report was finalized on 12/10/2020 18:30 by Dr. Isiah Benitez MD.    Xr Chest 1 View    Result Date: 12/10/2020   1.  Persistent bilateral airspace consolidations and cardiomegaly. Slight interval improvement in aeration in the LEFT upper lung.   This report was finalized on  12/10/2020 11:31 by Dr. Riki Hartmann MD.    Films reviewed personally by me.  My interpretation: Persistent bilateral consolidative changes, ETT in good position, Central line kinked on left, improved aeration of the left lung post chest tube   Pulmonary Assessment:  1. Acute respiratory failure with hypoxia due to Covid-19  2. Common Variable immunodeficiency   3. UTI, Klebsiella, treated   4. Acute Kidney Injury  5. Post Central line pneumothorax, improved post chest tube placement 12/10/20  6. Shock     Recommend:   · Mechanical Ventilation: Intubated 12/10/20 size 7.5, 22 cm at lips, No vent changes this am   · Continue dvt prophylaxis-Lovenox twice daily   · Continue stress ulcer prophylaxis while on vent-Pepcid   · Proning candidate: Not at this time, maybe if prognosis improves   · Dexamethasone 10 mg day 3 of 10  · Competed Remdesivir Dec 7th   · Per attending, Lisinopril discontinued, BB held, Diuretic held, continue pressor support. Per RN maxed on Levophed and Beau. With map of 48 SBP 88  · Prognosis: Very Guarded     Electronically signed by RIKA Pascual on 12/11/2020 at 10:21 CST    ATTESTATION OF CLINICAL NOTE:  I have reviewed the notes, assessments, and/or procedures performed by RIKA Harrison, I concur with her/his documentation of Radha Smiley.  Patient was seen in the intensive care unit today in Covid isolation from outside the glass door to reduce the risk of cross infection and exposure and to minimize use of PPE.    Patient had severe COVID-19 pneumonia and ARDS with progressive respiratory failure and failed BiPAP treatment and Vapotherm and eventually intubated yesterday but condition progressively deteriorated.  She had multiple complications after the intubation.  She developed a pneumothorax after central line placement.  She needed chest tube placement.  This morning her chest tube was in place and the central line was not working and a new central line was  placed.  Patient remained severely tensive on multiple pressors and received full treatment for COVID-19.  Her family was contacted and they wanted to continue care but the patient finally developed a cardiac arrest this afternoon and passed away in the intensive care unit at 4:26 PM.  Patient had asystole and a code was run by Dr. Russo the hospitalist.    Physical examination and assessment was done as per nursing assessment and as documented above.    The patient had severe COVID-19 pneumonia and did not respond to the treatment and eventually passed away from respiratory failure and multiple organ dysfunction with multiple complications.  We appreciate the consult from the hospitalist team.    I have seen and examined patient personally, performing a face-to-face diagnostic evaluation with plan of care reviewed and developed with APRN and nursing staff. I have addended and/or modified the above history of present illness, physical examination, and assessment and plan to reflect my findings and impressions. Essential elements of the care plan were discussed with APRN above.  Agree with findings and assessment/plan as documented above.    Misty Holt MD  Pulmonologist/Intensivist  12/11/2020 20:17 CST

## 2020-12-11 NOTE — PLAN OF CARE
Goal Outcome Evaluation:  Plan of Care Reviewed With: other (see comments)(RN)  Progress: declining  Outcome Summary: Pt sedated on vent, has pressor support. Pt may benefit from trophic enteral ntn and PPN due to pt on presor support to closer meet est. ntn needs if clinically appropriate. Cont to follow.

## 2020-12-11 NOTE — PROGRESS NOTES
Adult Nutrition  Assessment/PES    Patient Name:  Radha Smiley  YOB: 1965  MRN: [id number]  Admit Date:  12/2/2020    Assessment Date:  12/11/2020    Comments:  Pt intubated yesterday. Pt may benefit from AMONS if pt to remain NPO 5 days or greater. Due to pt being on levophed; recommend trophic enteral ntn and peripheral parenteral nutrition to meet est. nutritional needs.     Reason for Assessment     Row Name 12/11/20 0929          Reason for Assessment    Reason For Assessment  follow-up protocol     Diagnosis  pulmonary disease;infection/sepsis         Nutrition/Diet History     Row Name 12/11/20 0956          Nutrition/Diet History    Typical Food/Fluid Intake  Pt intubated yesterday.     Factors Affecting Nutritional Intake  compromised airway         Anthropometrics     Row Name 12/11/20 0981          Anthropometrics    Current Weight Method  -- no new wt        Body Mass Index (BMI)    BMI Assessment  BMI 35-39.9: obesity grade II         Labs/Tests/Procedures/Meds     Row Name 12/11/20 0939          Labs/Procedures/Meds    Lab Results Reviewed  reviewed     Lab Results Comments  Glucose,K+,Cr,BUN,GFR 49        Diagnostic Tests/Procedures    Diagnostic Test/Procedure Reviewed  reviewed     Diagnostic Test/Procedures Comments  intubated 12/10        Medications    Pertinent Medications Reviewed  reviewed, pertinent     Pertinent Medications Comments  Versed,Propofol,Levophed         Physical Findings     Row Name 12/11/20 0989          Physical Findings    Overall Physical Appearance  on ventilator support;obese     Gastrointestinal  other (see comments) last BM 12/6     Tubes  orogastric tube;chest tube     Skin  other (see comments);surgical incision Vin score 13         Estimated/Assessed Needs     Row Name 12/11/20 6759          Calculation Measurements    Weight Used For Calculations  107 kg (235 lb)        Estimated/Assessed Needs    Additional Documentation  Calorie  Requirements (Group);[place] Equation (Group);Protein Requirements (Group);Fluid Requirements (Group)        Calorie Requirements    Estimated Calorie Requirement (kcal/day)  1894     Estimated Calorie Need Method  Pool State     Estimated Calorie Requirement Comment  [id number]        [place] State Equation    Ve (L/Min) for [place] Equation Calculation  9.41     RMR ([place] Equation)  1894.76        Protein Requirements    Weight Used For Protein Calculations  61.2 kg (135 lb) 80-91 gm/day     Est Protein Requirement Amount (gms/kg)  1.5 gm protein     Estimated Protein Requirements (gms/day)  91.85        Fluid Requirements    Fluid Requirements (mL/day)  1894     Estimated Fluid Requirement Method  RDA Method     RDA Method (mL)  1894         Nutrition Prescription Ordered     Row Name 12/11/20 1002          Nutrition Prescription PO    Current PO Diet  NPO        Propofol Considerations    Propofol (mL/hr)  32.1 mL/hr     Propofol (Kcal/day)  847 Kcal/day         Evaluation of Received Nutrient/Fluid Intake     Row Name 12/11/20 1003          Nutrient/Fluid Evaluation    Number of Days Evaluated  1 day     Additional Documentation  Intake Assessment (Group)        Fluid Intake Evaluation    IV Fluid (mL)  1507.3        PO Evaluation    Number of Meals  6     % PO Intake  21         Problem/Interventions:  Problem 1     Row Name 12/11/20 1004          Nutrition Diagnoses Problem 1    Problem 1  Inadequate Intake/Infusion     Inadequate Intake Type  -- NPO/intubated 12/10     Etiology (related to)  Medical Diagnosis     Cardiac  Hypotension     Infectious Disease  Other (comment);Septic shock COVID 19+     Pulmonary/Critical Care  Acute respiratory failure;Pneumonia;Hypoxemia;Ventilator;Chest tube     Renal  RITA     Signs/Symptoms (evidenced by)  NPO           Intervention Goal     Row Name 12/11/20 1010          Intervention Goal    General  Nutrition support treatment;Reduce/improve symptoms;Meet  nutritional needs for age/condition;Disease management/therapy     TF/PN  Inititiate TF/PN intubated; may benefit from AMONS     Weight  No significant weight loss         Nutrition Intervention     Row Name 12/11/20 1010          Nutrition Intervention    RD/Tech Action  Care plan reviewd;Follow Tx progress;Recommend/ordered     Recommended/Ordered  EN         Nutrition Prescription     Row Name 12/11/20 1010          Nutrition Prescription EN    Enteral Prescription  Enteral begin/change;Enteral to supply     Enteral Route  OG     Product  Peptamen AF     TF Delivery Method  Continuous     Continuous TF Goal Rate (mL/hr)  40 mL/hr     Continuous TF Starting Rate (mL/hr)  15 mL/hr while on pressor     Continuous TF Goal Volume (mL)  880 mL     Continuous TF Starting Volume (mL)  330 mL     Water flush (mL)   40 mL     Water Flush Frequency  Per hour     New EN Prescription Ordered?  No, recommended        EN to Supply    Kcal/Day  1056 Kcal/Day     Kcal/Kg  9.87 Kcal/Kg     Kcal/Kg Weight Method  Actual weight     Kcal/day with Propofol   1903 Kcal/day with Propofol     Protein (gm/day)  67 gm/day     Meet Estimated Kcal Need (%)  100 %     Meet Estimated Protein Need (%)  84 % compared to 80 gm/day     TF Free H2O (mL)  713 mL     Total Free H2O (mL/day)  1483 mL/day     Fiber Per Day (gm/day)  5 gm/day        Nutrition Prescription PN    Parenteral Prescription  PN begin/change;Parenteral to supply     PN Route  Central     Dextrose Concentration (%)  5 %     Dextrose (Kcal)  204     Amino Acid Concentration (%)  4.25%     Amino Acid (gm)  51     PN Goal Rate (mL/hr)  50 mL/hr     PN Starting Rate (mL/hr)  25 mL/hr     PN Goal Volume (mL)  1200 mL     PN Starting Volume (mL)  600 mL     Lipid Volume (mL)  -- on propofol     New PN Prescription Ordered?  No, recommended        PN to Supply    NPC/Day  204 NPC/day     Kcal/Day  408 Kcal/day     Kcal/Kg  3.81 Kcal/kg     Kcal/Kg Weight Method  Actual weight      Kcal/Day with Propofol   1255 Kcal/day     Protein (gm/day)  51 gm/day     Meet Estimated Kcal Need (%)  66 %     Meet Estimated Protein Need (%)  64 %        Other Orders    Other  Pt may benefit from trophic enteral ntn and PPN due to pt on presor support to closer meet est. ntn needs.         Education/Evaluation     Row Name 12/11/20 1023          Education    Education  No discharge needs identified at this time        Monitor/Evaluation    Monitor  Per protocol         Electronically signed by:  Yazmin Moise MS,RDN,LD  12/11/20 10:24 CST

## 2020-12-11 NOTE — PROGRESS NOTES
1           [facility] Medicine Services  INPATIENT PROGRESS NOTE    Patient Name: Radha Smiley  Date of Admission: 12/2/2020  Today's Date: 12/11/20  Length of Stay: 9  Primary Care Physician: Adrien De Leon MD    Subjective   Chief Complaint: Follow-up  HPI   Patient was in respiratory distress and showed signs of fatigue yesterday evening.  I came to see her and intubated her.  Patient needed a central line.  This was complicated by pneumothorax requiring 2 chest tubes.  The interested reader is referred to nurses note for details.  CT surgery consulted.  Pulmonary has been consulted as well early yesterday morning  Patient is hypotensive.  She is on 2 pressors  Left subclavian not working.  Alteplase recommended by Dr. Nehemias Samaniego.  Due to the noted kink on left subclavian central line, I pulled about 1 cm out. We were able to flush on one port and draw blood in the other    Review of Systems   Patient unable to participate in her care.  She is critically ill  Objective    Temp:  [98 °F (36.7 °C)-99.7 °F (37.6 °C)] 99.7 °F (37.6 °C)  Heart Rate:  [] 121  Resp:  [26-30] 26  BP: ()/() 110/84  FiO2 (%):  [100 %] 100 %  Physical Exam  Vitals signs and nursing note reviewed.   Constitutional:       Appearance: She is toxic-appearing.      Comments: Intubated, sedated, mechanically ventilated   HENT:      Head: Normocephalic.   Eyes:      General: No scleral icterus.  Cardiovascular:      Rate and Rhythm: Tachycardia present.   Pulmonary:      Comments: Mechanically ventilated  Abdominal:      Comments: Obese, no gross organomegaly   Skin:     General: Skin is warm and dry.      Coloration: Skin is not jaundiced.      Findings: No bruising.   Psychiatric:      Comments: Flat affect       Blood pressure in the 60s    Results Review:  I have reviewed the labs, radiology results, and diagnostic studies.    Laboratory Data:   Results from last 7 days   Lab Units  12/11/20  0508 12/07/20  0312   WBC 10*3/mm3 31.40* 10.38   HEMOGLOBIN g/dL 11.8* 12.1   HEMATOCRIT % 36.9 37.8   PLATELETS 10*3/mm3 419 212        Results from last 7 days   Lab Units 12/11/20  0508 12/10/20  0259 12/09/20  0303   SODIUM mmol/L 140 140 139   POTASSIUM mmol/L 5.4* 4.6 4.4   CHLORIDE mmol/L 104 102 101   CO2 mmol/L 25.0 28.0 27.0   BUN mg/dL 47* 35* 28*   CREATININE mg/dL 1.15* 0.51* 0.53*   CALCIUM mg/dL 9.2 9.4 9.1   BILIRUBIN mg/dL 0.5 0.5 0.6   ALK PHOS U/L 88 96 118*   ALT (SGPT) U/L 12 12 14   AST (SGOT) U/L 21 19 19   GLUCOSE mg/dL 152* 118* 104*       Culture Data:   No results found for: BLOODCX, URINECX, WOUNDCX, MRSACX, RESPCX, STOOLCX    Radiology Data:   Imaging Results (Last 24 Hours)     Procedure Component Value Units Date/Time    XR Chest 1 View [[id number]] Collected: 12/11/20 0726     Updated: 12/11/20 0731    Narrative:      EXAMINATION: XR CHEST 1 VW-  12/11/2020 7:26 AM CST 1 view     HISTORY: pneumo; U07.1-COVID-19; J96.01-Acute respiratory failure with  hypoxia; U07.1-COVID-19; J12.89-Other viral pneumonia; U07.1-COVID-19;  J80-Acute respiratory distress syndrome     COMPARISON: 12/11/2020.     FINDINGS:   A new chest tube is been placed on the LEFT with significant decrease in  size of the pneumothorax. The tip projects over the LEFT lung apex. The  LEFT lung is improved in aeration. There is still bilateral interstitial  and airspace opacities. Other tubes and lines are unchanged.      The osseous structures and surrounding soft tissues demonstrate no acute  abnormality.          Impression:         1.  Interval placement of a new LEFT chest tube with significant  improvement in the LEFT lung. The pneumothorax is not definitively seen  on this examination and there is improved aeration of the LEFT lung.     2.  Persistent bilateral consolidative changes.        This report was finalized on 12/11/2020 07:28 by Dr. Riki Hartmann MD.    XR Chest 1 View [[id number]] Collected:  12/11/20 0725     Updated: 12/11/20 0729    Narrative:      EXAMINATION: XR CHEST 1 VW-  12/11/2020 7:25 AM CST 1 view     HISTORY: decrease in pt status; U07.1-COVID-19; J96.01-Acute respiratory  failure with hypoxia; U07.1-COVID-19; J12.89-Other viral pneumonia;  U07.1-COVID-19; J80-Acute respiratory distress syndrome     COMPARISON: 12/11/2020.     FINDINGS:   Slight interval increase in size of the pneumothorax on the LEFT, now  approximately 30%. No change in the appearance of the lungs.      The LEFT basilar chest tube appears to been repositioned. Other tubes  and lines are unchanged.          Impression:         1.  Slight interval increase in size of the pneumothorax on the LEFT,  now approximately 30%. It appears that the chest tube at the LEFT lung  base has been repositioned.     2.  No change in the appearance of the lung parenchyma.        This report was finalized on 12/11/2020 07:26 by Dr. Riki Hartmann MD.    XR Chest 1 View [[id number]] Collected: 12/11/20 0723     Updated: 12/11/20 0728    Narrative:      EXAMINATION: XR CHEST 1 VW-  12/11/2020 7:23 AM CST 1 view     HISTORY: evaluate chest tube placement; U07.1-COVID-19; J96.01-Acute  respiratory failure with hypoxia; U07.1-COVID-19; J12.89-Other viral  pneumonia; U07.1-COVID-19; J80-Acute respiratory distress syndrome     COMPARISON: 12/11/2020.     FINDINGS:   No change in the size of the pneumothorax on the LEFT. Chest tube  remains in place. Endotracheal tube, enteric tube, and subclavian  central venous catheter are all redemonstrated. There is slight  worsening of interstitial and airspace opacity in both lungs.      The osseous structures and surrounding soft tissues demonstrate no acute  abnormality.          Impression:         1.  Unchanged LEFT pneumothorax. No change in the position of the chest  tube. The chest tube projects over the LEFT lower chest.     2.  Slight worsening of interstitial and airspace opacities bilaterally.          This report was finalized on 12/11/2020 07:25 by Dr. Riki Hartmann MD.    XR Chest 1 View [[id number]] Collected: 12/11/20 0721     Updated: 12/11/20 0726    Narrative:      EXAMINATION: XR CHEST 1 VW-  12/11/2020 7:21 AM CST 1 view     HISTORY: chest tube; U07.1-COVID-19; J96.01-Acute respiratory failure  with hypoxia; U07.1-COVID-19; J12.89-Other viral pneumonia;  U07.1-COVID-19; J80-Acute respiratory distress syndrome     COMPARISON: 12/11/2020 and 12/10/2020.     FINDINGS:   The pneumothorax on the LEFT has decreased in size, now approximately  20%. There is improved aeration of the LEFT lung with persistent  consolidative change. There is slightly improved aeration in the RIGHT  lung with persistent interstitial and airspace opacities. No change in  the cardiomediastinal silhouette. The LEFT subclavian central venous  catheter is kinked. The endotracheal tube is unchanged in position.  Enteric tube courses off the inferior margin of this study.      The osseous structures and surrounding soft tissues demonstrate no acute  abnormality.          Impression:         1.  Interval decrease in size of the LEFT pneumothorax, now  approximately 20%. Slight improvement in aeration of the LEFT lung. No  change in appearance of the RIGHT lung.        This report was finalized on 12/11/2020 07:23 by Dr. Riki Hartmann MD.    XR Chest 1 View [[id number]] Collected: 12/11/20 0714     Updated: 12/11/20 0724    Narrative:      EXAMINATION: XR CHEST 1 VW-  12/11/2020 7:14 AM CST 1 view     HISTORY: COVID; U07.1-COVID-19; J96.01-Acute respiratory failure with  hypoxia; U07.1-COVID-19; J12.89-Other viral pneumonia; U07.1-COVID-19;  J80-Acute respiratory distress syndrome     COMPARISON: 12/10/2020 and 12/10/2020.     FINDINGS:   There has been return of the LEFT pneumothorax which is large,  accounting for approximately 50% of the LEFT chest. This is similar to  the study from yesterday prior to the chest tube placement.    Interstitial and air space consolidations are noted in the RIGHT lung as  well.     Endotracheal tube tip projects approximately 3 cm above the kristine. LEFT  subclavian central venous catheter is kinked but the tip projects over  the mid SVC. There is a chest tube at the LEFT lung base.          Impression:         1.  LEFT pneumothorax has returned accounting for approximately 50% of  the LEFT hemithorax..     2.  Interstitial and airspace consolidations in the RIGHT lung are  slightly worse than on the comparison exam.        This report was finalized on 12/11/2020 07:20 by Dr. Riki Hartmann MD.    XR Chest 1 View [[id number]] Collected: 12/10/20 2134     Updated: 12/10/20 2138    Narrative:      EXAMINATION:  XR CHEST 1 VW-  12/10/2020 9:25 PM CST     HISTORY: Left chest tube placement. Pneumothorax. Covid 19.     COMPARISON: 12/10/2020 at 8:24 PM.     FINDINGS:  There is a new left chest tube in place. There is a small  amount of residual left-sided pneumothorax. There is no longer shifting  of the heart and mediastinum towards the right side. Bilateral lung  infiltrates are not significantly changed. There is cardiomegaly. There  have been no tube or line changes.       Impression:      1. Left chest tube placement. There is a small amount of residual  pneumothorax on the left.  2. Bilateral lung infiltrates may represent edema or pneumonia.  3. Cardiomegaly.        This report was finalized on 12/10/2020 21:35 by Dr. Isiah Benitez MD.    XR Chest 1 View [[id number]] Collected: 12/10/20 2132     Updated: 12/10/20 2137    Narrative:      EXAMINATION:  XR CHEST 1 VW-  12/10/2020 9:25 PM CST     HISTORY: Central line placement. Covid 19.     COMPARISON: 12/10/2020.     FINDINGS:  There is a left subclavian central venous catheter with  catheter tip near the innominate vein and superior vena cava junction.  There is a tension pneumothorax on the left. There is mild shifting of  the heart to the right. There is  mild flattening of the left  hemidiaphragm. The next chest x-ray on the patient demonstrates  placement of a chest tube. There is diffuse lung infiltrate on the  right. There is mild cardiomegaly. Endotracheal and NG tubes remain in  place.       Impression:      1. Left subclavian central venous catheter placement.  2. Tension pneumothorax on the left side. The subsequent chest x-ray  demonstrated placement of a chest tube.  3. Diffuse infiltrate on the right may represent pulmonary edema or  infection/inflammation.  4. Cardiomegaly.        This report was finalized on 12/10/2020 21:34 by Dr. Isiah Benitez MD.    XR Chest 1 View [[id number]] Collected: 12/10/20 1828     Updated: 12/10/20 1833    Narrative:      EXAMINATION:  XR CHEST 1 VW-  12/10/2020 5:56 PM CST     HISTORY: ETT placement; U07.1-COVID-19; J96.01-Acute respiratory failure  with hypoxia     COMPARISON: 12/10/2020.     FINDINGS:  The patient is now intubated. The endotracheal tube tip is  2.8 cm above the kristine. There may be some overdistention of the cuff on  the endotracheal tube as there is slightly outward bowing of the trachea  measuring 2.6 cm. Bilateral infiltrates are either unchanged or  minimally worse. Heart size is upper limits of normal. There is poor  inspiration.       Impression:      1. Patient intubated with endotracheal tube tip 2.8 cm above the kristine.  There may be over distention of the cuff on the endotracheal tube  causing outward bowing of the trachea and measuring 2.6 cm.  2. Bilateral infiltrates either unchanged or minimally worse.        This report was finalized on 12/10/2020 18:30 by Dr. Isiah Benitez MD.    XR Chest 1 View [[id number]] Collected: 12/10/20 1130     Updated: 12/10/20 1134    Narrative:      EXAMINATION: XR CHEST 1 VW-  12/10/2020 11:30 AM CST 1 view     HISTORY: sob, covid 19, respiratory failure; U07.1-COVID-19     COMPARISON: 12/07/2020.     FINDINGS:   Bilateral airspace consolidations are again  noted and similar to the  previous examination with the exception of some mild improved aeration  in the LEFT upper lung. Cardiomegaly is unchanged.      The osseous structures and surrounding soft tissues demonstrate no acute  abnormality.          Impression:         1.  Persistent bilateral airspace consolidations and cardiomegaly.  Slight interval improvement in aeration in the LEFT upper lung.        This report was finalized on 12/10/2020 11:31 by Dr. Riki Hartmann MD.          I have reviewed the patient's current medications.     Assessment/Plan     Active Hospital Problems    Diagnosis   • **Pneumonia due to COVID-19 virus   • Shock, unspecified (CMS/HCC)   • Problem with vascular access   • Leukocytosis   • Postprocedural pneumothorax, left side, s/p chest tube 12/10   • Acute respiratory distress syndrome (ARDS) due to COVID-19 virus (CMS/HCC)   • RITA (acute kidney injury) (CMS/[facility])   • COVID-19   • Acute respiratory failure with hypoxia (CMS/HCC)   • Benign essential hypertension   • Common variable immunodeficiency (CMS/[facility])       Mechanical ventilation per pulmonary  Lovenox increased to treatment dose on December 10  Completed remdesivir on December 7  Been receiving diuretic for ARDS  Received ceftriaxone for urinary tract infection involving Klebsiella pneumoniae  Echocardiogram  Interpretation Summary     · Left ventricular ejection fraction appears to be 66 - 70%. Left ventricular systolic function is normal.  · Left ventricular diastolic function was normal.  · No evidence of pulmonary hypertension is present.          CRP 18.6 (8.9)  Ferritin 689 (738.9)   (628)  Procalcitonin 0.09  D-dimer 19.3 (0.52) -she had a VQ scan on December 3 that was read as low probability for pulmonary embolism.  It appeared that she had acute kidney injury on admit where her creatinine was 1.52.  Her creatinine showed some increased trend    Continue supportive care      alteplase (CATHFLO) INTRACATHETER  injection, 2 mg, Intracatheter, Once  alteplase (CATHFLO) INTRACATHETER injection, 2 mg, Intracatheter, Once  alteplase (CATHFLO) INTRACATHETER injection, 2 mg, Intracatheter, Once  atorvastatin, 10 mg, Oral, Nightly  cholecalciferol, 1,000 Units, Oral, Daily  dexamethasone, 10 mg, Intravenous, Daily  enoxaparin, 1 mg/kg, Subcutaneous, Q12H  famotidine, 20 mg, Oral, BID  melatonin, 6 mg, Oral, Nightly  Morphine, 5 mg, Intravenous, Once  sodium chloride, 10 mL, Intravenous, Q12H  technetium sestamibi, 1 dose, Intravenous, Once in imaging  vitamin C, 500 mg, Oral, Daily  zinc sulfate, 220 mg, Oral, Daily      Noted low blood pressure (hypotension) reading which likely triggered the upward trend in creatinine.  Will DC lisinopril and hold off on beta-blocker, diuretic . Cont pressor support    We will check with pharmacist regarding dose adjustment of Lovenox given renal dysfunction    I requested Dr. Russo for central line as subclavian is not working as expected.     Cont vent management per pulmonary     Chest tube management per CT surgery  Worsening leukocytosis - prob stress reaction; defer to ID if any need for abx  Critical care time > 30 mins  Appreciate consultants and others.  Guarded prognosis  Discharge Planning: ?    Electronically signed by Johnnie Zacarias MD, 12/11/20, 09:43 CST.

## 2020-12-11 NOTE — DISCHARGE SUMMARY
This 55-year-old woman who has been hospitalized since December 2 with worsening COVID-19 viral pneumonia.  She required increasing oxygen requirement despite maximal medical treatment culminating to intubation and mechanical ventilation.  She had hypotension requiring 2 pressors.  She ended up with pneumothorax as a complication of placement of left subclavian central line.  Patient was on maximum treatment available but despite of this she continued to deteriorate.  She coded earlier by Dr. Russo.  Patient passed away at 1626-hour December 11.    Discharge diagnoses includes   ARDS secondary to pneumonia from COVID-19 virus  Acute hypoxic respiratory failure  Shock  Postprocedural left pneumothorax status post chest tube left  Acute kidney injury  Benign essential hypertension  History of common variable immunodeficiency  Obesity with BMI of 36.8  Problem with vascular access

## 2020-12-11 NOTE — PROCEDURES
"Insert Arterial Line    Date/Time: 12/11/2020 10:06 AM  Performed by: Mehul Russo DO  Authorized by: Mehul Russo DO   Consent: The procedure was performed in an emergent situation. Verbal consent not obtained. Written consent not obtained.  Required items: required blood products, implants, devices, and special equipment available  Patient identity confirmed: arm band  Time out: Immediately prior to procedure a \"time out\" was called to verify the correct patient, procedure, equipment, support staff and site/side marked as required.  Preparation: Patient was prepped and draped in the usual sterile fashion.  Indications: multiple ABGs, respiratory failure and hemodynamic monitoring  Location: right femoral    Sedation:  Patient sedated: yes    Needle gauge: 18  Seldinger technique: Seldinger technique used  Number of attempts: 2  Post-procedure: line sutured and dressing applied  Post-procedure CMS: normal and unchanged  Patient tolerance: patient tolerated the procedure well with no immediate complications      I had placed a femoral CVC and decided to go ahead and place a femoral arterial line.  Ultrasound was used. The right femoral artery was only minimally lateral and slightly deep to the right femoral vein.  To get the line to be placed required 2 different skin attempts.  I had to go deep and then pulled back laterally to cannulate the artery.  I ended up using the wire from the venous kit instead of the wire from the arterial kit.      Electronically signed by Mehul Russo DO, 12/11/20, 10:06 AM CST.    "

## 2020-12-11 NOTE — PROCEDURES
"Insert Central Line At Bedside    Date/Time: 12/11/2020 10:02 AM  Performed by: Mehul Russo DO  Authorized by: Mehul Russo DO   Consent: The procedure was performed in an emergent situation. Verbal consent not obtained. Written consent not obtained.  Required items: required blood products, implants, devices, and special equipment available  Patient identity confirmed: arm band  Time out: Immediately prior to procedure a \"time out\" was called to verify the correct patient, procedure, equipment, support staff and site/side marked as required.  Indications: vascular access  Preparation: skin prepped with ChloraPrep  Skin prep agent dried: skin prep agent completely dried prior to procedure  Sterile barriers: all five maximum sterile barriers used - cap, mask, sterile gown, sterile gloves, and large sterile sheet  Hand hygiene: hand hygiene performed prior to central venous catheter insertion  Location details: right femoral  Site selection rationale: Emergent line on an unstable.   Patient position: Trendelenburg  Catheter type: triple lumen  Catheter size: 7 Fr  Pre-procedure: landmarks identified  Ultrasound guidance: yes  Sterile ultrasound techniques: sterile gel and sterile probe covers were used  Number of attempts: 1  Successful placement: yes  Post-procedure: line sutured and dressing applied  Assessment: blood return through all ports and free fluid flow  Patient tolerance: patient tolerated the procedure well with no immediate complications      Dr. Schulte placed a left SC CVC last night and unfortunately the patient sustained a PTX and has chest tube. The SC CVC does not work well per nursing.     The patient remains hypotensive and maxed out on ventilatory support. Dr. Zacarias requested an emergent line.    Assisted by her nurse, Ricardo.    Electronically signed by Mehul Russo DO, 12/11/20, 10:05 AM CST.    "

## 2020-12-14 NOTE — PAYOR COMM NOTE
"REF:[id number]    [facility]  [place],  [phone]  OR  FAX   [phone]         Rashad Smiley (Dcsd. Female)     Date of Birth Social Security Number Address Home Phone MRN    1965  [address] [address] [phone] [id number]    [employer] Marital Status          Other        Admission Date Admission Type Admitting Provider Attending Provider Department, Room/Bed    20 Emergency Johnnie Zacarias MD  [facility] INTENSIVE CARE, I016/    Discharge Date Discharge Disposition Discharge Destination        2020               Attending Provider: (none)   Allergies: Demerol [Meperidine]    Isolation: Enh Drop/Con   Infection: COVID (confirmed) (20), MRSA (20)   Code Status: Prior    Ht: 170.2 cm (67\")   Wt: 107 kg (235 lb 8 oz)    Admission Cmt: None   Principal Problem: Pneumonia due to COVID-19 virus [U07.1,J12.89]                 Active Insurance as of 2020     Primary Coverage     Payor Plan Insurance Group Employer/Plan Group    ANTH BLUE [employer]OSS [employer] CR[employer] BLUE [employer] PPO [id number]     Payor Plan Address Payor Plan Phone Number Payor Plan Fax Number Effective Dates    [address] [phone]  2020 - None Entered    [place] [address]       Subscriber Name Subscriber Birth Date Member ID       RASHAD SMILEY 1965 [id number]                 Emergency Contacts      (Rel.) Home Phone Work Phone Mobile Phone    LETTY HARDY (Spouse) -- -- [phone]    FLOR HER (Daughter) -- -- [phone]               Discharge Summary      Johnnie Zacarias MD at 20 [phone]        This 55-year-old woman who has been hospitalized since  with worsening COVID-19 viral pneumonia.  She required increasing oxygen requirement despite maximal medical treatment culminating to intubation and mechanical ventilation.  She had hypotension requiring 2 pressors.  She ended " up with pneumothorax as a complication of placement of left subclavian central line.  Patient was on maximum treatment available but despite of this she continued to deteriorate.  She coded earlier by Dr. Russo.  Patient passed away at 1626-hour December 11.    Discharge diagnoses includes   ARDS secondary to pneumonia from COVID-19 virus  Acute hypoxic respiratory failure  Shock  Postprocedural left pneumothorax status post chest tube left  Acute kidney injury  Benign essential hypertension  History of common variable immunodeficiency  Obesity with BMI of 36.8  Problem with vascular access      Electronically signed by Johnnie Zacarias MD at 12/11/20 9605

## 2024-08-26 NOTE — NURSING NOTE
Dr. Schulte paged around 1930 for central line placement. He came up and placed a left subclavian line. A pneumothorax was noted on the follow up chest xray. O2 sats and blood pressure dropped after this as well. Dr. Woodard came to bedside and placed a left sided chest tube.  Pts sats and BP improved.     A few hours later, the pts O2 began to drop again. Dr. Schulte notified and a follow up chest xray was obtained that showed the chest tube had been pulled out and the pneumothorax had come back.  Dr. Woodard came back to bedside and reinserted the chest tube to the appropriate place. Sats and BP recovered again. Roughly an hour and a half later, the pts O2 sats began to fall, requiring another chest xray and another pneumothorax was seen.  At this point, Dr. Schulte consulted cardiothoracic surgery and Dr. Aguillon was called and informed of situation.     Dr. Aguillon arrived at bedside and placed a second chest tube below the one placed by Dr. Woodard. Blood pressure immediately improved, and sats went from 70 up to 85 almost immediately. Another chest xray was obtained that showed good placement on the chest tube.    Pts daughter, Eladia was notified multiple times throughout shift and given updates on her mothers care.    Not applicable